# Patient Record
Sex: MALE | Race: BLACK OR AFRICAN AMERICAN | Employment: OTHER | URBAN - METROPOLITAN AREA
[De-identification: names, ages, dates, MRNs, and addresses within clinical notes are randomized per-mention and may not be internally consistent; named-entity substitution may affect disease eponyms.]

---

## 2018-02-27 ENCOUNTER — HOSPITAL ENCOUNTER (OUTPATIENT)
Dept: LAB | Age: 68
Discharge: HOME | End: 2018-02-27
Payer: MEDICARE

## 2018-02-27 ENCOUNTER — OFFICE VISIT (OUTPATIENT)
Dept: FAMILY MEDICINE CLINIC | Age: 68
End: 2018-02-27

## 2018-02-27 VITALS
SYSTOLIC BLOOD PRESSURE: 151 MMHG | TEMPERATURE: 97.3 F | HEART RATE: 74 BPM | DIASTOLIC BLOOD PRESSURE: 90 MMHG | HEIGHT: 72 IN | WEIGHT: 208 LBS | RESPIRATION RATE: 18 BRPM | BODY MASS INDEX: 28.17 KG/M2 | OXYGEN SATURATION: 98 %

## 2018-02-27 DIAGNOSIS — Z11.59 NEED FOR HEPATITIS C SCREENING TEST: ICD-10-CM

## 2018-02-27 DIAGNOSIS — K63.5 POLYP OF COLON, UNSPECIFIED PART OF COLON, UNSPECIFIED TYPE: ICD-10-CM

## 2018-02-27 DIAGNOSIS — R39.9 LOWER URINARY TRACT SYMPTOMS (LUTS): ICD-10-CM

## 2018-02-27 DIAGNOSIS — N40.1 BENIGN PROSTATIC HYPERPLASIA WITH INCOMPLETE BLADDER EMPTYING: ICD-10-CM

## 2018-02-27 DIAGNOSIS — R39.14 BENIGN PROSTATIC HYPERPLASIA WITH INCOMPLETE BLADDER EMPTYING: ICD-10-CM

## 2018-02-27 DIAGNOSIS — Z12.5 SCREENING FOR PROSTATE CANCER: ICD-10-CM

## 2018-02-27 DIAGNOSIS — Z12.11 SCREEN FOR COLON CANCER: ICD-10-CM

## 2018-02-27 DIAGNOSIS — N48.89 PENILE PAIN: ICD-10-CM

## 2018-02-27 DIAGNOSIS — I10 ESSENTIAL HYPERTENSION: ICD-10-CM

## 2018-02-27 DIAGNOSIS — I10 ESSENTIAL HYPERTENSION: Primary | ICD-10-CM

## 2018-02-27 LAB
ALBUMIN SERPL-MCNC: 4.5 G/DL (ref 3.4–5)
ALBUMIN/GLOB SERPL: 1.3 {RATIO} (ref 0.8–1.7)
ALP SERPL-CCNC: 85 U/L (ref 45–117)
ALT SERPL-CCNC: 23 U/L (ref 16–61)
ANION GAP SERPL CALC-SCNC: 9 MMOL/L (ref 3–18)
AST SERPL-CCNC: 20 U/L (ref 15–37)
BASOPHILS # BLD: 0.1 K/UL (ref 0–0.06)
BASOPHILS NFR BLD: 2 % (ref 0–2)
BILIRUB SERPL-MCNC: 0.4 MG/DL (ref 0.2–1)
BUN SERPL-MCNC: 19 MG/DL (ref 7–18)
BUN/CREAT SERPL: 18 (ref 12–20)
CALCIUM SERPL-MCNC: 9 MG/DL (ref 8.5–10.1)
CHLORIDE SERPL-SCNC: 103 MMOL/L (ref 100–108)
CHOLEST SERPL-MCNC: 220 MG/DL
CO2 SERPL-SCNC: 29 MMOL/L (ref 21–32)
CREAT SERPL-MCNC: 1.08 MG/DL (ref 0.6–1.3)
DIFFERENTIAL METHOD BLD: ABNORMAL
EOSINOPHIL # BLD: 0.1 K/UL (ref 0–0.4)
EOSINOPHIL NFR BLD: 1 % (ref 0–5)
ERYTHROCYTE [DISTWIDTH] IN BLOOD BY AUTOMATED COUNT: 12.4 % (ref 11.6–14.5)
GLOBULIN SER CALC-MCNC: 3.6 G/DL (ref 2–4)
GLUCOSE SERPL-MCNC: 105 MG/DL (ref 74–99)
HCT VFR BLD AUTO: 47.1 % (ref 36–48)
HDLC SERPL-MCNC: 72 MG/DL (ref 40–60)
HDLC SERPL: 3.1 {RATIO} (ref 0–5)
HGB BLD-MCNC: 15 G/DL (ref 13–16)
LDLC SERPL CALC-MCNC: 138.2 MG/DL (ref 0–100)
LIPID PROFILE,FLP: ABNORMAL
LYMPHOCYTES # BLD: 1.4 K/UL (ref 0.9–3.6)
LYMPHOCYTES NFR BLD: 32 % (ref 21–52)
MCH RBC QN AUTO: 27.6 PG (ref 24–34)
MCHC RBC AUTO-ENTMCNC: 31.8 G/DL (ref 31–37)
MCV RBC AUTO: 86.6 FL (ref 74–97)
MONOCYTES # BLD: 0.5 K/UL (ref 0.05–1.2)
MONOCYTES NFR BLD: 12 % (ref 3–10)
NEUTS SEG # BLD: 2.4 K/UL (ref 1.8–8)
NEUTS SEG NFR BLD: 53 % (ref 40–73)
PLATELET # BLD AUTO: 223 K/UL (ref 135–420)
PMV BLD AUTO: 10.8 FL (ref 9.2–11.8)
POTASSIUM SERPL-SCNC: 4.3 MMOL/L (ref 3.5–5.5)
PROT SERPL-MCNC: 8.1 G/DL (ref 6.4–8.2)
PSA SERPL-MCNC: 4 NG/ML (ref 0–4)
RBC # BLD AUTO: 5.44 M/UL (ref 4.7–5.5)
SODIUM SERPL-SCNC: 141 MMOL/L (ref 136–145)
TRIGL SERPL-MCNC: 49 MG/DL (ref ?–150)
VLDLC SERPL CALC-MCNC: 9.8 MG/DL
WBC # BLD AUTO: 4.4 K/UL (ref 4.6–13.2)

## 2018-02-27 PROCEDURE — 84153 ASSAY OF PSA TOTAL: CPT | Performed by: FAMILY MEDICINE

## 2018-02-27 PROCEDURE — 82274 ASSAY TEST FOR BLOOD FECAL: CPT | Performed by: FAMILY MEDICINE

## 2018-02-27 PROCEDURE — 80061 LIPID PANEL: CPT | Performed by: FAMILY MEDICINE

## 2018-02-27 PROCEDURE — 80053 COMPREHEN METABOLIC PANEL: CPT | Performed by: FAMILY MEDICINE

## 2018-02-27 PROCEDURE — 85025 COMPLETE CBC W/AUTO DIFF WBC: CPT | Performed by: FAMILY MEDICINE

## 2018-02-27 PROCEDURE — 86803 HEPATITIS C AB TEST: CPT | Performed by: FAMILY MEDICINE

## 2018-02-27 RX ORDER — DICLOFENAC POTASSIUM 50 MG/1
50 TABLET, FILM COATED ORAL 3 TIMES DAILY
COMMUNITY
End: 2018-04-24 | Stop reason: SDUPTHER

## 2018-02-27 NOTE — PATIENT INSTRUCTIONS
Medicare Part B Preventive Services Limitations Recommendation Scheduled   Bone Mass Measurement  (age 72 & older, biennial) Requires diagnosis related to osteoporosis or estrogen deficiency. Biennial benefit unless patient has history of long-term glucocorticoid tx or baseline is needed because initial test was by other method N/a     Cardiovascular Screening Blood Tests (every 5 years)  Total cholesterol, HDL, Triglycerides Order as a panel if possible Due     Colorectal Cancer Screening  -Fecal occult blood test (annual)  -Flexible sigmoidoscopy (5y)  -Screening colonoscopy (10y)  -Barium Enema  Due     Counseling to Prevent Tobacco Use (up to 8 sessions per year)  - Counseling greater than 3 and up to 10 minutes  - Counseling greater than 10 minutes Patients must be asymptomatic of tobacco-related conditions to receive as preventive service Former smoker     Diabetes Screening Tests (at least every 3 years, Medicare covers annually or at 6-month intervals for prediabetic patients)    Fasting blood sugar (FBS) or glucose tolerance test (GTT) Patient must be diagnosed with one of the following:  -Hypertension, Dyslipidemia, obesity, previous impaired FBS or GTT  Or any two of the following: overweight, FH of diabetes, age ? 72, history of gestational diabetes, birth of baby weighing more than 9 pounds Not diabetic     Diabetes Self-Management Training (DSMT) (no USPSTF recommendation) Requires referral by treating physician for patient with diabetes or renal disease. 10 hours of initial DSMT session of no less than 30 minutes each in a continuous 12-month period. 2 hours of follow-up DSMT in subsequent years.  N/a     Glaucoma Screening (no USPSTF recommendation) Diabetes mellitus, family history, , age 48 or over,  American, age 72 or over Due     Human Immunodeficiency Virus (HIV) Screening (annually for increased risk patients)  HIV-1 and HIV-2 by EIA, CASSI, rapid antibody test, or oral mucosa transudate Patient must be at increased risk for HIV infection per USPSTF guidelines or pregnant. Tests covered annually for patients at increased risk. Pregnant patients may receive up to 3 test during pregnancy. N/a     Medical Nutrition Therapy (MNT) (for diabetes or renal disease not recommended schedule) Requires referral by treating physician for patient with diabetes or renal disease. Can be provided in same year as diabetes self-management training (DSMT), and CMS recommends medical nutrition therapy take place after DSMT. Up to 3 hours for initial year and 2 hours in subsequent years. N/a     Prostate Cancer Screening (annually up to age 76)  - Digital rectal exam (DANIKA)  - Prostate specific antigen (PSA) Annually (age 48 or over), DANKIA not paid separately when covered E/M service is provided on same date  Men up to age 76 may need a screening blood test for prostate cancer at certain intervals, depending on their personal and family history. This decision is between the patient and his provider. Enlarged prostate in the past     Seasonal Influenza Vaccination (annually)  UTD      Pneumococcal Vaccination (once after 72)  UTD     Hepatitis B Vaccinations (if medium/high risk) Medium/high risk factors:  End-stage renal disease,  Hemophiliacs who received Factor VIII or IX concentrates, Clients of institutions for the mentally retarded, Persons who live in the same house as a HepB virus carrier, Homosexual men, Illicit injectable drug abusers. N/A     Shingles Vaccination A shingles vaccine is also recommended once in a lifetime after age 61 Patient not interested     Ultrasound Screening for Abdominal Aortic Aneurysm (AAA) (once) Patient must be referred through IPPE and not have had a screening for abdominal aortic aneurysm before under Medicare.   Limited to patients who meet one of the following criteria:  - Men who are 73-68 years old and have smoked more than 100 cigarettes in their lifetime.  -Anyone with a FH of AAA  -Anyone recommended for screening by USPSTF N/A            DASH Diet: Care Instructions  Your Care Instructions    The DASH diet is an eating plan that can help lower your blood pressure. DASH stands for Dietary Approaches to Stop Hypertension. Hypertension is high blood pressure. The DASH diet focuses on eating foods that are high in calcium, potassium, and magnesium. These nutrients can lower blood pressure. The foods that are highest in these nutrients are fruits, vegetables, low-fat dairy products, nuts, seeds, and legumes. But taking calcium, potassium, and magnesium supplements instead of eating foods that are high in those nutrients does not have the same effect. The DASH diet also includes whole grains, fish, and poultry. The DASH diet is one of several lifestyle changes your doctor may recommend to lower your high blood pressure. Your doctor may also want you to decrease the amount of sodium in your diet. Lowering sodium while following the DASH diet can lower blood pressure even further than just the DASH diet alone. Follow-up care is a key part of your treatment and safety. Be sure to make and go to all appointments, and call your doctor if you are having problems. It's also a good idea to know your test results and keep a list of the medicines you take. How can you care for yourself at home? Following the DASH diet  · Eat 4 to 5 servings of fruit each day. A serving is 1 medium-sized piece of fruit, ½ cup chopped or canned fruit, 1/4 cup dried fruit, or 4 ounces (½ cup) of fruit juice. Choose fruit more often than fruit juice. · Eat 4 to 5 servings of vegetables each day. A serving is 1 cup of lettuce or raw leafy vegetables, ½ cup of chopped or cooked vegetables, or 4 ounces (½ cup) of vegetable juice. Choose vegetables more often than vegetable juice. · Get 2 to 3 servings of low-fat and fat-free dairy each day.  A serving is 8 ounces of milk, 1 cup of yogurt, or 1 ½ ounces of cheese. · Eat 6 to 8 servings of grains each day. A serving is 1 slice of bread, 1 ounce of dry cereal, or ½ cup of cooked rice, pasta, or cooked cereal. Try to choose whole-grain products as much as possible. · Limit lean meat, poultry, and fish to 2 servings each day. A serving is 3 ounces, about the size of a deck of cards. · Eat 4 to 5 servings of nuts, seeds, and legumes (cooked dried beans, lentils, and split peas) each week. A serving is 1/3 cup of nuts, 2 tablespoons of seeds, or ½ cup of cooked beans or peas. · Limit fats and oils to 2 to 3 servings each day. A serving is 1 teaspoon of vegetable oil or 2 tablespoons of salad dressing. · Limit sweets and added sugars to 5 servings or less a week. A serving is 1 tablespoon jelly or jam, ½ cup sorbet, or 1 cup of lemonade. · Eat less than 2,300 milligrams (mg) of sodium a day. If you limit your sodium to 1,500 mg a day, you can lower your blood pressure even more. Tips for success  · Start small. Do not try to make dramatic changes to your diet all at once. You might feel that you are missing out on your favorite foods and then be more likely to not follow the plan. Make small changes, and stick with them. Once those changes become habit, add a few more changes. · Try some of the following:  ¨ Make it a goal to eat a fruit or vegetable at every meal and at snacks. This will make it easy to get the recommended amount of fruits and vegetables each day. ¨ Try yogurt topped with fruit and nuts for a snack or healthy dessert. ¨ Add lettuce, tomato, cucumber, and onion to sandwiches. ¨ Combine a ready-made pizza crust with low-fat mozzarella cheese and lots of vegetable toppings. Try using tomatoes, squash, spinach, broccoli, carrots, cauliflower, and onions. ¨ Have a variety of cut-up vegetables with a low-fat dip as an appetizer instead of chips and dip. ¨ Sprinkle sunflower seeds or chopped almonds over salads.  Or try adding chopped walnuts or almonds to cooked vegetables. ¨ Try some vegetarian meals using beans and peas. Add garbanzo or kidney beans to salads. Make burritos and tacos with mashed garcia beans or black beans. Where can you learn more? Go to http://vazquez-shraddha.info/. Enter Y431 in the search box to learn more about \"DASH Diet: Care Instructions. \"  Current as of: September 21, 2016  Content Version: 11.4  © 9797-1448 [employer]. Care instructions adapted under license by [employer] (which disclaims liability or warranty for this information). If you have questions about a medical condition or this instruction, always ask your healthcare professional. [address] any warranty or liability for your use of this information.

## 2018-02-27 NOTE — PROGRESS NOTES
Chief Complaint   Patient presents with   [facility] Establish Care    Penis Pain     warmth feeling x1 month      1. Have you been to the ER, urgent care clinic since your last visit? Hospitalized since your last visit? No    2. Have you seen or consulted any other health care providers outside of the [address] [place] [address] since your last visit? Include any pap smears or colon screening. No    History   Nicci Young comes in to establish care. Hypertension: Patient has history of hypertension. He is on benazepril and amlodipine. Blood pressure slightly elevated. He has not taken his benazepril today. I will check labs. I will follow-up in a month. He is to keep a blood pressure log. Prescription for blood pressure monitor given. Penile discomfort: Patient has burning sensation on the glans penis. This has happened twice in the last 6 weeks. Denies rash or any lesions. He he does have sex with her wife but very rarely. This is due to erectile dysfunction. Has not had sex recently. He is not circumcised. I did evaluate the penile shaft and it is normal.  No signs of any lesions. Likely had some yeast infection given the moist nature around his glans penis. No rash at the moment. He would like to see if this recurs and he will come in at that time. L UTS: Patient has lower urinary tract symptoms with incomplete bladder emptying and post micturition dribbling. He is on Flomax. This does help with the symptoms. He also does have an enlarged prostate. He would like to have his PSA checked. Health maintenance: Patient will have PSA checked, I will do hepatitis C screening and we will given FIT test for colon cancer screening.     Past Medical History:   Diagnosis Date    Arthritis     Hypertension     Other ill-defined conditions(607.34)     awais      Past Surgical History:   Procedure Laterality Date    HX APPENDECTOMY  1961    HX OTHER SURGICAL Left 1990's    Exc. mass foot     HX OTHER SURGICAL Right 1980    release Dequervain's contracture    HX OTHER SURGICAL Left 2014    Removal large nevi behind ear     Current Outpatient Prescriptions   Medication Sig Dispense Refill    diclofenac potassium (CATAFLAM) 50 mg tablet Take 50 mg by mouth three (3) times daily.  amLODIPine (NORVASC) 10 mg tablet Take 10 mg by mouth daily.  benazepril (LOTENSIN) 20 mg tablet Take 20 mg by mouth daily.  tamsulosin (FLOMAX) 0.4 mg capsule Take 0.4 mg by mouth daily.  travoprost (TRAVATAN Z) 0.004 % ophthalmic solution Administer 1 Drop to both eyes nightly.  naproxen sodium (ALEVE) 220 mg cap Take 440 mg by mouth daily.  acetaminophen (TYLENOL EXTRA STRENGTH) 500 mg tablet Take 1,000 mg by mouth every six (6) hours as needed for Pain.  omega-3 fatty acids-vitamin e (FISH OIL) 1,000 mg cap Take 1 Cap by mouth daily. No Known Allergies  No family history on file. Social History   Substance Use Topics    Smoking status: Former Smoker     Quit date: 1/1/1980    Smokeless tobacco: Never Used    Alcohol use No     Patient Active Problem List   Diagnosis Code    History of colon polyps Z86.010    Family history of colon cancer Z80.0       Depression Risk Factor Screening:     PHQ over the last two weeks 2/27/2018   Little interest or pleasure in doing things Not at all   Feeling down, depressed or hopeless Not at all   Total Score PHQ 2 0     Alcohol Risk Factor Screening: You do not drink alcohol or very rarely. Functional Ability and Level of Safety:   1. Was the patient's timed Up and GO test unsteady or longer than 30 seconds? No  2. Does the patient need help with the phone, transportation, shopping, preparing meals, housework, laundry, medications or managing money? No  3. Does the patients' home have rugs in the hallway, lack grab bars in the bathroom, lack handrails on the stairs or have poor lighting? No  4. Have you noticed any hearing difficulties?  No  Hearing Evaluation:  Hearing Loss  Hearing is good. Activities of Daily Living  The home contains: no safety equipment. Patient does total self care    Fall Risk  Fall Risk Assessment, last 12 mths 2/27/2018   Able to walk? Yes   Fall in past 12 months? No       Abuse Screen  Patient is not abused    Cognitive Screening   Evaluation of Cognitive Function:  Has your family/caregiver stated any concerns about your memory: yes  Normal    Review of Systems   A comprehensive review of systems was negative except for that written in the HPI. Physical Examination     Visit Vitals    /90 (BP 1 Location: Left arm, BP Patient Position: Sitting)    Pulse 74    Temp 97.3 °F (36.3 °C) (Oral)    Resp 18    Ht 6' (1.829 m)    Wt 208 lb (94.3 kg)    SpO2 98%    BMI 28.21 kg/m2     General appearance: alert, cooperative, no distress, appears stated age  Head: Normocephalic, without obvious abnormality, atraumatic  Eyes: conjunctivae/corneas clear. PERRL, EOM's intact. Neck: supple, symmetrical, trachea midline, no adenopathy, thyroid: not enlarged, symmetric, no tenderness/mass/nodules, no carotid bruit and no JVD  Lungs: clear to auscultation bilaterally  Heart: regular rate and rhythm, S1, S2 normal, systolic murmur: systolic ejection 2/6,  at lower left sternal border  Male genitalia: penis: no lesions or discharge. Extremities: extremities normal, atraumatic, no cyanosis or edema  Pulses: 2+ and symmetric  Neurologic: Grossly normal    Patient Care Team   Patient Care Team:  Anaya Mart MD as PCP - General (Family Practice)    Assessment/Plan   Education and counseling provided:  Are appropriate based on today's review and evaluation      ICD-10-CM ICD-9-CM    1. Essential hypertension I10 401.9 CBC WITH AUTOMATED DIFF      LIPID PANEL      METABOLIC PANEL, COMPREHENSIVE   2. Lower urinary tract symptoms (LUTS) R39.9 788.99 PSA SCREENING (SCREENING)   3.  Benign prostatic hyperplasia with incomplete bladder emptying N40.1 600.01 PSA SCREENING (SCREENING)    R39.14 788.21    4. Need for hepatitis C screening test Z11.59 V73.89 HEPATITIS C AB   5. Screening for prostate cancer Z12.5 V76.44 PSA SCREENING (SCREENING)   6. Polyp of colon, unspecified part of colon, unspecified type K63.5 211.3    7. Penile pain N48.89 607.9    8. Screen for colon cancer Z12.11 V76.51 OCCULT BLOOD, IMMUNOASSAY (FIT)     Health Maintenance Due   Topic Date Due    Hepatitis C Screening  1950    DTaP/Tdap/Td series (1 - Tdap) 01/15/1970    FOBT Q 1 YEAR AGE 50-75  01/15/1999    ZOSTER VACCINE AGE 60>  11/15/2008    GLAUCOMA SCREENING Q2Y  01/15/2014    Pneumococcal 65+ Low/Medium Risk (1 of 2 - PCV13) 01/15/2014    MEDICARE YEARLY EXAM  01/15/2014    Influenza Age 9 to Adult  08/01/2017   I have discussed the diagnosis with the patient and the intended plan of care as seen in the above orders. The patient has received an after-visit summary and questions were answered concerning future plans. I have discussed medication, side effects, and warnings with the patient in detail. The patient verbalized understanding and is in agreement with the plan of care. The patient will contact the office with any additional concerns.     Brandee Lopez MD

## 2018-02-27 NOTE — MR AVS SNAPSHOT
[facility] [address] [facility] 
[phone] Patient: Higinio Castillor MRN: [id number] OWJ:[id number] Visit Information Date & Time Provider Department Dept. Phone Encounter #  
 2/27/2018  8:30 AM Alma Loya MD [facility] [phone] [id number] Follow-up Instructions Return in about 1 month (around 3/27/2018), or if symptoms worsen or fail to improve, for htn, luts. Upcoming Health Maintenance Date Due Hepatitis C Screening 1950 DTaP/Tdap/Td series (1 - Tdap) 1/15/1970 ZOSTER VACCINE AGE 60> 11/15/2008 GLAUCOMA SCREENING Q2Y 1/15/2014 Pneumococcal 65+ Low/Medium Risk (1 of 2 - PCV13) 1/15/2014 MEDICARE YEARLY EXAM 1/15/2014 COLONOSCOPY 4/18/2019 Allergies as of 2/27/2018  Review Complete On: 2/27/2018 By: Nicola Bhat MD  
 No Known Allergies Current Immunizations  Never Reviewed No immunizations on file. Not reviewed this visit You Were Diagnosed With   
  
 Codes Comments Essential hypertension    -  Primary ICD-10-CM: I10 
ICD-9-CM: 401.9 Lower urinary tract symptoms (LUTS)     ICD-10-CM: R39.9 ICD-9-CM: 788.99 Benign prostatic hyperplasia with incomplete bladder emptying     ICD-10-CM: N40.1, R39.14 ICD-9-CM: 600.01, 788.21 Need for hepatitis C screening test     ICD-10-CM: Z11.59 
ICD-9-CM: V73.89 Screening for prostate cancer     ICD-10-CM: Z12.5 ICD-9-CM: V76.44 Polyp of colon, unspecified part of colon, unspecified type     ICD-10-CM: K63.5 ICD-9-CM: 211.3 Penile pain     ICD-10-CM: N48.89 ICD-9-CM: 607.9 Vitals BP Pulse Temp Resp Height(growth percentile) Weight(growth percentile) 151/90 (BP 1 Location: Left arm, BP Patient Position: Sitting) 74 97.3 °F (36.3 °C) (Oral) 18 6' (1.829 m) 208 lb (94.3 kg) SpO2 BMI Smoking Status 98% 28.21 kg/m2 Former Smoker Vitals History BMI and BSA Data Body Mass Index Body Surface Area  
 28.21 kg/m 2 2.19 m 2 Your Updated Medication List  
  
   
This list is accurate as of 2/27/18  9:26 AM.  Always use your most recent med list.  
  
  
  
  
 ALEVE 220 mg Cap Generic drug:  naproxen sodium Take 440 mg by mouth daily. amLODIPine 10 mg tablet Commonly known as:  Amairani Prow Take 10 mg by mouth daily. benazepril 20 mg tablet Commonly known as:  LOTENSIN Take 20 mg by mouth daily. diclofenac potassium 50 mg tablet Commonly known as:  CATAFLAM  
Take 50 mg by mouth three (3) times daily. FISH OIL 1,000 mg Cap Generic drug:  omega-3 fatty acids-vitamin e Take 1 Cap by mouth daily. FLOMAX 0.4 mg capsule Generic drug:  tamsulosin Take 0.4 mg by mouth daily. TRAVATAN Z 0.004 % ophthalmic solution Generic drug:  travoprost  
Administer 1 Drop to both eyes nightly. TYLENOL EXTRA STRENGTH 500 mg tablet Generic drug:  acetaminophen Take 1,000 mg by mouth every six (6) hours as needed for Pain. Follow-up Instructions Return in about 1 month (around 3/27/2018), or if symptoms worsen or fail to improve, for htn, luts. To-Do List   
 02/27/2018 Lab:  CBC WITH AUTOMATED DIFF   
  
 02/27/2018 Lab:  HEPATITIS C AB   
  
 02/27/2018 Lab:  LIPID PANEL   
  
 02/27/2018 Lab:  METABOLIC PANEL, COMPREHENSIVE   
  
 02/27/2018 Lab:  PSA SCREENING (SCREENING) Patient Instructions Medicare Part B Preventive Services Limitations Recommendation Scheduled Bone Mass Measurement 
(age 72 & older, biennial) Requires diagnosis related to osteoporosis or estrogen deficiency. Biennial benefit unless patient has history of long-term glucocorticoid tx or baseline is needed because initial test was by other method N/a Cardiovascular Screening Blood Tests (every 5 years) Total cholesterol, HDL, Triglycerides Order as a panel if possible Due Colorectal Cancer Screening 
-Fecal occult blood test (annual) -Flexible sigmoidoscopy (5y) 
-Screening colonoscopy (10y) -Barium Enema  Due    
Counseling to Prevent Tobacco Use (up to 8 sessions per year) - Counseling greater than 3 and up to 10 minutes - Counseling greater than 10 minutes Patients must be asymptomatic of tobacco-related conditions to receive as preventive service Former smoker Diabetes Screening Tests (at least every 3 years, Medicare covers annually or at 6-month intervals for prediabetic patients) Fasting blood sugar (FBS) or glucose tolerance test (GTT) Patient must be diagnosed with one of the following: 
-Hypertension, Dyslipidemia, obesity, previous impaired FBS or GTT 
Or any two of the following: overweight, FH of diabetes, age ? 72, history of gestational diabetes, birth of baby weighing more than 9 pounds Not diabetic Diabetes Self-Management Training (DSMT) (no USPSTF recommendation) Requires referral by treating physician for patient with diabetes or renal disease. 10 hours of initial DSMT session of no less than 30 minutes each in a continuous 12-month period. 2 hours of follow-up DSMT in subsequent years. N/a Glaucoma Screening (no USPSTF recommendation) Diabetes mellitus, family history, , age 48 or over,  American, age 72 or over Due Human Immunodeficiency Virus (HIV) Screening (annually for increased risk patients) HIV-1 and HIV-2 by EIA, CASSI, rapid antibody test, or oral mucosa transudate Patient must be at increased risk for HIV infection per USPSTF guidelines or pregnant. Tests covered annually for patients at increased risk. Pregnant patients may receive up to 3 test during pregnancy. N/a Medical Nutrition Therapy (MNT) (for diabetes or renal disease not recommended schedule) Requires referral by treating physician for patient with diabetes or renal disease. Can be provided in same year as diabetes self-management training (DSMT), and CMS recommends medical nutrition therapy take place after DSMT. Up to 3 hours for initial year and 2 hours in subsequent years. N/a Prostate Cancer Screening (annually up to age 76) - Digital rectal exam (DANIKA) - Prostate specific antigen (PSA) Annually (age 48 or over), DANIKA not paid separately when covered E/M service is provided on same date Men up to age 76 may need a screening blood test for prostate cancer at certain intervals, depending on their personal and family history. This decision is between the patient and his provider. Enlarged prostate in the past    
Seasonal Influenza Vaccination (annually)  UTD Pneumococcal Vaccination (once after 65)  UTD Hepatitis B Vaccinations (if medium/high risk) Medium/high risk factors:  End-stage renal disease, Hemophiliacs who received Factor VIII or IX concentrates, Clients of institutions for the mentally retarded, Persons who live in the same house as a HepB virus carrier, Homosexual men, Illicit injectable drug abusers. N/A Shingles Vaccination A shingles vaccine is also recommended once in a lifetime after age 61 Patient not interested Ultrasound Screening for Abdominal Aortic Aneurysm (AAA) (once) Patient must be referred through IPPE and not have had a screening for abdominal aortic aneurysm before under Medicare. Limited to patients who meet one of the following criteria: 
- Men who are 73-68 years old and have smoked more than 100 cigarettes in their lifetime. 
-Anyone with a FH of AAA 
-Anyone recommended for screening by USPSTF N/A    
 
 
  
DASH Diet: Care Instructions Your Care Instructions The DASH diet is an eating plan that can help lower your blood pressure. DASH stands for Dietary Approaches to Stop Hypertension. Hypertension is high blood pressure. The DASH diet focuses on eating foods that are high in calcium, potassium, and magnesium. These nutrients can lower blood pressure. The foods that are highest in these nutrients are fruits, vegetables, low-fat dairy products, nuts, seeds, and legumes. But taking calcium, potassium, and magnesium supplements instead of eating foods that are high in those nutrients does not have the same effect. The DASH diet also includes whole grains, fish, and poultry. The DASH diet is one of several lifestyle changes your doctor may recommend to lower your high blood pressure. Your doctor may also want you to decrease the amount of sodium in your diet. Lowering sodium while following the DASH diet can lower blood pressure even further than just the DASH diet alone. Follow-up care is a key part of your treatment and safety. Be sure to make and go to all appointments, and call your doctor if you are having problems. It's also a good idea to know your test results and keep a list of the medicines you take. How can you care for yourself at home? Following the DASH diet · Eat 4 to 5 servings of fruit each day. A serving is 1 medium-sized piece of fruit, ½ cup chopped or canned fruit, 1/4 cup dried fruit, or 4 ounces (½ cup) of fruit juice. Choose fruit more often than fruit juice. · Eat 4 to 5 servings of vegetables each day. A serving is 1 cup of lettuce or raw leafy vegetables, ½ cup of chopped or cooked vegetables, or 4 ounces (½ cup) of vegetable juice. Choose vegetables more often than vegetable juice. · Get 2 to 3 servings of low-fat and fat-free dairy each day. A serving is 8 ounces of milk, 1 cup of yogurt, or 1 ½ ounces of cheese. · Eat 6 to 8 servings of grains each day. A serving is 1 slice of bread, 1 ounce of dry cereal, or ½ cup of cooked rice, pasta, or cooked cereal. Try to choose whole-grain products as much as possible. · Limit lean meat, poultry, and fish to 2 servings each day.  A serving is 3 ounces, about the size of a deck of cards. · Eat 4 to 5 servings of nuts, seeds, and legumes (cooked dried beans, lentils, and split peas) each week. A serving is 1/3 cup of nuts, 2 tablespoons of seeds, or ½ cup of cooked beans or peas. · Limit fats and oils to 2 to 3 servings each day. A serving is 1 teaspoon of vegetable oil or 2 tablespoons of salad dressing. · Limit sweets and added sugars to 5 servings or less a week. A serving is 1 tablespoon jelly or jam, ½ cup sorbet, or 1 cup of lemonade. · Eat less than 2,300 milligrams (mg) of sodium a day. If you limit your sodium to 1,500 mg a day, you can lower your blood pressure even more. Tips for success · Start small. Do not try to make dramatic changes to your diet all at once. You might feel that you are missing out on your favorite foods and then be more likely to not follow the plan. Make small changes, and stick with them. Once those changes become habit, add a few more changes. · Try some of the following: ¨ Make it a goal to eat a fruit or vegetable at every meal and at snacks. This will make it easy to get the recommended amount of fruits and vegetables each day. ¨ Try yogurt topped with fruit and nuts for a snack or healthy dessert. ¨ Add lettuce, tomato, cucumber, and onion to sandwiches. ¨ Combine a ready-made pizza crust with low-fat mozzarella cheese and lots of vegetable toppings. Try using tomatoes, squash, spinach, broccoli, carrots, cauliflower, and onions. ¨ Have a variety of cut-up vegetables with a low-fat dip as an appetizer instead of chips and dip. ¨ Sprinkle sunflower seeds or chopped almonds over salads. Or try adding chopped walnuts or almonds to cooked vegetables. ¨ Try some vegetarian meals using beans and peas. Add garbanzo or kidney beans to salads. Make burritos and tacos with mashed garcia beans or black beans. Where can you learn more? Go to http://shukla-shraddha.info/. Enter A152 in the search box to learn more about \"DASH Diet: Care Instructions. \" Current as of: September 21, 2016 Content Version: 11.4 © 4099-5107 Healthwise, [employer]. Care instructions adapted under license by [employer] (which disclaims liability or warranty for this information). If you have questions about a medical condition or this instruction, always ask your healthcare professional. [address] any warranty or liability for your use of this information. Introducing [facility]! Kaela Iyer introduces [employer] patient portal. Now you can access parts of your medical record, email your doctor's office, and request medication refills online. 1. In your internet browser, go to [email or web address] 2. Click on the First Time User? Click Here link in the Sign In box. You will see the New Member Sign Up page. 3. Enter your [employer] Access Code exactly as it appears below. You will not need to use this code after youve completed the sign-up process. If you do not sign up before the expiration date, you must request a new code. · [employer] Access Code: [email or web address] Expires: 5/28/2018  9:24 AM 
 
4. Enter the last four digits of your Social Security Number (xxxx) and Date of Birth (mm/dd/yyyy) as indicated and click Submit. You will be taken to the next sign-up page. 5. Create a [employer] ID. This will be your [employer] login ID and cannot be changed, so think of one that is secure and easy to remember. 6. Create a [employer] password. You can change your password at any time. 7. Enter your Password Reset Question and Answer. This can be used at a later time if you forget your password. 8. Enter your e-mail address. You will receive e-mail notification when new information is available in [address] 9. Click Sign Up. You can now view and download portions of your medical record. 10. Click the Download Summary menu link to download a portable copy of your medical information. If you have questions, please visit the Frequently Asked Questions section of the [employer] website. Remember, [employer] is NOT to be used for urgent needs. For medical emergencies, dial 911. Now available from your iPhone and Android! Please provide this summary of care documentation to your next provider. Your primary care clinician is listed as Alma Cramer. If you have any questions after today's visit, please call [phone].

## 2018-02-28 LAB
HCV AB SER IA-ACNC: 0.08 INDEX
HCV AB SERPL QL IA: NEGATIVE
HCV COMMENT,HCGAC: NORMAL

## 2018-03-02 RX ORDER — ATORVASTATIN CALCIUM 40 MG/1
40 TABLET, FILM COATED ORAL DAILY
Qty: 30 TAB | Refills: 2 | Status: SHIPPED | OUTPATIENT
Start: 2018-03-02 | End: 2018-03-05 | Stop reason: SDUPTHER

## 2018-03-02 NOTE — PROGRESS NOTES
If patient is willing to take cholesterol-lowering medication he should let us know and we will send this to his mail order pharmacy.   [place], MD

## 2018-03-02 NOTE — PROGRESS NOTES
Please let patient know his cholesterol level is elevated. I will recommend that he take medication to lower this. I will send in Lipitor to take daily and we will recheck lipid panel in 3 months. We still await his stool for occult blood test.  Rest of his labs are stable.   Ting Guerrero MD

## 2018-03-04 LAB — HEMOCCULT STL QL IA: NEGATIVE

## 2018-03-05 RX ORDER — ATORVASTATIN CALCIUM 40 MG/1
40 TABLET, FILM COATED ORAL DAILY
Qty: 90 TAB | Refills: 1 | Status: SHIPPED | OUTPATIENT
Start: 2018-03-05 | End: 2018-04-24 | Stop reason: SDUPTHER

## 2018-03-05 NOTE — PROGRESS NOTES
Informed patient of lab results at this time. Patient verbalized understanding at this time. Patient states he will take medication and can it be sent in to his mail order at this time.

## 2018-03-27 ENCOUNTER — OFFICE VISIT (OUTPATIENT)
Dept: FAMILY MEDICINE CLINIC | Age: 68
End: 2018-03-27

## 2018-03-27 VITALS
HEIGHT: 72 IN | OXYGEN SATURATION: 99 % | DIASTOLIC BLOOD PRESSURE: 80 MMHG | TEMPERATURE: 97.2 F | WEIGHT: 207 LBS | BODY MASS INDEX: 28.04 KG/M2 | RESPIRATION RATE: 18 BRPM | HEART RATE: 70 BPM | SYSTOLIC BLOOD PRESSURE: 153 MMHG

## 2018-03-27 DIAGNOSIS — E66.3 OVERWEIGHT (BMI 25.0-29.9): ICD-10-CM

## 2018-03-27 DIAGNOSIS — B35.1 ONYCHOMYCOSIS: ICD-10-CM

## 2018-03-27 DIAGNOSIS — E78.5 DYSLIPIDEMIA: ICD-10-CM

## 2018-03-27 DIAGNOSIS — I10 ESSENTIAL HYPERTENSION: Primary | ICD-10-CM

## 2018-03-27 RX ORDER — BENAZEPRIL HYDROCHLORIDE 40 MG/1
40 TABLET ORAL DAILY
Qty: 90 TAB | Refills: 1 | Status: SHIPPED | OUTPATIENT
Start: 2018-03-27 | End: 2018-04-24 | Stop reason: SDUPTHER

## 2018-03-27 RX ORDER — TERBINAFINE HYDROCHLORIDE 250 MG/1
250 TABLET ORAL DAILY
Qty: 90 TAB | Refills: 0 | Status: SHIPPED | OUTPATIENT
Start: 2018-03-27 | End: 2018-04-24

## 2018-03-27 NOTE — MR AVS SNAPSHOT
[facility] [address] [facility] 
[phone] Patient: Ainsley Borrero MRN: [id number] OFM:[id number][birth year]00[id number] Visit Information Date & Time Provider Department Dept. Phone Encounter #  
 3/27/2018  8:30 AM Antoineed Colten Anne MD [facility] [phone] [id number] Follow-up Instructions Return in about 1 month (around 4/27/2018), or if symptoms worsen or fail to improve, for htn, onychomycosis. Upcoming Health Maintenance Date Due DTaP/Tdap/Td series (1 - Tdap) 1/15/1970 ZOSTER VACCINE AGE 60> 11/15/2008 GLAUCOMA SCREENING Q2Y 1/15/2014 Pneumococcal 65+ Low/Medium Risk (1 of 2 - PCV13) 1/15/2014 MEDICARE YEARLY EXAM 3/14/2018 COLONOSCOPY 4/18/2019 Allergies as of 3/27/2018  Review Complete On: 3/27/2018 By: Jennifer Garcia MD  
 No Known Allergies Current Immunizations  Never Reviewed No immunizations on file. Not reviewed this visit You Were Diagnosed With   
  
 Codes Comments Essential hypertension    -  Primary ICD-10-CM: I10 
ICD-9-CM: 401.9 Dyslipidemia     ICD-10-CM: E78.5 ICD-9-CM: 272.4 Onychomycosis     ICD-10-CM: B35.1 ICD-9-CM: 110.1 Vitals BP Pulse Temp Resp Height(growth percentile) Weight(growth percentile) 153/80 (BP 1 Location: Left arm, BP Patient Position: Sitting) 70 97.2 °F (36.2 °C) (Oral) 18 6' (1.829 m) 207 lb (93.9 kg) SpO2 BMI Smoking Status 99% 28.07 kg/m2 Former Smoker Vitals History BMI and BSA Data Body Mass Index Body Surface Area 28.07 kg/m 2 2.18 m 2 Preferred Pharmacy Pharmacy Name Phone 305 [facility], [address] [address] [place] [address] [address] Jessie Rivera [address] Your Updated Medication List  
  
   
This list is accurate as of 3/27/18  9:14 AM.  Always use your most recent med list. amLODIPine 10 mg tablet Commonly known as:  Trudy Orellana Take 10 mg by mouth daily. atorvastatin 40 mg tablet Commonly known as:  LIPITOR Take 1 Tab by mouth daily. benazepril 40 mg tablet Commonly known as:  LOTENSIN Take 1 Tab by mouth daily. diclofenac potassium 50 mg tablet Commonly known as:  CATAFLAM  
Take 50 mg by mouth three (3) times daily. FISH OIL 1,000 mg Cap Generic drug:  omega-3 fatty acids-vitamin e Take 1 Cap by mouth daily. FLOMAX 0.4 mg capsule Generic drug:  tamsulosin Take 0.4 mg by mouth daily. terbinafine HCl 250 mg tablet Commonly known as:  LAMISIL Take 1 Tab by mouth daily. Indications: TOENAIL ONYCHOMYCOSIS  
  
 TRAVATAN Z 0.004 % ophthalmic solution Generic drug:  travoprost  
Administer 1 Drop to both eyes nightly. TYLENOL EXTRA STRENGTH 500 mg tablet Generic drug:  acetaminophen Take 1,000 mg by mouth every six (6) hours as needed for Pain. Prescriptions Sent to Pharmacy Refills  
 benazepril (LOTENSIN) 40 mg tablet 1 Sig: Take 1 Tab by mouth daily. Class: Normal  
 Pharmacy: [address] [place] [address] Ave, [address] Ph #: [phone] Route: Oral  
 terbinafine HCl (LAMISIL) 250 mg tablet 0 Sig: Take 1 Tab by mouth daily. Indications: TOENAIL ONYCHOMYCOSIS Class: Normal  
 Pharmacy: [facility] [address] Ph #: [phone] Route: Oral  
  
Follow-up Instructions Return in about 1 month (around 4/27/2018), or if symptoms worsen or fail to improve, for htn, onychomycosis. Patient Instructions Body Mass Index: Care Instructions Your Care Instructions Body mass index (BMI) can help you see if your weight is raising your risk for health problems. It uses a formula to compare how much you weigh with how tall you are. · A BMI lower than 18.5 is considered underweight. · A BMI between 18.5 and 24.9 is considered healthy. · A BMI between 25 and 29.9 is considered overweight. A BMI of 30 or higher is considered obese. If your BMI is in the normal range, it means that you have a lower risk for weight-related health problems. If your BMI is in the overweight or obese range, you may be at increased risk for weight-related health problems, such as high blood pressure, heart disease, stroke, arthritis or joint pain, and diabetes. If your BMI is in the underweight range, you may be at increased risk for health problems such as fatigue, lower protection (immunity) against illness, muscle loss, bone loss, hair loss, and hormone problems. BMI is just one measure of your risk for weight-related health problems. You may be at higher risk for health problems if you are not active, you eat an unhealthy diet, or you drink too much alcohol or use tobacco products. Follow-up care is a key part of your treatment and safety. Be sure to make and go to all appointments, and call your doctor if you are having problems. It's also a good idea to know your test results and keep a list of the medicines you take. How can you care for yourself at home? · Practice healthy eating habits. This includes eating plenty of fruits, vegetables, whole grains, lean protein, and low-fat dairy. · If your doctor recommends it, get more exercise. Walking is a good choice. Bit by bit, increase the amount you walk every day. Try for at least 30 minutes on most days of the week. · Do not smoke. Smoking can increase your risk for health problems. If you need help quitting, talk to your doctor about stop-smoking programs and medicines. These can increase your chances of quitting for good. · Limit alcohol to 2 drinks a day for men and 1 drink a day for women. Too much alcohol can cause health problems. If you have a BMI higher than 25 · Your doctor may do other tests to check your risk for weight-related health problems.  This may include measuring the distance around your waist. A waist measurement of more than 40 inches in men or 35 inches in women can increase the risk of weight-related health problems. · Talk with your doctor about steps you can take to stay healthy or improve your health. You may need to make lifestyle changes to lose weight and stay healthy, such as changing your diet and getting regular exercise. If you have a BMI lower than 18.5 · Your doctor may do other tests to check your risk for health problems. · Talk with your doctor about steps you can take to stay healthy or improve your health. You may need to make lifestyle changes to gain or maintain weight and stay healthy, such as getting more healthy foods in your diet and doing exercises to build muscle. Where can you learn more? Go to http://vazquez-shraddha.info/. Enter S176 in the search box to learn more about \"Body Mass Index: Care Instructions. \" Current as of: October 13, 2016 Content Version: 11.4 © 5703-7809 [employer]. Care instructions adapted under license by [employer] (which disclaims liability or warranty for this information). If you have questions about a medical condition or this instruction, always ask your healthcare professional. [place] [address] any warranty or liability for your use of this information. Toenail Fungus: Care Instructions Your Care Instructions A toenail that is infected by a fungus usually turns white or yellow. As the fungus spreads, the nail turns a darker color and gets thicker, and its edges start to turn ragged and crumble. A bad infection can cause toe pain, and the nail may pull away from the toe. Toenails that are exposed to moisture and warmth a lot are more likely to get infected by a fungus. This can happen from wearing sweaty shoes often and from walking barefoot on shower floors.  
It is hard to treat toenail fungus, and the infection can return after it has cleared up. But medicines can sometimes get rid of toenail fungus for good. If the infection is very bad, or if it causes a lot of pain, you may need to have the nail removed. Follow-up care is a key part of your treatment and safety. Be sure to make and go to all appointments, and call your doctor if you are having problems. It's also a good idea to know your test results and keep a list of the medicines you take. How can you care for yourself at home? · Take your medicines exactly as prescribed. Call your doctor if you have any problems with your medicine. You will get more details on the specific medicines your doctor prescribes. · If your doctor gave you a cream or liquid to put on your toenail, use it exactly as directed. · Wash your feet often, and wash your hands after touching your feet. · Keep your toenails clean and dry. Dry your feet completely after you bathe and before you put on shoes and socks. · Keep your toenails trimmed. · Change socks often. Wear dry socks that absorb moisture. · Do not go barefoot in public places. · Use a spray or powder that fights fungus on your feet and in your shoes. · Do not pick at the skin around your nails. · Do not use nail polish or fake nails on your toenails. When should you call for help? Call your doctor now or seek immediate medical care if: 
? · You have signs of infection, such as: 
¨ Increased pain, swelling, warmth, or redness. ¨ Red streaks leading from the site. ¨ Pus draining from the site. ¨ A fever. ? · You have new or increased toe pain. ? Watch closely for changes in your health, and be sure to contact your doctor if: 
? · You do not get better as expected. Where can you learn more? Go to http://vazquez-shraddha.info/. Enter D202 in the search box to learn more about \"Toenail Fungus: Care Instructions. \" Current as of: October 13, 2016 Content Version: 11.4 © 1699-1705 [employer]. Care instructions adapted under license by [employer] (which disclaims liability or warranty for this information). If you have questions about a medical condition or this instruction, always ask your healthcare professional. [address] any warranty or liability for your use of this information. Introducing [facility]! [employer] introduces Rome2rio patient portal. Now you can access parts of your medical record, email your doctor's office, and request medication refills online. 1. In your internet browser, go to [email or web address] 2. Click on the First Time User? Click Here link in the Sign In box. You will see the New Member Sign Up page. 3. Enter your Rome2rio Access Code exactly as it appears below. You will not need to use this code after youve completed the sign-up process. If you do not sign up before the expiration date, you must request a new code. · Rome2rio Access Code: [email or web address] Expires: 5/28/2018 10:24 AM 
 
4. Enter the last four digits of your Social Security Number (xxxx) and Date of Birth (mm/dd/yyyy) as indicated and click Submit. You will be taken to the next sign-up page. 5. Create a Rome2rio ID. This will be your Rome2rio login ID and cannot be changed, so think of one that is secure and easy to remember. 6. Create a Rome2rio password. You can change your password at any time. 7. Enter your Password Reset Question and Answer. This can be used at a later time if you forget your password. 8. Enter your e-mail address. You will receive e-mail notification when new information is available in [address] 9. Click Sign Up. You can now view and download portions of your medical record. 10. Click the Download Summary menu link to download a portable copy of your medical information.  
 
If you have questions, please visit the Frequently Asked Questions section of the [facility]. Remember, PagerDutyhart is NOT to be used for urgent needs. For medical emergencies, dial 911. Now available from your iPhone and Android! Please provide this summary of care documentation to your next provider. Your primary care clinician is listed as Alma Cramer. If you have any questions after today's visit, please call [phone].

## 2018-03-27 NOTE — PROGRESS NOTES
Chief Complaint   Patient presents with    Follow-up     HTN,LUTS      1. Have you been to the ER, urgent care clinic since your last visit? Hospitalized since your last visit? No    2. Have you seen or consulted any other health care providers outside of the [facility] since your last visit? Include any pap smears or colon screening. No     HPI  Ainsley Borrero comes in for follow-up care. Hypertension: Patient has hypertension. Pressure remains elevated. We discussed medication adjustments and management for his blood pressure. For now we will increase the benazepril to 40 mg daily. He will continue with amlodipine 10 mg daily. Follow-up in a month. Toenail onychomycosis: Patient has bilateral toenail onychomycosis. Would like to try medication for this. I will send in Lamisil to take to 50 mg once a day. Follow-up in 1 month to see how he is doing. We will check hepatic panel then. Arthritis: Patient has generalized arthralgia and takes diclofenac for this. Occasionally has taken Aleve and this also did help. We discussed medication management. He should avoid taking both NSAIDs of the same time. I will have him use the diclofenac for now. Past Medical History  Past Medical History:   Diagnosis Date    Arthritis     Hypertension     Other ill-defined conditions(294.53)     murmer       Surgical History  Past Surgical History:   Procedure Laterality Date    HX APPENDECTOMY  1961    HX OTHER SURGICAL Left 1990's    Exc. mass foot     HX OTHER SURGICAL Right 1980    release Dequervain's contracture    HX OTHER SURGICAL Left 2014    Removal large nevi behind ear        Medications  Current Outpatient Prescriptions   Medication Sig Dispense Refill    atorvastatin (LIPITOR) 40 mg tablet Take 1 Tab by mouth daily. 90 Tab 1    diclofenac potassium (CATAFLAM) 50 mg tablet Take 50 mg by mouth three (3) times daily.       amLODIPine (NORVASC) 10 mg tablet Take 10 mg by mouth daily.      benazepril (LOTENSIN) 20 mg tablet Take 20 mg by mouth daily.  tamsulosin (FLOMAX) 0.4 mg capsule Take 0.4 mg by mouth daily.  travoprost (TRAVATAN Z) 0.004 % ophthalmic solution Administer 1 Drop to both eyes nightly.  omega-3 fatty acids-vitamin e (FISH OIL) 1,000 mg cap Take 1 Cap by mouth daily.  naproxen sodium (ALEVE) 220 mg cap Take 440 mg by mouth daily.  acetaminophen (TYLENOL EXTRA STRENGTH) 500 mg tablet Take 1,000 mg by mouth every six (6) hours as needed for Pain. Allergies  No Known Allergies    Family History  No family history on file. Social History  Social History     Social History    Marital status:      Spouse name: N/A    Number of children: N/A    Years of education: N/A     Occupational History    Not on file.      Social History Main Topics    Smoking status: Former Smoker     Quit date: 1/1/1980    Smokeless tobacco: Never Used    Alcohol use No    Drug use: No      Comment: H/O in the past Marijuana    Sexual activity: Not on file     Other Topics Concern    Not on file     Social History Narrative       Review of Systems  Review of Systems - History obtained from chart review and the patient  General ROS: negative  Psychological ROS: negative  Ophthalmic ROS: positive for - uses glasses  ENT ROS: negative  Allergy and Immunology ROS: negative  Hematological and Lymphatic ROS: negative  Endocrine ROS: negative  Respiratory ROS: no cough, shortness of breath, or wheezing  Cardiovascular ROS: no chest pain or dyspnea on exertion  Gastrointestinal ROS: no abdominal pain, change in bowel habits, or black or bloody stools  Genito-Urinary ROS: no dysuria, trouble voiding, or hematuria  Musculoskeletal ROS: negative  Neurological ROS: no TIA or stroke symptoms  Dermatological ROS: positive for - nail changes    Vital Signs  Visit Vitals    /80 (BP 1 Location: Left arm, BP Patient Position: Sitting)    Pulse 70    Temp 97.2 °F (36.2 °C) (Oral)    Resp 18    Ht 6' (1.829 m)    Wt 207 lb (93.9 kg)    SpO2 99%    BMI 28.07 kg/m2         Physical Exam  Physical Examination: General appearance - alert, well appearing, and in no distress, oriented to person, place, and time, acyanotic, in no respiratory distress and well hydrated  Mental status - alert, oriented to person, place, and time, affect appropriate to mood  Mouth - mucous membranes moist, pharynx normal without lesions  Neck - supple, no significant adenopathy  Lymphatics - no palpable lymphadenopathy  Chest - clear to auscultation, no wheezes, rales or rhonchi, symmetric air entry  Heart - normal rate and regular rhythm, S1 and S2 normal  Neurological - alert, oriented, normal speech, no focal findings or movement disorder noted  Musculoskeletal - osteoarthritic changes noted in both hands  Extremities - intact peripheral pulses  Skin - NAILS: onychomycosis of the toenails    Results  Results for orders placed or performed during the hospital encounter of 02/27/18   HEPATITIS C AB   Result Value Ref Range    Hepatitis C virus Ab 0.08 <0.80 Index    Hep C  virus Ab Interp. NEGATIVE  NEG      Hep C  virus Ab comment         CBC WITH AUTOMATED DIFF   Result Value Ref Range    WBC 4.4 (L) 4.6 - 13.2 K/uL    RBC 5.44 4.70 - 5.50 M/uL    HGB 15.0 13.0 - 16.0 g/dL    HCT 47.1 36.0 - 48.0 %    MCV 86.6 74.0 - 97.0 FL    MCH 27.6 24.0 - 34.0 PG    MCHC 31.8 31.0 - 37.0 g/dL    RDW 12.4 11.6 - 14.5 %    PLATELET 387 069 - 575 K/uL    MPV 10.8 9.2 - 11.8 FL    NEUTROPHILS 53 40 - 73 %    LYMPHOCYTES 32 21 - 52 %    MONOCYTES 12 (H) 3 - 10 %    EOSINOPHILS 1 0 - 5 %    BASOPHILS 2 0 - 2 %    ABS. NEUTROPHILS 2.4 1.8 - 8.0 K/UL    ABS. LYMPHOCYTES 1.4 0.9 - 3.6 K/UL    ABS. MONOCYTES 0.5 0.05 - 1.2 K/UL    ABS. EOSINOPHILS 0.1 0.0 - 0.4 K/UL    ABS.  BASOPHILS 0.1 (H) 0.0 - 0.06 K/UL    DF AUTOMATED     LIPID PANEL   Result Value Ref Range    LIPID PROFILE          Cholesterol, total 220 (H) <200 MG/DL    Triglyceride 49 <150 MG/DL    HDL Cholesterol 72 (H) 40 - 60 MG/DL    LDL, calculated 138.2 (H) 0 - 100 MG/DL    VLDL, calculated 9.8 MG/DL    CHOL/HDL Ratio 3.1 0 - 5.0     METABOLIC PANEL, COMPREHENSIVE   Result Value Ref Range    Sodium 141 136 - 145 mmol/L    Potassium 4.3 3.5 - 5.5 mmol/L    Chloride 103 100 - 108 mmol/L    CO2 29 21 - 32 mmol/L    Anion gap 9 3.0 - 18 mmol/L    Glucose 105 (H) 74 - 99 mg/dL    BUN 19 (H) 7.0 - 18 MG/DL    Creatinine 1.08 0.6 - 1.3 MG/DL    BUN/Creatinine ratio 18 12 - 20      GFR est AA >60 >60 ml/min/1.73m2    GFR est non-AA >60 >60 ml/min/1.73m2    Calcium 9.0 8.5 - 10.1 MG/DL    Bilirubin, total 0.4 0.2 - 1.0 MG/DL    ALT (SGPT) 23 16 - 61 U/L    AST (SGOT) 20 15 - 37 U/L    Alk. phosphatase 85 45 - 117 U/L    Protein, total 8.1 6.4 - 8.2 g/dL    Albumin 4.5 3.4 - 5.0 g/dL    Globulin 3.6 2.0 - 4.0 g/dL    A-G Ratio 1.3 0.8 - 1.7     PSA SCREENING (SCREENING)   Result Value Ref Range    Prostate Specific Ag 4.0 0.0 - 4.0 ng/mL       ASSESSMENT and PLAN    ICD-10-CM ICD-9-CM    1. Essential hypertension I10 401.9 benazepril (LOTENSIN) 40 mg tablet   2. Dyslipidemia E78.5 272.4    3. Onychomycosis B35.1 110.1 terbinafine HCl (LAMISIL) 250 mg tablet   4. Overweight (BMI 25.0-29. 9) E66.3 278.02      lab results and schedule of future lab studies reviewed with patient  reviewed diet, exercise and weight control  reviewed medications and side effects in detail  Discussed the patient's BMI with him. The BMI follow up plan is as follows:   dietary management education, guidance, and counseling  encourage exercise  monitor weight    I have discussed the diagnosis with the patient and the intended plan of care as seen in the above orders. The patient has received an after-visit summary and questions were answered concerning future plans. I have discussed medication, side effects, and warnings with the patient in detail.  The patient verbalized understanding and is in agreement with the plan of care. The patient will contact the office with any additional concerns.     Nestor Mc MD

## 2018-03-27 NOTE — PATIENT INSTRUCTIONS
Body Mass Index: Care Instructions  Your Care Instructions    Body mass index (BMI) can help you see if your weight is raising your risk for health problems. It uses a formula to compare how much you weigh with how tall you are. · A BMI lower than 18.5 is considered underweight. · A BMI between 18.5 and 24.9 is considered healthy. · A BMI between 25 and 29.9 is considered overweight. A BMI of 30 or higher is considered obese. If your BMI is in the normal range, it means that you have a lower risk for weight-related health problems. If your BMI is in the overweight or obese range, you may be at increased risk for weight-related health problems, such as high blood pressure, heart disease, stroke, arthritis or joint pain, and diabetes. If your BMI is in the underweight range, you may be at increased risk for health problems such as fatigue, lower protection (immunity) against illness, muscle loss, bone loss, hair loss, and hormone problems. BMI is just one measure of your risk for weight-related health problems. You may be at higher risk for health problems if you are not active, you eat an unhealthy diet, or you drink too much alcohol or use tobacco products. Follow-up care is a key part of your treatment and safety. Be sure to make and go to all appointments, and call your doctor if you are having problems. It's also a good idea to know your test results and keep a list of the medicines you take. How can you care for yourself at home? · Practice healthy eating habits. This includes eating plenty of fruits, vegetables, whole grains, lean protein, and low-fat dairy. · If your doctor recommends it, get more exercise. Walking is a good choice. Bit by bit, increase the amount you walk every day. Try for at least 30 minutes on most days of the week. · Do not smoke. Smoking can increase your risk for health problems. If you need help quitting, talk to your doctor about stop-smoking programs and medicines. These can increase your chances of quitting for good. · Limit alcohol to 2 drinks a day for men and 1 drink a day for women. Too much alcohol can cause health problems. If you have a BMI higher than 25  · Your doctor may do other tests to check your risk for weight-related health problems. This may include measuring the distance around your waist. A waist measurement of more than 40 inches in men or 35 inches in women can increase the risk of weight-related health problems. · Talk with your doctor about steps you can take to stay healthy or improve your health. You may need to make lifestyle changes to lose weight and stay healthy, such as changing your diet and getting regular exercise. If you have a BMI lower than 18.5  · Your doctor may do other tests to check your risk for health problems. · Talk with your doctor about steps you can take to stay healthy or improve your health. You may need to make lifestyle changes to gain or maintain weight and stay healthy, such as getting more healthy foods in your diet and doing exercises to build muscle. Where can you learn more? Go to http://vazquez-shraddha.info/. Enter S176 in the search box to learn more about \"Body Mass Index: Care Instructions. \"  Current as of: October 13, 2016  Content Version: 11.4  © 6098-2740 [employer]. Care instructions adapted under license by [employer] (which disclaims liability or warranty for this information). If you have questions about a medical condition or this instruction, always ask your healthcare professional. [place] [address] any warranty or liability for your use of this information. Toenail Fungus: Care Instructions  Your Care Instructions  A toenail that is infected by a fungus usually turns white or yellow. As the fungus spreads, the nail turns a darker color and gets thicker, and its edges start to turn ragged and crumble.  A bad infection can cause toe pain, and the nail may pull away from the toe. Toenails that are exposed to moisture and warmth a lot are more likely to get infected by a fungus. This can happen from wearing sweaty shoes often and from walking barefoot on shower floors. It is hard to treat toenail fungus, and the infection can return after it has cleared up. But medicines can sometimes get rid of toenail fungus for good. If the infection is very bad, or if it causes a lot of pain, you may need to have the nail removed. Follow-up care is a key part of your treatment and safety. Be sure to make and go to all appointments, and call your doctor if you are having problems. It's also a good idea to know your test results and keep a list of the medicines you take. How can you care for yourself at home? · Take your medicines exactly as prescribed. Call your doctor if you have any problems with your medicine. You will get more details on the specific medicines your doctor prescribes. · If your doctor gave you a cream or liquid to put on your toenail, use it exactly as directed. · Wash your feet often, and wash your hands after touching your feet. · Keep your toenails clean and dry. Dry your feet completely after you bathe and before you put on shoes and socks. · Keep your toenails trimmed. · Change socks often. Wear dry socks that absorb moisture. · Do not go barefoot in public places. · Use a spray or powder that fights fungus on your feet and in your shoes. · Do not pick at the skin around your nails. · Do not use nail polish or fake nails on your toenails. When should you call for help? Call your doctor now or seek immediate medical care if:  ? · You have signs of infection, such as:  ¨ Increased pain, swelling, warmth, or redness. ¨ Red streaks leading from the site. ¨ Pus draining from the site. ¨ A fever. ? · You have new or increased toe pain. ? Watch closely for changes in your health, and be sure to contact your doctor if:  ? · You do not get better as expected. Where can you learn more? Go to http://vazquez-shraddha.info/. Enter D202 in the search box to learn more about \"Toenail Fungus: Care Instructions. \"  Current as of: October 13, 2016  Content Version: 11.4  © 6116-9643 [employer]. Care instructions adapted under license by [employer] (which disclaims liability or warranty for this information). If you have questions about a medical condition or this instruction, always ask your healthcare professional. [address] any warranty or liability for your use of this information.

## 2018-04-24 ENCOUNTER — OFFICE VISIT (OUTPATIENT)
Dept: FAMILY MEDICINE CLINIC | Age: 68
End: 2018-04-24

## 2018-04-24 VITALS
SYSTOLIC BLOOD PRESSURE: 139 MMHG | BODY MASS INDEX: 28.04 KG/M2 | OXYGEN SATURATION: 97 % | RESPIRATION RATE: 18 BRPM | DIASTOLIC BLOOD PRESSURE: 81 MMHG | WEIGHT: 207 LBS | TEMPERATURE: 98.1 F | HEART RATE: 68 BPM | HEIGHT: 72 IN

## 2018-04-24 DIAGNOSIS — T78.40XD ALLERGIC STATE, SUBSEQUENT ENCOUNTER: ICD-10-CM

## 2018-04-24 DIAGNOSIS — E78.5 DYSLIPIDEMIA: ICD-10-CM

## 2018-04-24 DIAGNOSIS — B35.1 ONYCHOMYCOSIS: ICD-10-CM

## 2018-04-24 DIAGNOSIS — R39.9 LOWER URINARY TRACT SYMPTOMS (LUTS): ICD-10-CM

## 2018-04-24 DIAGNOSIS — M25.50 ARTHRALGIA, UNSPECIFIED JOINT: ICD-10-CM

## 2018-04-24 DIAGNOSIS — I10 ESSENTIAL HYPERTENSION: Primary | ICD-10-CM

## 2018-04-24 RX ORDER — TAMSULOSIN HYDROCHLORIDE 0.4 MG/1
0.4 CAPSULE ORAL DAILY
Qty: 90 CAP | Refills: 1 | Status: SHIPPED | OUTPATIENT
Start: 2018-04-24 | End: 2018-09-11

## 2018-04-24 RX ORDER — CETIRIZINE HCL 10 MG
10 TABLET ORAL DAILY
Qty: 90 TAB | Refills: 1 | Status: SHIPPED | OUTPATIENT
Start: 2018-04-24 | End: 2018-12-11

## 2018-04-24 RX ORDER — ATORVASTATIN CALCIUM 40 MG/1
40 TABLET, FILM COATED ORAL DAILY
Qty: 90 TAB | Refills: 1 | Status: SHIPPED | OUTPATIENT
Start: 2018-04-24 | End: 2018-09-14 | Stop reason: SDUPTHER

## 2018-04-24 RX ORDER — DICLOFENAC POTASSIUM 50 MG/1
50 TABLET, FILM COATED ORAL 3 TIMES DAILY
Qty: 180 TAB | Refills: 0 | Status: SHIPPED | OUTPATIENT
Start: 2018-04-24 | End: 2018-05-08 | Stop reason: ALTCHOICE

## 2018-04-24 RX ORDER — AMLODIPINE BESYLATE 10 MG/1
10 TABLET ORAL DAILY
Qty: 90 TAB | Refills: 1 | Status: SHIPPED | OUTPATIENT
Start: 2018-04-24 | End: 2018-09-14 | Stop reason: SDUPTHER

## 2018-04-24 RX ORDER — BENAZEPRIL HYDROCHLORIDE 40 MG/1
40 TABLET ORAL DAILY
Qty: 90 TAB | Refills: 1 | Status: SHIPPED | OUTPATIENT
Start: 2018-04-24 | End: 2018-12-27 | Stop reason: SDUPTHER

## 2018-04-24 NOTE — PROGRESS NOTES
Chief Complaint   Patient presents with    Follow-up     HTN, Onychomycosis      1. Have you been to the ER, urgent care clinic since your last visit? Hospitalized since your last visit? No    2. Have you seen or consulted any other health care providers outside of the [address] [place] [address] since your last visit? Include any pap smears or colon screening. No     HPI  Nicholas Medley comes in for follow-up care. Hypertension: Patient is on amlodipine and benazepril. Blood pressure is stable. We increased his benazepril to 40 mg daily. He is tolerating this. We will continue with the current treatment plan. Prescriptions were sent to his pharmacy. Onychomycosis: Patient did have onychomycosis of the toenails. We had prescribed Lamisil but the he did read up on the side effects of the medications. We have discussed this during his visit. Eventually decided not to take the medication. Dyslipidemia: Patient on atorvastatin. Tolerating medication. Plan to recheck lipid panel at next visit. L UTS: Patient takes tamsulosin. Would like a refill of medication. Prescription was sent in. Arthralgia: Patient has generalized muscle aches and joint aches. Aches affected mainly lower extremities including the hip and the knees. He does take diclofenac with some good relief. Would like a refill of medication. Allergy: Patient does have seasonal allergies. Currently with the pollen in the environment he is having a lot of sneezing episodes and the nasal congestion. Would like to take medication. In the past has tried cetirizine and this did well for him. Prescription was sent in.       Past Medical History  Past Medical History:   Diagnosis Date    Arthritis     Hypertension     Other ill-defined conditions(964.82)     murmer       Surgical History  Past Surgical History:   Procedure Laterality Date    HX APPENDECTOMY  1961    HX OTHER SURGICAL Left 1990's    Exc. mass foot     HX OTHER SURGICAL Right 1980    release Dequervain's contracture    HX OTHER SURGICAL Left 2014    Removal large nevi behind ear        Medications  Current Outpatient Prescriptions   Medication Sig Dispense Refill    cetirizine (ZYRTEC) 10 mg tablet Take 1 Tab by mouth daily. 90 Tab 1    benazepril (LOTENSIN) 40 mg tablet Take 1 Tab by mouth daily. 90 Tab 1    atorvastatin (LIPITOR) 40 mg tablet Take 1 Tab by mouth daily. 90 Tab 1    diclofenac potassium (CATAFLAM) 50 mg tablet Take 1 Tab by mouth three (3) times daily. 180 Tab 0    tamsulosin (FLOMAX) 0.4 mg capsule Take 1 Cap by mouth daily. 90 Cap 1    amLODIPine (NORVASC) 10 mg tablet Take 1 Tab by mouth daily. 90 Tab 1    travoprost (TRAVATAN Z) 0.004 % ophthalmic solution Administer 1 Drop to both eyes nightly.  omega-3 fatty acids-vitamin e (FISH OIL) 1,000 mg cap Take 1 Cap by mouth daily.  acetaminophen (TYLENOL EXTRA STRENGTH) 500 mg tablet Take 1,000 mg by mouth every six (6) hours as needed for Pain. Allergies  No Known Allergies    Family History  No family history on file. Social History  Social History     Social History    Marital status:      Spouse name: N/A    Number of children: N/A    Years of education: N/A     Occupational History    Not on file. Social History Main Topics    Smoking status: Former Smoker     Quit date: 1/1/1980    Smokeless tobacco: Never Used    Alcohol use No    Drug use: No      Comment: H/O in the past Marijuana    Sexual activity: Not on file     Other Topics Concern    Not on file     Social History Narrative       Review of Systems  Review of Systems - Negative except as noted above in the HPI.     Vital Signs  Visit Vitals    /81 (BP 1 Location: Left arm, BP Patient Position: Sitting)    Pulse 68    Temp 98.1 °F (36.7 °C) (Oral)    Resp 18    Ht 6' (1.829 m)    Wt 207 lb (93.9 kg)    SpO2 97%    BMI 28.07 kg/m2         Physical Exam  Physical Examination: General appearance - alert, well appearing, and in no distress, oriented to person, place, and time and acyanotic, in no respiratory distress  Mental status - affect appropriate to mood  Eyes - sclera anicteric  Mouth - mucous membranes moist, pharynx normal without lesions  Neck - supple, no significant adenopathy  Lymphatics - no palpable lymphadenopathy, no hepatosplenomegaly  Chest - clear to auscultation, no wheezes, rales or rhonchi, symmetric air entry  Heart - normal rate, regular rhythm, normal S1, S2, no murmurs, rubs, clicks or gallops  Back exam - limited range of motion  Neurological - motor and sensory grossly normal bilaterally  Musculoskeletal - osteoarthritic changes noted in both hands  Extremities - no pedal edema noted    Results  Results for orders placed or performed during the hospital encounter of 02/27/18   HEPATITIS C AB   Result Value Ref Range    Hepatitis C virus Ab 0.08 <0.80 Index    Hep C  virus Ab Interp. NEGATIVE  NEG      Hep C  virus Ab comment         CBC WITH AUTOMATED DIFF   Result Value Ref Range    WBC 4.4 (L) 4.6 - 13.2 K/uL    RBC 5.44 4.70 - 5.50 M/uL    HGB 15.0 13.0 - 16.0 g/dL    HCT 47.1 36.0 - 48.0 %    MCV 86.6 74.0 - 97.0 FL    MCH 27.6 24.0 - 34.0 PG    MCHC 31.8 31.0 - 37.0 g/dL    RDW 12.4 11.6 - 14.5 %    PLATELET 691 590 - 740 K/uL    MPV 10.8 9.2 - 11.8 FL    NEUTROPHILS 53 40 - 73 %    LYMPHOCYTES 32 21 - 52 %    MONOCYTES 12 (H) 3 - 10 %    EOSINOPHILS 1 0 - 5 %    BASOPHILS 2 0 - 2 %    ABS. NEUTROPHILS 2.4 1.8 - 8.0 K/UL    ABS. LYMPHOCYTES 1.4 0.9 - 3.6 K/UL    ABS. MONOCYTES 0.5 0.05 - 1.2 K/UL    ABS. EOSINOPHILS 0.1 0.0 - 0.4 K/UL    ABS.  BASOPHILS 0.1 (H) 0.0 - 0.06 K/UL    DF AUTOMATED     LIPID PANEL   Result Value Ref Range    LIPID PROFILE          Cholesterol, total 220 (H) <200 MG/DL    Triglyceride 49 <150 MG/DL    HDL Cholesterol 72 (H) 40 - 60 MG/DL    LDL, calculated 138.2 (H) 0 - 100 MG/DL    VLDL, calculated 9.8 MG/DL    CHOL/HDL Ratio 3.1 0 - 5.0 METABOLIC PANEL, COMPREHENSIVE   Result Value Ref Range    Sodium 141 136 - 145 mmol/L    Potassium 4.3 3.5 - 5.5 mmol/L    Chloride 103 100 - 108 mmol/L    CO2 29 21 - 32 mmol/L    Anion gap 9 3.0 - 18 mmol/L    Glucose 105 (H) 74 - 99 mg/dL    BUN 19 (H) 7.0 - 18 MG/DL    Creatinine 1.08 0.6 - 1.3 MG/DL    BUN/Creatinine ratio 18 12 - 20      GFR est AA >60 >60 ml/min/1.73m2    GFR est non-AA >60 >60 ml/min/1.73m2    Calcium 9.0 8.5 - 10.1 MG/DL    Bilirubin, total 0.4 0.2 - 1.0 MG/DL    ALT (SGPT) 23 16 - 61 U/L    AST (SGOT) 20 15 - 37 U/L    Alk. phosphatase 85 45 - 117 U/L    Protein, total 8.1 6.4 - 8.2 g/dL    Albumin 4.5 3.4 - 5.0 g/dL    Globulin 3.6 2.0 - 4.0 g/dL    A-G Ratio 1.3 0.8 - 1.7     PSA SCREENING (SCREENING)   Result Value Ref Range    Prostate Specific Ag 4.0 0.0 - 4.0 ng/mL       ASSESSMENT and PLAN    ICD-10-CM ICD-9-CM    1. Essential hypertension I10 401.9 benazepril (LOTENSIN) 40 mg tablet      amLODIPine (NORVASC) 10 mg tablet   2. Dyslipidemia E78.5 272.4 atorvastatin (LIPITOR) 40 mg tablet   3. Allergic state, subsequent encounter T78.40XD V58.89 cetirizine (ZYRTEC) 10 mg tablet   4. Lower urinary tract symptoms (LUTS) R39.9 788.99 tamsulosin (FLOMAX) 0.4 mg capsule   5. Arthralgia, unspecified joint M25.50 719.40 diclofenac potassium (CATAFLAM) 50 mg tablet   6. Onychomycosis B35.1 110.1      reviewed diet, exercise and weight control  reviewed medications and side effects in detail    I have discussed the diagnosis with the patient and the intended plan of care as seen in the above orders. The patient has received an after-visit summary and questions were answered concerning future plans. I have discussed medication, side effects, and warnings with the patient in detail. The patient verbalized understanding and is in agreement with the plan of care. The patient will contact the office with any additional concerns.     Felipe Garnica MD

## 2018-04-24 NOTE — MR AVS SNAPSHOT
[facility] [address] [facility] 
[phone] Patient: Mady Granados MRN: [id number] HELLEN:[id number] Visit Information Date & Time Provider Department Dept. Phone Encounter #  
 4/24/2018  7:30 AM Alma Diaz MD [facility] [phone] [id number] Follow-up Instructions Return in about 3 months (around 7/24/2018), or if symptoms worsen or fail to improve, for dyslipidemia, htn. Upcoming Health Maintenance Date Due DTaP/Tdap/Td series (1 - Tdap) 1/15/1970 ZOSTER VACCINE AGE 60> 11/15/2008 Pneumococcal 65+ Low/Medium Risk (1 of 2 - PCV13) 1/15/2014 MEDICARE YEARLY EXAM 3/14/2018 COLONOSCOPY 4/18/2019 GLAUCOMA SCREENING Q2Y 11/16/2019 Allergies as of 4/24/2018  Review Complete On: 4/24/2018 By: Rafael Ray MD  
 No Known Allergies Current Immunizations  Never Reviewed No immunizations on file. Not reviewed this visit You Were Diagnosed With   
  
 Codes Comments Essential hypertension    -  Primary ICD-10-CM: I10 
ICD-9-CM: 401.9 Dyslipidemia     ICD-10-CM: E78.5 ICD-9-CM: 272.4 Allergic state, subsequent encounter     ICD-10-CM: T78.40XD ICD-9-CM: V58.89 Lower urinary tract symptoms (LUTS)     ICD-10-CM: R39.9 ICD-9-CM: 788.99 Arthralgia, unspecified joint     ICD-10-CM: M25.50 ICD-9-CM: 719.40 Onychomycosis     ICD-10-CM: B35.1 ICD-9-CM: 110.1 Vitals BP Pulse Temp Resp Height(growth percentile) Weight(growth percentile) 139/81 (BP 1 Location: Left arm, BP Patient Position: Sitting) 68 98.1 °F (36.7 °C) (Oral) 18 6' (1.829 m) 207 lb (93.9 kg) SpO2 BMI Smoking Status 97% 28.07 kg/m2 Former Smoker Vitals History BMI and BSA Data Body Mass Index Body Surface Area 28.07 kg/m 2 2.18 m 2 Preferred Pharmacy Pharmacy Name Phone [address] [place] [address], [address] [place] [address] [address] Jessie Salcido Your Updated Medication List  
  
   
This list is accurate as of 4/24/18  8:01 AM.  Always use your most recent med list. amLODIPine 10 mg tablet Commonly known as:  Pam Grebe Take 1 Tab by mouth daily. atorvastatin 40 mg tablet Commonly known as:  LIPITOR Take 1 Tab by mouth daily. benazepril 40 mg tablet Commonly known as:  LOTENSIN Take 1 Tab by mouth daily. cetirizine 10 mg tablet Commonly known as:  ZYRTEC Take 1 Tab by mouth daily. diclofenac potassium 50 mg tablet Commonly known as:  CATAFLAM  
Take 1 Tab by mouth three (3) times daily. FISH OIL 1,000 mg Cap Generic drug:  omega-3 fatty acids-vitamin e Take 1 Cap by mouth daily. tamsulosin 0.4 mg capsule Commonly known as:  FLOMAX Take 1 Cap by mouth daily. TRAVATAN Z 0.004 % ophthalmic solution Generic drug:  travoprost  
Administer 1 Drop to both eyes nightly. TYLENOL EXTRA STRENGTH 500 mg tablet Generic drug:  acetaminophen Take 1,000 mg by mouth every six (6) hours as needed for Pain. Prescriptions Sent to Pharmacy Refills  
 cetirizine (ZYRTEC) 10 mg tablet 1 Sig: Take 1 Tab by mouth daily. Class: Normal  
 Pharmacy: [facility] [address] Ph #: [phone] Route: Oral  
 benazepril (LOTENSIN) 40 mg tablet 1 Sig: Take 1 Tab by mouth daily. Class: Normal  
 Pharmacy: [facility] [address] Ph #: [phone] Route: Oral  
 atorvastatin (LIPITOR) 40 mg tablet 1 Sig: Take 1 Tab by mouth daily. Class: Normal  
 Pharmacy: [facility] [address] Ph #: [phone] Route: Oral  
 diclofenac potassium (CATAFLAM) 50 mg tablet 0 Sig: Take 1 Tab by mouth three (3) times daily.   
 Class: Normal  
 Pharmacy: [facility] [address]  #: [phone] Route: Oral  
 tamsulosin (FLOMAX) 0.4 mg capsule 1 Sig: Take 1 Cap by mouth daily. Class: Normal  
 Pharmacy: [facility] [address] Ph #: [phone] Route: Oral  
 amLODIPine (NORVASC) 10 mg tablet 1 Sig: Take 1 Tab by mouth daily. Class: Normal  
 Pharmacy: [facility] [address] Ph #: [phone] Route: Oral  
  
Follow-up Instructions Return in about 3 months (around 7/24/2018), or if symptoms worsen or fail to improve, for dyslipidemia, htn. Introducing [facility]! [employer] introduces [employer] patient portal. Now you can access parts of your medical record, email your doctor's office, and request medication refills online. 1. In your internet browser, go to [email or web address] 2. Click on the First Time User? Click Here link in the Sign In box. You will see the New Member Sign Up page. 3. Enter your [employer] Access Code exactly as it appears below. You will not need to use this code after youve completed the sign-up process. If you do not sign up before the expiration date, you must request a new code. · [employer] Access Code: [email or web address] Expires: 5/28/2018 10:24 AM 
 
4. Enter the last four digits of your Social Security Number (xxxx) and Date of Birth (mm/dd/yyyy) as indicated and click Submit. You will be taken to the next sign-up page. 5. Create a [employer] ID. This will be your [employer] login ID and cannot be changed, so think of one that is secure and easy to remember. 6. Create a [employer] password. You can change your password at any time. 7. Enter your Password Reset Question and Answer. This can be used at a later time if you forget your password. 8. Enter your e-mail address. You will receive e-mail notification when new information is available in [address] 9. Click Sign Up. You can now view and download portions of your medical record. 10. Click the Download Summary menu link to download a portable copy of your medical information. If you have questions, please visit the Frequently Asked Questions section of the [employer] website. Remember, [employer] is NOT to be used for urgent needs. For medical emergencies, dial 911. Now available from your iPhone and Android! Please provide this summary of care documentation to your next provider. Your primary care clinician is listed as Alma Cramer. If you have any questions after today's visit, please call [phone].

## 2018-05-02 ENCOUNTER — TELEPHONE (OUTPATIENT)
Dept: FAMILY MEDICINE CLINIC | Age: 68
End: 2018-05-02

## 2018-05-08 RX ORDER — DICLOFENAC SODIUM 75 MG/1
75 TABLET, DELAYED RELEASE ORAL
Qty: 180 TAB | Refills: 0 | Status: SHIPPED | OUTPATIENT
Start: 2018-05-08 | End: 2018-06-27 | Stop reason: SDUPTHER

## 2018-05-08 RX ORDER — DICLOFENAC SODIUM 50 MG/1
50 TABLET, DELAYED RELEASE ORAL
Qty: 90 TAB | Refills: 1 | Status: SHIPPED | OUTPATIENT
Start: 2018-05-08 | End: 2018-05-08

## 2018-05-23 ENCOUNTER — OFFICE VISIT (OUTPATIENT)
Dept: FAMILY MEDICINE CLINIC | Age: 68
End: 2018-05-23

## 2018-05-23 ENCOUNTER — HOSPITAL ENCOUNTER (OUTPATIENT)
Dept: LAB | Age: 68
Discharge: HOME | End: 2018-05-23
Payer: MEDICARE

## 2018-05-23 VITALS
RESPIRATION RATE: 18 BRPM | WEIGHT: 206 LBS | HEIGHT: 72 IN | OXYGEN SATURATION: 98 % | BODY MASS INDEX: 27.9 KG/M2 | HEART RATE: 59 BPM | DIASTOLIC BLOOD PRESSURE: 78 MMHG | TEMPERATURE: 98.2 F | SYSTOLIC BLOOD PRESSURE: 140 MMHG

## 2018-05-23 DIAGNOSIS — I10 ESSENTIAL HYPERTENSION: ICD-10-CM
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
## 2018-05-23 DIAGNOSIS — M25.552 PAIN OF BOTH HIP JOINTS: ICD-10-CM

## 2018-05-23 DIAGNOSIS — M25.551 PAIN OF BOTH HIP JOINTS: ICD-10-CM

## 2018-05-23 PROCEDURE — 86618 LYME DISEASE ANTIBODY: CPT | Performed by: FAMILY MEDICINE

## 2018-05-23 RX ORDER — DOXYCYCLINE 100 MG/1
100 TABLET ORAL 2 TIMES DAILY
Qty: 20 TAB | Refills: 0 | Status: SHIPPED | OUTPATIENT
Start: 2018-05-23 | End: 2018-06-02

## 2018-05-23 RX ORDER — MUPIROCIN 20 MG/G
OINTMENT TOPICAL DAILY
Qty: 22 G | Refills: 0 | Status: SHIPPED | OUTPATIENT
Start: 2018-05-23 | End: 2018-07-24

## 2018-05-23 NOTE — MR AVS SNAPSHOT
[facility] [address] [facility] 
[phone] Patient: Aquiles Wall MRN: [id number] KFI:[id number] Visit Information Date & Time Provider Department Dept. Phone Encounter #  
 5/23/2018  4:15 PM MD Gonzalez Coleman [id number] Follow-up Instructions Return if symptoms worsen or fail to improve. Your Appointments 5/23/2018  4:15 PM  
SAME DAY with MD Gonzalez Coleman ([address]) Appt Note: walk in - tick bite [address] [place] [address]  
[phone]  
  
   
 [address]  
  
    
 7/24/2018  8:30 AM  
ROUTINE CARE with MD Gonzalez Coleman ([address] [place] [address] Road) Appt Note: htn, dyslipidemia [address] [id number] [address]  
  
   
 [address] [facility] Upcoming Health Maintenance Date Due DTaP/Tdap/Td series (1 - Tdap) 1/15/1970 ZOSTER VACCINE AGE 60> 11/15/2008 Pneumococcal 65+ Low/Medium Risk (1 of 2 - PCV13) 1/15/2014 MEDICARE YEARLY EXAM 3/14/2018 Influenza Age 5 to Adult 8/1/2018 COLONOSCOPY 4/18/2019 GLAUCOMA SCREENING Q2Y 11/16/2019 Allergies as of 5/23/2018  Review Complete On: 5/23/2018 By: Matthias Villegas MD  
 No Known Allergies Current Immunizations  Never Reviewed No immunizations on file. Not reviewed this visit You Were Diagnosed With   
  
 Codes Comments Tick bite, initial encounter    -  Primary ICD-10-CM: W57. Andrew Dangelo ICD-9-CM: 919.4, E906.4 Essential hypertension     ICD-10-CM: I10 
ICD-9-CM: 401.9 Pain of both hip joints     ICD-10-CM: M25.551, M25.552 ICD-9-CM: 719.45 Vitals BP Pulse Temp Resp Height(growth percentile) Weight(growth percentile) 140/78 (BP 1 Location: Left arm, BP Patient Position: Sitting) (!) 59 98.2 °F (36.8 °C) (Oral) 18 6' (1.829 m) 206 lb (93.4 kg) SpO2 BMI Smoking Status 98% 27.94 kg/m2 Former Smoker BMI and BSA Data Body Mass Index Body Surface Area  
 27.94 kg/m 2 2.18 m 2 Preferred Pharmacy Pharmacy Name Phone Girish Lawson [address], [address] [facility] Your Updated Medication List  
  
   
This list is accurate as of 5/23/18  2:37 PM.  Always use your most recent med list. amLODIPine 10 mg tablet Commonly known as:  Job Dub Take 1 Tab by mouth daily. atorvastatin 40 mg tablet Commonly known as:  LIPITOR Take 1 Tab by mouth daily. benazepril 40 mg tablet Commonly known as:  LOTENSIN Take 1 Tab by mouth daily. cetirizine 10 mg tablet Commonly known as:  ZYRTEC Take 1 Tab by mouth daily. diclofenac EC 75 mg EC tablet Commonly known as:  VOLTAREN Take 1 Tab by mouth two (2) times daily as needed. doxycycline 100 mg tablet Commonly known as:  ADOXA Take 1 Tab by mouth two (2) times a day for 10 days. FISH OIL 1,000 mg Cap Generic drug:  omega-3 fatty acids-vitamin e Take 1 Cap by mouth daily. mupirocin 2 % ointment Commonly known as:  [employer] Apply  to affected area daily. tamsulosin 0.4 mg capsule Commonly known as:  FLOMAX Take 1 Cap by mouth daily. TRAVATAN Z 0.004 % ophthalmic solution Generic drug:  travoprost  
Administer 1 Drop to both eyes nightly. TYLENOL EXTRA STRENGTH 500 mg tablet Generic drug:  acetaminophen Take 1,000 mg by mouth every six (6) hours as needed for Pain. Prescriptions Sent to Pharmacy Refills  
 doxycycline (ADOXA) 100 mg tablet 0 Sig: Take 1 Tab by mouth two (2) times a day for 10 days. Class: Normal  
 Pharmacy: [facility] [address] STEVE AGUIRRE [address] [facility] MAIN Ph #: [phone] Route: Oral  
 mupirocin (BACTROBAN) 2 % ointment 0 Sig: Apply  to affected area daily. Class: Normal  
 Pharmacy: [facility] [address] STEVE AGUIRRE [address] MA[address] Ph #: [phone] Route: Topical  
  
Follow-up Instructions Return if symptoms worsen or fail to improve. To-Do List   
 05/23/2018 Lab:  LYME AB TOTAL W/RFLX W BLOT Introducing [facility]! Gertrude Hurst introduces [employer] patient portal. Now you can access parts of your medical record, email your doctor's office, and request medication refills online. 1. In your internet browser, go to [email or web address] 2. Click on the First Time User? Click Here link in the Sign In box. You will see the New Member Sign Up page. 3. Enter your E[employer] Access Code exactly as it appears below. You will not need to use this code after youve completed the sign-up process. If you do not sign up before the expiration date, you must request a new code. · [employer] Access Code: [email or web address] Expires: 5/28/2018 10:24 AM 
 
4. Enter the last four digits of your Social Security Number (xxxx) and Date of Birth (mm/dd/yyyy) as indicated and click Submit. You will be taken to the next sign-up page. 5. Create a E[employer] ID. This will be your [employer] login ID and cannot be changed, so think of one that is secure and easy to remember. 6. Create a [employer] password. You can change your password at any time. 7. Enter your Password Reset Question and Answer. This can be used at a later time if you forget your password. 8. Enter your e-mail address. You will receive e-mail notification when new information is available in [address] 9. Click Sign Up. You can now view and download portions of your medical record. 10. Click the Download Summary menu link to download a portable copy of your medical information.  
 
If you have questions, please visit the Frequently Asked Questions section of the [employer]. Remember, MyChart is NOT to be used for urgent needs. For medical emergencies, dial 911. Now available from your iPhone and Android! Please provide this summary of care documentation to your next provider. Lyme Disease Testing Disclaimer:   
 § 65.6-5755.0. (Expires July 1, 2018) Lyme disease testing information disclosure. A. Every licensee or his in-office designee who orders a laboratory test for the presence of Lyme disease shall provide to the patient or his legal representative the following written information: \"ACCORDING TO THE CENTERS FOR DISEASE CONTROL AND PREVENTION, AS OF 2011 LYME DISEASE IS THE SIXTH FASTEST GROWING DISEASE IN THE [place]. YOUR HEALTH CARE PROVIDER HAS ORDERED A LABORATORY TEST FOR THE PRESENCE OF LYME DISEASE FOR YOU. CURRENT LABORATORY TESTING FOR LYME DISEASE CAN BE PROBLEMATIC AND STANDARD LABORATORY TESTS OFTEN RESULT IN FALSE NEGATIVE AND FALSE POSITIVE RESULTS, AND IF DONE TOO EARLY, YOU MAY NOT HAVE PRODUCED ENOUGH ANTIBODIES TO BE CONSIDERED POSITIVE BECAUSE YOUR IMMUNE RESPONSE REQUIRES TIME TO DEVELOP ANTIBODIES. IF YOU ARE TESTED FOR LYME DISEASE, AND THE RESULTS ARE NEGATIVE, THIS DOES NOT NECESSARILY MEAN YOU DO NOT HAVE LYME DISEASE. IF YOU CONTINUE TO EXPERIENCE SYMPTOMS, YOU SHOULD CONTACT YOUR HEALTH CARE PROVIDER AND INQUIRE ABOUT THE APPROPRIATENESS OF RETESTING OR ADDITIONAL TREATMENT. \"  
B. Licensees shall be immune from civil liability for the provision of the written information required by this section absent gross negligence or willful misconduct. Your primary care clinician is listed as Alma Cramer. If you have any questions after today's visit, please call [phone].

## 2018-05-23 NOTE — PROGRESS NOTES
Chief Complaint   Patient presents with   [address]     left foot      1. Have you been to the ER, urgent care clinic since your last visit? Hospitalized since your last visit? No    2. Have you seen or consulted any other health care providers outside of the [facility] since your last visit? Include any pap smears or colon screening. No     HPI  Eleanor Grand comes in for acute care. Chief complaint: Patient has tick bites left foot medial aspect. He just got a new puppy and he believes this is where the tick emanated from. Patient has itching and some redness around the site. He would like to be checked for Lyme disease. I will do Lyme titers. He does have an area that is erythematous with possible insect bite and I will put him on antibiotics for this. I will follow-up with results. Hypertension: Patient's blood pressure is elevated today. He is on Norvasc and Lotensin. We will have him continue with the current treatment plan. I will follow-up at next visit. If still elevated we will consider adjustment of medication. Back pain: Patient has pain lower back and sacral area that radiates down to his lower extremities. Occasional numbness and tingling. He has trouble getting up to start walking. There is a question of lumbar stenosis. After discussion I did offer to send him to the spine clinic for an evaluation  but patient would prefer to hold off on this. He is taking diclofenac 75 mg twice a day for this. At times he has just taken the 2 pills at the same time. I did caution him against that and the did emphasize that he should take the medication as prescribed.     Past Medical History  Past Medical History:   Diagnosis Date    Arthritis     Hypertension     Other ill-defined conditions(881.34)     murmer       Surgical History  Past Surgical History:   Procedure Laterality Date    HX APPENDECTOMY  1961    HX OTHER SURGICAL Left 1990's    Exc. mass foot     HX OTHER SURGICAL Right 1980    release Dequervain's contracture    HX OTHER SURGICAL Left 2014    Removal large nevi behind ear        Medications  Current Outpatient Prescriptions   Medication Sig Dispense Refill    doxycycline (ADOXA) 100 mg tablet Take 1 Tab by mouth two (2) times a day for 10 days. 20 Tab 0    mupirocin (BACTROBAN) 2 % ointment Apply  to affected area daily. 22 g 0    diclofenac EC (VOLTAREN) 75 mg EC tablet Take 1 Tab by mouth two (2) times daily as needed. 180 Tab 0    cetirizine (ZYRTEC) 10 mg tablet Take 1 Tab by mouth daily. 90 Tab 1    benazepril (LOTENSIN) 40 mg tablet Take 1 Tab by mouth daily. 90 Tab 1    atorvastatin (LIPITOR) 40 mg tablet Take 1 Tab by mouth daily. 90 Tab 1    tamsulosin (FLOMAX) 0.4 mg capsule Take 1 Cap by mouth daily. 90 Cap 1    amLODIPine (NORVASC) 10 mg tablet Take 1 Tab by mouth daily. 90 Tab 1    travoprost (TRAVATAN Z) 0.004 % ophthalmic solution Administer 1 Drop to both eyes nightly.  omega-3 fatty acids-vitamin e (FISH OIL) 1,000 mg cap Take 1 Cap by mouth daily.  acetaminophen (TYLENOL EXTRA STRENGTH) 500 mg tablet Take 1,000 mg by mouth every six (6) hours as needed for Pain. Allergies  No Known Allergies    Family History  No family history on file. Social History  Social History     Social History    Marital status:      Spouse name: N/A    Number of children: N/A    Years of education: N/A     Occupational History    Not on file.      Social History Main Topics    Smoking status: Former Smoker     Quit date: 1/1/1980    Smokeless tobacco: Never Used    Alcohol use No    Drug use: No      Comment: H/O in the past Marijuana    Sexual activity: Not on file     Other Topics Concern    Not on file     Social History Narrative       Review of Systems  Review of Systems - History obtained from chart review and the patient  General ROS: negative  Psychological ROS: negative  Ophthalmic ROS: positive for - uses glasses  Respiratory ROS: no cough, shortness of breath, or wheezing  Cardiovascular ROS: no chest pain or dyspnea on exertion  Gastrointestinal ROS: no abdominal pain, change in bowel habits, or black or bloody stools  Genito-Urinary ROS: negative  Musculoskeletal ROS: positive for - joint pain and pain in back - lower  Neurological ROS: positive for - numbness/tingling   Dermatological ROS:     Vital Signs  Visit Vitals    /78 (BP 1 Location: Left arm, BP Patient Position: Sitting)    Pulse (!) 59    Temp 98.2 °F (36.8 °C) (Oral)    Resp 18    Ht 6' (1.829 m)    Wt 206 lb (93.4 kg)    SpO2 98%    BMI 27.94 kg/m2         Physical Exam  Physical Examination: General appearance - alert, well appearing, and in no distress and acyanotic, in no respiratory distress  Mental status - alert, oriented to person, place, and time  Neck - supple, no significant adenopathy  Lymphatics - no palpable lymphadenopathy  Chest - clear to auscultation, no wheezes, rales or rhonchi, symmetric air entry  Heart - normal rate, regular rhythm, normal S1, S2, no murmurs, rubs, clicks or gallops  Neurological - alert, oriented, normal speech, no focal findings or movement disorder noted  Musculoskeletal - osteoarthritic changes noted in both hands  Extremities - pedal edema 1 +, intact peripheral pulses  Skin - Patient has an erythematous macular area left foot that measures about 1 x 1 cm. No drainage. No streaking noted. Results  Results for orders placed or performed during the hospital encounter of 02/27/18   HEPATITIS C AB   Result Value Ref Range    Hepatitis C virus Ab 0.08 <0.80 Index    Hep C  virus Ab Interp.  NEGATIVE  NEG      Hep C  virus Ab comment         CBC WITH AUTOMATED DIFF   Result Value Ref Range    WBC 4.4 (L) 4.6 - 13.2 K/uL    RBC 5.44 4.70 - 5.50 M/uL    HGB 15.0 13.0 - 16.0 g/dL    HCT 47.1 36.0 - 48.0 %    MCV 86.6 74.0 - 97.0 FL    MCH 27.6 24.0 - 34.0 PG    MCHC 31.8 31.0 - 37.0 g/dL    RDW 12.4 11.6 - 14.5 %    PLATELET 065 219 - 351 K/uL    MPV 10.8 9.2 - 11.8 FL    NEUTROPHILS 53 40 - 73 %    LYMPHOCYTES 32 21 - 52 %    MONOCYTES 12 (H) 3 - 10 %    EOSINOPHILS 1 0 - 5 %    BASOPHILS 2 0 - 2 %    ABS. NEUTROPHILS 2.4 1.8 - 8.0 K/UL    ABS. LYMPHOCYTES 1.4 0.9 - 3.6 K/UL    ABS. MONOCYTES 0.5 0.05 - 1.2 K/UL    ABS. EOSINOPHILS 0.1 0.0 - 0.4 K/UL    ABS. BASOPHILS 0.1 (H) 0.0 - 0.06 K/UL    DF AUTOMATED     LIPID PANEL   Result Value Ref Range    LIPID PROFILE          Cholesterol, total 220 (H) <200 MG/DL    Triglyceride 49 <150 MG/DL    HDL Cholesterol 72 (H) 40 - 60 MG/DL    LDL, calculated 138.2 (H) 0 - 100 MG/DL    VLDL, calculated 9.8 MG/DL    CHOL/HDL Ratio 3.1 0 - 5.0     METABOLIC PANEL, COMPREHENSIVE   Result Value Ref Range    Sodium 141 136 - 145 mmol/L    Potassium 4.3 3.5 - 5.5 mmol/L    Chloride 103 100 - 108 mmol/L    CO2 29 21 - 32 mmol/L    Anion gap 9 3.0 - 18 mmol/L    Glucose 105 (H) 74 - 99 mg/dL    BUN 19 (H) 7.0 - 18 MG/DL    Creatinine 1.08 0.6 - 1.3 MG/DL    BUN/Creatinine ratio 18 12 - 20      GFR est AA >60 >60 ml/min/1.73m2    GFR est non-AA >60 >60 ml/min/1.73m2    Calcium 9.0 8.5 - 10.1 MG/DL    Bilirubin, total 0.4 0.2 - 1.0 MG/DL    ALT (SGPT) 23 16 - 61 U/L    AST (SGOT) 20 15 - 37 U/L    Alk. phosphatase 85 45 - 117 U/L    Protein, total 8.1 6.4 - 8.2 g/dL    Albumin 4.5 3.4 - 5.0 g/dL    Globulin 3.6 2.0 - 4.0 g/dL    A-G Ratio 1.3 0.8 - 1.7     PSA SCREENING (SCREENING)   Result Value Ref Range    Prostate Specific Ag 4.0 0.0 - 4.0 ng/mL       ASSESSMENT and PLAN    ICD-10-CM ICD-9-CM    1. Tick bite, initial encounter W57. XXXA 919.4 doxycycline (ADOXA) 100 mg tablet     E906.4 mupirocin (BACTROBAN) 2 % ointment      LYME AB TOTAL W/RFLX W BLOT   2. Essential hypertension I10 401.9    3.  Pain of both hip joints M25.551 719.45     M25.552       lab results and schedule of future lab studies reviewed with patient  reviewed diet, exercise and weight control  reviewed medications and side effects in detail    I have discussed the diagnosis with the patient and the intended plan of care as seen in the above orders. The patient has received an after-visit summary and questions were answered concerning future plans. I have discussed medication, side effects, and warnings with the patient in detail. The patient verbalized understanding and is in agreement with the plan of care. The patient will contact the office with any additional concerns.     Cezar Story MD

## 2018-05-24 LAB — B BURGDOR IGG+IGM SER-ACNC: <0.91 ISR (ref 0–0.9)

## 2018-06-27 RX ORDER — DICLOFENAC SODIUM 75 MG/1
75 TABLET, DELAYED RELEASE ORAL
Qty: 180 TAB | Refills: 0 | Status: SHIPPED | OUTPATIENT
Start: 2018-06-27 | End: 2018-09-11

## 2018-07-24 ENCOUNTER — OFFICE VISIT (OUTPATIENT)
Dept: FAMILY MEDICINE CLINIC | Age: 68
End: 2018-07-24

## 2018-07-24 VITALS
RESPIRATION RATE: 20 BRPM | TEMPERATURE: 98.2 F | HEART RATE: 70 BPM | HEIGHT: 72 IN | SYSTOLIC BLOOD PRESSURE: 139 MMHG | BODY MASS INDEX: 28.04 KG/M2 | DIASTOLIC BLOOD PRESSURE: 78 MMHG | WEIGHT: 207 LBS | OXYGEN SATURATION: 95 %

## 2018-07-24 DIAGNOSIS — N52.9 ERECTILE DYSFUNCTION, UNSPECIFIED ERECTILE DYSFUNCTION TYPE: ICD-10-CM

## 2018-07-24 DIAGNOSIS — I10 ESSENTIAL HYPERTENSION: Primary | ICD-10-CM

## 2018-07-24 DIAGNOSIS — R39.9 LOWER URINARY TRACT SYMPTOMS (LUTS): ICD-10-CM

## 2018-07-24 NOTE — PROGRESS NOTES
Chief Complaint   Patient presents with    Follow Up Chronic Condition     pt here for follow up chronic conditions     1. Have you been to the ER, urgent care clinic since your last visit? Hospitalized since your last visit? No    2. Have you seen or consulted any other health care providers outside of the [address] [place] [address] since your last visit? Include any pap smears or colon screening. No     [facility]  Pauline Pitts comes in for follow-up care. Hypertension: Patient takes Lotensin and Norvasc. Blood pressure has been stable. We will continue with the current treatment plan. L UTS: Patient has a sensation of incomplete bladder emptying. Has been seen by the urologist in the past for this. He is on Flomax. He does not seem to be helping much. He has post micturition dribbling. Denies dysuria or hematuria. Denies flank pain, fever or chills. I will refer him to urologist for evaluation and management. Erectile dysfunction: Patient has erectile dysfunction. He has trouble getting an erection and when he does get one it is not strong enough and does not last.  He also does have Peyronie's disease. He has in the past refused penile pump. He has also used penile injections of Cialis. He also did try Viagra. Overall nothing seems to help. I will refer him to the urologist to discuss options on erectile dysfunction.     Past Medical History  Past Medical History:   Diagnosis Date    Arthritis     Hypertension     Other ill-defined conditions(936.93)     murmer       Surgical History  Past Surgical History:   Procedure Laterality Date    HX APPENDECTOMY  1961    HX OTHER SURGICAL Left 1990's    Exc. mass foot     HX OTHER SURGICAL Right 1980    release Dequervain's contracture    HX OTHER SURGICAL Left 2014    Removal large nevi behind ear        Medications  Current Outpatient Prescriptions   Medication Sig Dispense Refill    diclofenac EC (VOLTAREN) 75 mg EC tablet Take 1 Tab by mouth two (2) times daily as needed. 180 Tab 0    cetirizine (ZYRTEC) 10 mg tablet Take 1 Tab by mouth daily. 90 Tab 1    benazepril (LOTENSIN) 40 mg tablet Take 1 Tab by mouth daily. 90 Tab 1    atorvastatin (LIPITOR) 40 mg tablet Take 1 Tab by mouth daily. 90 Tab 1    tamsulosin (FLOMAX) 0.4 mg capsule Take 1 Cap by mouth daily. 90 Cap 1    amLODIPine (NORVASC) 10 mg tablet Take 1 Tab by mouth daily. 90 Tab 1    travoprost (TRAVATAN Z) 0.004 % ophthalmic solution Administer 1 Drop to both eyes nightly.  acetaminophen (TYLENOL EXTRA STRENGTH) 500 mg tablet Take 1,000 mg by mouth every six (6) hours as needed for Pain. Allergies  No Known Allergies    Family History  History reviewed. No pertinent family history. Social History  Social History     Social History    Marital status:      Spouse name: N/A    Number of children: N/A    Years of education: N/A     Occupational History    Not on file. Social History Main Topics    Smoking status: Former Smoker     Quit date: 1/1/1980    Smokeless tobacco: Never Used    Alcohol use No    Drug use: No      Comment: H/O in the past Marijuana    Sexual activity: Not on file     Other Topics Concern    Not on file     Social History Narrative       Review of Systems  Review of Systems - Negative except as noted above in the HPI.     Vital Signs  Visit Vitals    /78 (BP 1 Location: Left arm, BP Patient Position: Sitting)    Pulse 70    Temp 98.2 °F (36.8 °C) (Oral)    Resp 20    Ht 6' (1.829 m)    Wt 207 lb (93.9 kg)    SpO2 95%    BMI 28.07 kg/m2         Physical Exam  Physical Examination: General appearance - alert, well appearing, and in no distress, oriented to person, place, and time and acyanotic, in no respiratory distress  Mental status - alert, oriented to person, place, and time  Chest - clear to auscultation, no wheezes, rales or rhonchi, symmetric air entry  Heart - normal rate, regular rhythm, normal S1, S2, no murmurs, rubs, clicks or gallops  Neurological - alert, oriented, normal speech, no focal findings or movement disorder noted  Musculoskeletal - osteoarthritic changes noted in both hands    Results  Results for orders placed or performed during the hospital encounter of 05/23/18   LYME AB TOTAL W/RFLX W BLOT   Result Value Ref Range    Lyme Ab, IgG/IgM <0.91 0.00 - 0.90 ISR       ASSESSMENT and PLAN    ICD-10-CM ICD-9-CM    1. Essential hypertension I10 401.9    2. Erectile dysfunction, unspecified erectile dysfunction type N52.9 607.84 REFERRAL TO UROLOGY   3. Lower urinary tract symptoms (LUTS) R39.9 788.99 REFERRAL TO UROLOGY     reviewed diet, exercise and weight control  reviewed medications and side effects in detail    I have discussed the diagnosis with the patient and the intended plan of care as seen in the above orders. The patient has received an after-visit summary and questions were answered concerning future plans. I have discussed medication, side effects, and warnings with the patient in detail. The patient verbalized understanding and is in agreement with the plan of care. The patient will contact the office with any additional concerns.     Noreen Ge MD

## 2018-07-24 NOTE — MR AVS SNAPSHOT
[facility] [address] [phone] Patient: Staci Murphy MRN: [id number] DR:9/82/9416 Visit Information Date & Time Provider Department Dept. Phone Encounter #  
 7/24/2018  8:30 AM Antoineed MD Gonzalez Laura Sa [phone] [id number] Follow-up Instructions Return if symptoms worsen or fail to improve. Your Appointments 9/11/2018  8:30 AM  
Office Visit with MD oGnzalez Manley ([facility]) Appt Note:   
 148 [facility][note 3] 107 [facility] 49  
  
   
 [facility] [address]. [address]. [address] [facility] Upcoming Health Maintenance Date Due DTaP/Tdap/Td series (1 - Tdap) 1/15/1970 ZOSTER VACCINE AGE 60> 11/15/2008 Pneumococcal 65+ Low/Medium Risk (1 of 2 - PCV13) 1/15/2014 MEDICARE YEARLY EXAM 3/14/2018 Influenza Age 5 to Adult 8/1/2018 COLONOSCOPY 4/18/2019 GLAUCOMA SCREENING Q2Y 11/16/2019 Allergies as of 7/24/2018  Review Complete On: 7/24/2018 By: Jovanna Santiago LPN No Known Allergies Current Immunizations  Never Reviewed No immunizations on file. Not reviewed this visit You Were Diagnosed With   
  
 Codes Comments Essential hypertension    -  Primary ICD-10-CM: I10 
ICD-9-CM: 401.9 Erectile dysfunction, unspecified erectile dysfunction type     ICD-10-CM: N52.9 ICD-9-CM: 607.84 Lower urinary tract symptoms (LUTS)     ICD-10-CM: R39.9 ICD-9-CM: 788.99 Vitals BP Pulse Temp Resp Height(growth percentile) Weight(growth percentile) 143/78 (BP 1 Location: Left arm, BP Patient Position: Sitting) 70 98.2 °F (36.8 °C) (Oral) 20 6' (1.829 m) 207 lb (93.9 kg) SpO2 BMI Smoking Status 95% 28.07 kg/m2 Former Smoker Vitals History BMI and BSA Data Body Mass Index Body Surface Area 28.07 kg/m 2 2.18 m 2 Preferred Pharmacy Pharmacy Name Phone 305 [facility], [address] [facility] [address] Jessie Salcido Your Updated Medication List  
  
   
This list is accurate as of 7/24/18  9:10 AM.  Always use your most recent med list. amLODIPine 10 mg tablet Commonly known as:  Dyson Jacobo Take 1 Tab by mouth daily. atorvastatin 40 mg tablet Commonly known as:  LIPITOR Take 1 Tab by mouth daily. benazepril 40 mg tablet Commonly known as:  LOTENSIN Take 1 Tab by mouth daily. cetirizine 10 mg tablet Commonly known as:  ZYRTEC Take 1 Tab by mouth daily. diclofenac EC 75 mg EC tablet Commonly known as:  VOLTAREN Take 1 Tab by mouth two (2) times daily as needed. tamsulosin 0.4 mg capsule Commonly known as:  FLOMAX Take 1 Cap by mouth daily. TRAVATAN Z 0.004 % ophthalmic solution Generic drug:  travoprost  
Administer 1 Drop to both eyes nightly. TYLENOL EXTRA STRENGTH 500 mg tablet Generic drug:  acetaminophen Take 1,000 mg by mouth every six (6) hours as needed for Pain. We Performed the Following REFERRAL TO UROLOGY [GEV740 Custom] Follow-up Instructions Return if symptoms worsen or fail to improve. Referral Information Referral ID Referred By Referred To  
  
 [id number] Uma Vasquez MD   
   [address], [address] [place] [address] 434,[facility] 300 Phone: [phone] Fax: [phone] Visits Status Start Date End Date 1 New Request 7/24/18 7/24/19 If your referral has a status of pending review or denied, additional information will be sent to support the outcome of this decision. Introducing [facility] & HEALTH SERVICES! Austin Rocha introduces RedT patient portal. Now you can access parts of your medical record, email your doctor's office, and request medication refills online. 1. In your internet browser, go to [email or web address] 2. Click on the First Time User? Click Here link in the Sign In box. You will see the New Member Sign Up page. 3. Enter your Kids Quizine Access Code exactly as it appears below. You will not need to use this code after youve completed the sign-up process. If you do not sign up before the expiration date, you must request a new code. · Kids Quizine Access Code: [email or web address] Expires: 10/22/2018  8:22 AM 
 
4. Enter the last four digits of your Social Security Number (xxxx) and Date of Birth (mm/dd/yyyy) as indicated and click Submit. You will be taken to the next sign-up page. 5. Create a Broadchoicet ID. This will be your Kids Quizine login ID and cannot be changed, so think of one that is secure and easy to remember. 6. Create a Kids Quizine password. You can change your password at any time. 7. Enter your Password Reset Question and Answer. This can be used at a later time if you forget your password. 8. Enter your e-mail address. You will receive e-mail notification when new information is available in [address] 9. Click Sign Up. You can now view and download portions of your medical record. 10. Click the Download Summary menu link to download a portable copy of your medical information. If you have questions, please visit the Frequently Asked Questions section of the Kids Quizine website. Remember, Kids Quizine is NOT to be used for urgent needs. For medical emergencies, dial 911. Now available from your iPhone and Android! Please provide this summary of care documentation to your next provider. Your primary care clinician is listed as Alma Cramer. If you have any questions after today's visit, please call [phone].

## 2018-08-21 ENCOUNTER — OFFICE VISIT (OUTPATIENT)
Dept: UROLOGY | Age: 68
End: 2018-08-21

## 2018-08-21 VITALS
BODY MASS INDEX: 28.04 KG/M2 | TEMPERATURE: 98.4 F | OXYGEN SATURATION: 94 % | WEIGHT: 207 LBS | HEART RATE: 80 BPM | SYSTOLIC BLOOD PRESSURE: 159 MMHG | HEIGHT: 72 IN | DIASTOLIC BLOOD PRESSURE: 89 MMHG

## 2018-08-21 DIAGNOSIS — N52.01 ERECTILE DYSFUNCTION DUE TO ARTERIAL INSUFFICIENCY: Primary | ICD-10-CM

## 2018-08-21 DIAGNOSIS — N40.1 BENIGN PROSTATIC HYPERPLASIA WITH INCOMPLETE BLADDER EMPTYING: ICD-10-CM

## 2018-08-21 DIAGNOSIS — R39.14 BENIGN PROSTATIC HYPERPLASIA WITH INCOMPLETE BLADDER EMPTYING: ICD-10-CM

## 2018-08-21 LAB
BILIRUB UR QL STRIP: NEGATIVE
GLUCOSE UR-MCNC: NEGATIVE MG/DL
KETONES P FAST UR STRIP-MCNC: NEGATIVE MG/DL
PH UR STRIP: 6 [PH] (ref 4.6–8)
PROT UR QL STRIP: NEGATIVE
SP GR UR STRIP: 1.02 (ref 1–1.03)
UA UROBILINOGEN AMB POC: NORMAL (ref 0.2–1)
URINALYSIS CLARITY POC: CLEAR
URINALYSIS COLOR POC: YELLOW
URINE BLOOD POC: NORMAL
URINE LEUKOCYTES POC: NEGATIVE
URINE NITRITES POC: NEGATIVE

## 2018-08-21 RX ORDER — TADALAFIL 20 MG/1
20 TABLET ORAL AS NEEDED
Qty: 20 TAB | Refills: 10 | Status: SHIPPED | OUTPATIENT
Start: 2018-08-21 | End: 2019-03-12

## 2018-08-21 RX ORDER — FINASTERIDE 5 MG/1
5 TABLET, FILM COATED ORAL DAILY
Qty: 90 TAB | Refills: 3 | Status: SHIPPED | OUTPATIENT
Start: 2018-08-21 | End: 2019-09-30 | Stop reason: SDUPTHER

## 2018-08-21 NOTE — MR AVS SNAPSHOT
615 [facility] [place] [address] [address] 
[phone] Patient: Kris Mathew MRN: [id number] GVL:[birth year] Visit Information Date & Time Provider Department Dept. Phone Encounter #  
 8/21/2018  9:30 AM Luis Daniel Penaloza, Allison Pierce [facility] [phone] [id number] Your Appointments 9/11/2018  8:30 AM  
Office Visit with Reyna Stephenson MD  
[facility] ([facility]) Appt Note:   
 148 [facility] 107 [address] [phone]  
  
   
 [address]  
  
    
 2/19/2019 10:00 AM  
Office Visit with Luis Daniel Penaloza MD  
[facility] Appt Note: checkup [address] [place] [address] [address]  
[phone] [address] 600 [facility] [address] Upcoming Health Maintenance Date Due DTaP/Tdap/Td series (1 - Tdap) 1/15/1970 ZOSTER VACCINE AGE 60> 11/15/2008 Pneumococcal 65+ Low/Medium Risk (1 of 2 - PCV13) 1/15/2014 MEDICARE YEARLY EXAM 3/14/2018 Influenza Age 5 to Adult 8/1/2018 COLONOSCOPY 4/18/2019 GLAUCOMA SCREENING Q2Y 11/16/2019 Allergies as of 8/21/2018  Review Complete On: 8/21/2018 By: Niki Saldaña No Known Allergies Current Immunizations  Never Reviewed No immunizations on file. Not reviewed this visit You Were Diagnosed With   
  
 Codes Comments Erectile dysfunction, unspecified erectile dysfunction type    -  Primary ICD-10-CM: N52.9 ICD-9-CM: 607.84 Benign prostatic hyperplasia with incomplete bladder emptying     ICD-10-CM: N40.1, R39.14 ICD-9-CM: 600.01, 788.21 Vitals BP Pulse Temp Height(growth percentile) Weight(growth percentile) SpO2  
 159/89 (BP 1 Location: Left arm, BP Patient Position: Sitting) 80 98.4 °F (36.9 °C) 6' (1.829 m) 207 lb (93.9 kg) 94% BMI Smoking Status 28.07 kg/m2 Former Smoker Vitals History BMI and BSA Data Body Mass Index Body Surface Area 28.07 kg/m 2 2.18 m 2 Preferred Pharmacy Pharmacy Name Phone 305 [facility], [address] [address] [place] [address] [address] Jessie Salcido Your Updated Medication List  
  
   
This list is accurate as of 8/21/18 10:56 AM.  Always use your most recent med list. amLODIPine 10 mg tablet Commonly known as:  [place] Take 1 Tab by mouth daily. atorvastatin 40 mg tablet Commonly known as:  LIPITOR Take 1 Tab by mouth daily. benazepril 40 mg tablet Commonly known as:  LOTENSIN Take 1 Tab by mouth daily. cetirizine 10 mg tablet Commonly known as:  ZYRTEC Take 1 Tab by mouth daily. diclofenac EC 75 mg EC tablet Commonly known as:  VOLTAREN Take 1 Tab by mouth two (2) times daily as needed. tadalafil 20 mg tablet Commonly known as:  CIALIS Take 1 Tab by mouth as needed. Indications: Erectile Dysfunction  
  
 tamsulosin 0.4 mg capsule Commonly known as:  FLOMAX Take 1 Cap by mouth daily. TRAVATAN Z 0.004 % ophthalmic solution Generic drug:  travoprost  
Administer 1 Drop to both eyes nightly. TYLENOL EXTRA STRENGTH 500 mg tablet Generic drug:  acetaminophen Take 1,000 mg by mouth every six (6) hours as needed for Pain. Prescriptions Printed Refills  
 tadalafil (CIALIS) 20 mg tablet 10 Sig: Take 1 Tab by mouth as needed. Indications: Erectile Dysfunction Class: Print Route: Oral  
  
We Performed the Following AMB POC URINALYSIS DIP STICK AUTO W/O MICRO [16570 CPT(R)] ID SINDI,POST-VOID RES,US,NON-IMAGING [id number] CPT(R)] Patient Instructions Erectile Dysfunction: Care Instructions Your Care Instructions A man has erectile dysfunction (ED) when he routinely can't get or keep an erection that allows satisfactory sex. He may not be able to have an erection at any time. Or he may not be able to have one that is firm enough or lasts long enough to complete intercourse. ED is not the same as having trouble getting an erection now and then. That's common. It happens to most men at some time. ED can be caused by problems with the blood vessels, nerves, or hormones. It can be caused by diabetes, heart disease, and injuries. Nerve disorders, such as multiple sclerosis or Parkinson's disease, can also cause it. ED can also be caused by medicines, alcohol, and tobacco. Or it may be caused by depression, stress, grief, or relationship problems. Follow-up care is a key part of your treatment and safety. Be sure to make and go to all appointments, and call your doctor if you are having problems. It's also a good idea to know your test results and keep a list of the medicines you take. How can you care for yourself at home? 
 Lifestyle 
  · Limit alcohol. Have no more than 2 drinks a day.  
  · Do not smoke. Smoking makes it harder for the blood vessels in the penis to relax and let blood flow in. If you need help quitting, talk to your doctor about stop-smoking programs and medicines. These can increase your chances of quitting for good.  
  · Do not use cocaine, heroin, or other illegal drugs.  
  · Try to reduce stress.  
  · Give yourself time to adjust to change. Changes in your job, family, relationships, home life, and other areas can cause stress. And stress can cause erection problems.  
 Work with your partner 
  · Don't assume that you know what your partner likes when it comes to sex. You may be wrong. Talk about what each of you does and does not enjoy.  
  · Make time outside of the bedroom to talk about your sex life. If you avoid sex because you are afraid of having erection problems, your partner may worry that you are no longer interested.   · If you and your partner have trouble talking about sex, see a therapist who can help you talk about it. Reading books with your partner about sexual health may also help.  
  · Relax. Take time for more foreplay. Worrying about your erections may only make things worse. Medicines 
  · Tell your doctor about all the medicines that you take. ¨ Some medicines can cause erection problems. ¨ Some medicines can have dangerous interactions with medicines that are prescribed for ED, including over-the-counter medicines and herbal products.  
  · Be safe with medicines. Take your medicines exactly as prescribed. Call your doctor if you think you are having a problem with your medicine.  
  · Talk to your doctor about trying a medicine to help you keep an erection. This could be a medicine such as Viagra, Levitra, or Cialis. If you have a heart problem, ask your doctor if these are safe for you. Do not take these medicines if you take nitroglycerin or other nitrate medicine. When should you call for help? Call your doctor now or seek immediate medical care if: 
  · You took a medicine for erectile dysfunction and you have an erection that lasts longer than 3 hours.  
 Watch closely for changes in your health, and be sure to contact your doctor if you have any problems. Where can you learn more? Go to http://vazquez-shraddha.info/. Enter [phone] in the search box to learn more about \"Erectile Dysfunction: Care Instructions. \" Current as of: December 3, 2017 Content Version: 11.7 © 2415-2594 [employer]. Care instructions adapted under license by [employer] (which disclaims liability or warranty for this information). If you have questions about a medical condition or this instruction, always ask your healthcare professional. [address] [address] any warranty or liability for your use of this information. Introducing [facility] & HEALTH SERVICES! [employer] introduces [employer] patient portal. Now you can access parts of your medical record, email your doctor's office, and request medication refills online. 1. In your internet browser, go to [email or web address] 2. Click on the First Time User? Click Here link in the Sign In box. You will see the New Member Sign Up page. 3. Enter your [employer] Access Code exactly as it appears below. You will not need to use this code after youve completed the sign-up process. If you do not sign up before the expiration date, you must request a new code. · [employer] Access Code: [email or web address] Expires: 10/22/2018  8:22 AM 
 
4. Enter the last four digits of your Social Security Number (xxxx) and Date of Birth (mm/dd/yyyy) as indicated and click Submit. You will be taken to the next sign-up page. 5. Create a [employer] ID. This will be your [employer] login ID and cannot be changed, so think of one that is secure and easy to remember. 6. Create a [employer] password. You can change your password at any time. 7. Enter your Password Reset Question and Answer. This can be used at a later time if you forget your password. 8. Enter your e-mail address. You will receive e-mail notification when new information is available in [address] 9. Click Sign Up. You can now view and download portions of your medical record. 10. Click the Download Summary menu link to download a portable copy of your medical information. If you have questions, please visit the Frequently Asked Questions section of the [employer] website. Remember, [employer] is NOT to be used for urgent needs. For medical emergencies, dial 911. Now available from your iPhone and Android! Please provide this summary of care documentation to your next provider. Your primary care clinician is listed as Alma Cramer. If you have any questions after today's visit, please call [phone].

## 2018-08-21 NOTE — PROGRESS NOTES
Chief Complaint   Patient presents with    New Patient    Erectile Dysfunction       HISTORY OF PRESENT ILLNESS:  Ilana Johnson is a 70 y.o. male who presents today with a couple of urologic issues. First of all he is having some problems with his urination and that he has nocturia times a couple of times at night with some slowing of his urinary stream.  He has been placed on Flomax in the past but it now is not working as well as it did several years ago. He has a slower stream and it seems like it is harder to get started. He also has problems with erections and that they are not of the quality they have been in the past.  His PSA in February of this year was 4.0. He does not have any dysuria urgency or frequency but he does have slow and hesitant voiding. Erections are not of the quality to have satisfactory intercourse. ROS documented on the chart he has a long-standing history of hypertension. Past Medical History:   Diagnosis Date    Arthritis     Hypertension     Other ill-defined conditions(051.)     awais       Past Surgical History:   Procedure Laterality Date    HX APPENDECTOMY  1961    HX OTHER SURGICAL Left 1990's    Exc. mass foot     HX OTHER SURGICAL Right 1980    release Dequervain's contracture    HX OTHER SURGICAL Left 2014    Removal large nevi behind ear       Social History   Substance Use Topics    Smoking status: Former Smoker     Quit date: 1/1/1980    Smokeless tobacco: Never Used    Alcohol use No       No Known Allergies    History reviewed. No pertinent family history. Current Outpatient Prescriptions   Medication Sig Dispense Refill    tadalafil (CIALIS) 20 mg tablet Take 1 Tab by mouth as needed. Indications: Erectile Dysfunction 20 Tab 10    finasteride (PROSCAR) 5 mg tablet Take 1 Tab by mouth daily. 90 Tab 3    diclofenac EC (VOLTAREN) 75 mg EC tablet Take 1 Tab by mouth two (2) times daily as needed.  180 Tab 0    benazepril (LOTENSIN) 40 mg tablet Take 1 Tab by mouth daily. 90 Tab 1    tamsulosin (FLOMAX) 0.4 mg capsule Take 1 Cap by mouth daily. 90 Cap 1    amLODIPine (NORVASC) 10 mg tablet Take 1 Tab by mouth daily. 90 Tab 1    travoprost (TRAVATAN Z) 0.004 % ophthalmic solution Administer 1 Drop to both eyes nightly.  cetirizine (ZYRTEC) 10 mg tablet Take 1 Tab by mouth daily. 90 Tab 1    atorvastatin (LIPITOR) 40 mg tablet Take 1 Tab by mouth daily. 90 Tab 1    acetaminophen (TYLENOL EXTRA STRENGTH) 500 mg tablet Take 1,000 mg by mouth every six (6) hours as needed for Pain. PHYSICAL EXAMINATION:   Visit Vitals    /89 (BP 1 Location: Left arm, BP Patient Position: Sitting)    Pulse 80    Temp 98.4 °F (36.9 °C)    Ht 6' (1.829 m)    Wt 207 lb (93.9 kg)    SpO2 94%    BMI 28.07 kg/m2     Constitutional: WDWN, Pleasant and appropriate affect, No acute distress. CV:  No peripheral swelling noted  Respiratory: No respiratory distress or difficulties  Abdomen:  No abdominal masses or tenderness. No CVA tenderness. No inguinal hernias noted.  Male:    DANIKA:Perineum normal to visual inspection, no erythema or irritation, Sphincter with good tone, Rectum with no hemorrhoids, fissures or masses, the prostate is about 50 g in size and generally is smooth and symmetrical I do not feel any nodules or indurated areas. SCROTUM:  No scrotal rash or lesions noticed. Normal bilateral testes and epididymis. PENIS: Urethral meatus normal in location and size. No urethral discharge. Skin: No jaundice. Neuro/Psych:  Alert and oriented x 3, affect appropriate. Lymphatic:   No enlarged inguinal lymph nodes.          Results for orders placed or performed in visit on 08/21/18   AMB POC URINALYSIS DIP STICK AUTO W/O MICRO   Result Value Ref Range    Color (UA POC) Yellow     Clarity (UA POC) Clear     Glucose (UA POC) Negative Negative    Bilirubin (UA POC) Negative Negative    Ketones (UA POC) Negative Negative Specific gravity (UA POC) 1.020 1.001 - 1.035    Blood (UA POC) Trace Negative    pH (UA POC) 6.0 4.6 - 8.0    Protein (UA POC) Negative Negative    Urobilinogen (UA POC) 0.2 mg/dL 0.2 - 1    Nitrites (UA POC) Negative Negative    Leukocyte esterase (UA POC) Negative Negative     A bladder scan today showed fewer than 50 cc of post void residual.    REVIEW OF LABS AND IMAGING:     Imaging Report Reviewed? Yes  Images Reviewed? Yes           Other Lab Data Reviewed? YES         ASSESSMENT:     ICD-10-CM ICD-9-CM    1. Erectile dysfunction due to arterial insufficiency N52.01 607.84 AMB POC URINALYSIS DIP STICK AUTO W/O MICRO   2. Benign prostatic hyperplasia with incomplete bladder emptying N40.1 600.01 PA SINDI,POST-VOID RES,US,NON-IMAGING    R39.14 788.21 finasteride (PROSCAR) 5 mg tablet            PLAN / DISCUSSION: : He and I had a lengthy talk about urinary issues and hips erectile dysfunction. In summary I think he does have mild BPH and he has probably simply reached the end of the ability of the Flomax to help urination. I am going to switch him over to finasteride 5 mg daily and I will see him back in about 4 months. I am also going to start him on some Cialis 20 mg to take twice weekly for a few weeks to see if that will improve his erections. The patient expresses understanding and agreement of the discussion and plan. Wes Peguero MD on 8/21/2018         Please note: This document has been produced using voice recognition software. Unrecognized errors in transcription may be present.

## 2018-08-21 NOTE — PROGRESS NOTES
Mr. Bernard Mckeon has a reminder for a \"due or due soon\" health maintenance. I have asked that he contact his primary care provider for follow-up on this health maintenance.

## 2018-08-21 NOTE — PATIENT INSTRUCTIONS
Erectile Dysfunction: Care Instructions  Your Care Instructions    A man has erectile dysfunction (ED) when he routinely can't get or keep an erection that allows satisfactory sex. He may not be able to have an erection at any time. Or he may not be able to have one that is firm enough or lasts long enough to complete intercourse. ED is not the same as having trouble getting an erection now and then. That's common. It happens to most men at some time. ED can be caused by problems with the blood vessels, nerves, or hormones. It can be caused by diabetes, heart disease, and injuries. Nerve disorders, such as multiple sclerosis or Parkinson's disease, can also cause it. ED can also be caused by medicines, alcohol, and tobacco. Or it may be caused by depression, stress, grief, or relationship problems. Follow-up care is a key part of your treatment and safety. Be sure to make and go to all appointments, and call your doctor if you are having problems. It's also a good idea to know your test results and keep a list of the medicines you take. How can you care for yourself at home?   Lifestyle    · Limit alcohol. Have no more than 2 drinks a day.     · Do not smoke. Smoking makes it harder for the blood vessels in the penis to relax and let blood flow in. If you need help quitting, talk to your doctor about stop-smoking programs and medicines. These can increase your chances of quitting for good.     · Do not use cocaine, heroin, or other illegal drugs.     · Try to reduce stress.     · Give yourself time to adjust to change. Changes in your job, family, relationships, home life, and other areas can cause stress. And stress can cause erection problems.    Work with your partner    · Don't assume that you know what your partner likes when it comes to sex. You may be wrong. Talk about what each of you does and does not enjoy.     · Make time outside of the bedroom to talk about your sex life.  If you avoid sex because you are afraid of having erection problems, your partner may worry that you are no longer interested.     · If you and your partner have trouble talking about sex, see a therapist who can help you talk about it. Reading books with your partner about sexual health may also help.     · Relax. Take time for more foreplay. Worrying about your erections may only make things worse. Medicines    · Tell your doctor about all the medicines that you take. ¨ Some medicines can cause erection problems. ¨ Some medicines can have dangerous interactions with medicines that are prescribed for ED, including over-the-counter medicines and herbal products.     · Be safe with medicines. Take your medicines exactly as prescribed. Call your doctor if you think you are having a problem with your medicine.     · Talk to your doctor about trying a medicine to help you keep an erection. This could be a medicine such as Viagra, Levitra, or Cialis. If you have a heart problem, ask your doctor if these are safe for you. Do not take these medicines if you take nitroglycerin or other nitrate medicine. When should you call for help? Call your doctor now or seek immediate medical care if:    · You took a medicine for erectile dysfunction and you have an erection that lasts longer than 3 hours.    Watch closely for changes in your health, and be sure to contact your doctor if you have any problems. Where can you learn more? Go to http://vazquez-shraddha.info/. Enter [id number] in the search box to learn more about \"Erectile Dysfunction: Care Instructions. \"  Current as of: December 3, 2017  Content Version: 11.7  © 1097-9244 [employer]wise, [employer]orporated. Care instructions adapted under license by [employer] (which disclaims liability or warranty for this information).  If you have questions about a medical condition or this instruction, always ask your healthcare professional. [address] any warranty or liability for your use of this information.

## 2018-09-11 ENCOUNTER — HOSPITAL ENCOUNTER (OUTPATIENT)
Dept: LAB | Age: 68
Discharge: HOME | End: 2018-09-11
Payer: MEDICARE

## 2018-09-11 ENCOUNTER — OFFICE VISIT (OUTPATIENT)
Dept: FAMILY MEDICINE CLINIC | Age: 68
End: 2018-09-11

## 2018-09-11 VITALS
RESPIRATION RATE: 18 BRPM | HEART RATE: 59 BPM | BODY MASS INDEX: 28.04 KG/M2 | HEIGHT: 72 IN | OXYGEN SATURATION: 96 % | WEIGHT: 207 LBS | TEMPERATURE: 96.9 F | SYSTOLIC BLOOD PRESSURE: 137 MMHG | DIASTOLIC BLOOD PRESSURE: 73 MMHG

## 2018-09-11 DIAGNOSIS — R94.31 ABNORMAL EKG: ICD-10-CM

## 2018-09-11 DIAGNOSIS — E78.5 DYSLIPIDEMIA: ICD-10-CM

## 2018-09-11 DIAGNOSIS — R07.9 CHEST PAIN, UNSPECIFIED TYPE: ICD-10-CM

## 2018-09-11 DIAGNOSIS — M54.42 CHRONIC MIDLINE LOW BACK PAIN WITH BILATERAL SCIATICA: ICD-10-CM

## 2018-09-11 DIAGNOSIS — M54.41 CHRONIC MIDLINE LOW BACK PAIN WITH BILATERAL SCIATICA: ICD-10-CM

## 2018-09-11 DIAGNOSIS — Z71.89 ACP (ADVANCE CARE PLANNING): ICD-10-CM

## 2018-09-11 DIAGNOSIS — I10 ESSENTIAL HYPERTENSION: ICD-10-CM

## 2018-09-11 DIAGNOSIS — H66.90 ACUTE OTITIS MEDIA, UNSPECIFIED OTITIS MEDIA TYPE: Primary | ICD-10-CM

## 2018-09-11 DIAGNOSIS — Z23 ENCOUNTER FOR IMMUNIZATION: ICD-10-CM

## 2018-09-11 DIAGNOSIS — Z00.00 ENCOUNTER FOR MEDICARE ANNUAL WELLNESS EXAM: ICD-10-CM

## 2018-09-11 DIAGNOSIS — G89.29 CHRONIC MIDLINE LOW BACK PAIN WITH BILATERAL SCIATICA: ICD-10-CM

## 2018-09-11 LAB
BASOPHILS # BLD: 0.1 K/UL (ref 0–0.1)
BASOPHILS NFR BLD: 2 % (ref 0–2)
CHOLEST SERPL-MCNC: 151 MG/DL
DIFFERENTIAL METHOD BLD: ABNORMAL
EOSINOPHIL # BLD: 0.1 K/UL (ref 0–0.4)
EOSINOPHIL NFR BLD: 3 % (ref 0–5)
ERYTHROCYTE [DISTWIDTH] IN BLOOD BY AUTOMATED COUNT: 12.3 % (ref 11.6–14.5)
HCT VFR BLD AUTO: 45.5 % (ref 36–48)
HDLC SERPL-MCNC: 65 MG/DL (ref 40–60)
HDLC SERPL: 2.3 {RATIO} (ref 0–5)
HGB BLD-MCNC: 14.8 G/DL (ref 13–16)
LDLC SERPL CALC-MCNC: 76.6 MG/DL (ref 0–100)
LIPID PROFILE,FLP: ABNORMAL
LYMPHOCYTES # BLD: 1.5 K/UL (ref 0.9–3.6)
LYMPHOCYTES NFR BLD: 30 % (ref 21–52)
MCH RBC QN AUTO: 28.3 PG (ref 24–34)
MCHC RBC AUTO-ENTMCNC: 32.5 G/DL (ref 31–37)
MCV RBC AUTO: 87 FL (ref 74–97)
MONOCYTES # BLD: 0.7 K/UL (ref 0.05–1.2)
MONOCYTES NFR BLD: 14 % (ref 3–10)
NEUTS SEG # BLD: 2.5 K/UL (ref 1.8–8)
NEUTS SEG NFR BLD: 51 % (ref 40–73)
PLATELET # BLD AUTO: 233 K/UL (ref 135–420)
PMV BLD AUTO: 11.1 FL (ref 9.2–11.8)
RBC # BLD AUTO: 5.23 M/UL (ref 4.7–5.5)
TRIGL SERPL-MCNC: 47 MG/DL (ref ?–150)
VLDLC SERPL CALC-MCNC: 9.4 MG/DL
WBC # BLD AUTO: 4.9 K/UL (ref 4.6–13.2)

## 2018-09-11 PROCEDURE — 80053 COMPREHEN METABOLIC PANEL: CPT | Performed by: FAMILY MEDICINE

## 2018-09-11 PROCEDURE — 85025 COMPLETE CBC W/AUTO DIFF WBC: CPT | Performed by: FAMILY MEDICINE

## 2018-09-11 PROCEDURE — 80061 LIPID PANEL: CPT | Performed by: FAMILY MEDICINE

## 2018-09-11 RX ORDER — AMOXICILLIN 500 MG/1
500 CAPSULE ORAL 3 TIMES DAILY
Qty: 30 CAP | Refills: 0 | Status: SHIPPED | OUTPATIENT
Start: 2018-09-11 | End: 2018-09-21

## 2018-09-11 RX ORDER — IBUPROFEN 800 MG/1
800 TABLET ORAL
Qty: 90 TAB | Refills: 1 | Status: SHIPPED | OUTPATIENT
Start: 2018-09-11 | End: 2019-02-14

## 2018-09-11 NOTE — MR AVS SNAPSHOT
[facility] [address] [facility] 
[phone] Patient: Shad Kramer MRN: [id number] ALVARO:[id number] Visit Information Date & Time Provider Department Dept. Phone Encounter #  
 9/11/2018  8:30 AM Alma Valdez MD [address] [facility] [phone] [id number] Follow-up Instructions Return in about 3 months (around 12/11/2018), or if symptoms worsen or fail to improve, for htn, back pain, dyslipidemia. Your Appointments 2/19/2019 10:00 AM  
Office Visit with Bhavna Ervin MD  
[facility] [address]) Appt Note: checkup [address] [address]  
[phone] [address] [place] [address] Upcoming Health Maintenance Date Due DTaP/Tdap/Td series (1 - Tdap) 1/15/1970 ZOSTER VACCINE AGE 60> 11/15/2008 Pneumococcal 65+ Low/Medium Risk (1 of 2 - PCV13) 1/15/2014 MEDICARE YEARLY EXAM 3/14/2018 Influenza Age 5 to Adult 8/1/2018 COLONOSCOPY 4/18/2019 GLAUCOMA SCREENING Q2Y 11/16/2019 Allergies as of 9/11/2018  Review Complete On: 9/11/2018 By: Jaylan Mehta MD  
 No Known Allergies Current Immunizations  Never Reviewed No immunizations on file. Not reviewed this visit You Were Diagnosed With   
  
 Codes Comments Acute otitis media, unspecified otitis media type    -  Primary ICD-10-CM: H66.90 ICD-9-CM: 382.9 Essential hypertension     ICD-10-CM: I10 
ICD-9-CM: 401.9 Chest pain, unspecified type     ICD-10-CM: R07.9 ICD-9-CM: 786.50 Gastroesophageal reflux disease, esophagitis presence not specified     ICD-10-CM: K21.9 ICD-9-CM: 530.81 Encounter for Medicare annual wellness exam     ICD-10-CM: Z00.00 ICD-9-CM: V70.0 ACP (advance care planning)     ICD-10-CM: Z71.89 ICD-9-CM: V65.49 Chronic midline low back pain with bilateral sciatica     ICD-10-CM: M54.41, M54.42, G89.29 ICD-9-CM: 724.2, 724.3, 338.29 Dyslipidemia     ICD-10-CM: E78.5 ICD-9-CM: 272.4 Vitals BP Pulse Temp Resp Height(growth percentile) Weight(growth percentile)  
 137/73 (BP 1 Location: Left arm, BP Patient Position: Sitting) (!) 59 96.9 °F (36.1 °C) (Oral) 18 6' (1.829 m) 207 lb (93.9 kg) SpO2 BMI Smoking Status 96% 28.07 kg/m2 Former Smoker BMI and BSA Data Body Mass Index Body Surface Area 28.07 kg/m 2 2.18 m 2 Preferred Pharmacy Pharmacy Name Phone [facility], [address] [place] [address] [address] Your Updated Medication List  
  
   
This list is accurate as of 9/11/18  9:42 AM.  Always use your most recent med list. amLODIPine 10 mg tablet Commonly known as:  [place] Take 1 Tab by mouth daily. amoxicillin 500 mg capsule Commonly known as:  AMOXIL Take 1 Cap by mouth three (3) times daily for 10 days. atorvastatin 40 mg tablet Commonly known as:  LIPITOR Take 1 Tab by mouth daily. benazepril 40 mg tablet Commonly known as:  LOTENSIN Take 1 Tab by mouth daily. cetirizine 10 mg tablet Commonly known as:  ZYRTEC Take 1 Tab by mouth daily. finasteride 5 mg tablet Commonly known as:  PROSCAR Take 1 Tab by mouth daily. ibuprofen 800 mg tablet Commonly known as:  MOTRIN Take 1 Tab by mouth every eight (8) hours as needed for Pain.  
  
 tadalafil 20 mg tablet Commonly known as:  CIALIS Take 1 Tab by mouth as needed. Indications: Erectile Dysfunction TRAVATAN Z 0.004 % ophthalmic solution Generic drug:  travoprost  
Administer 1 Drop to both eyes nightly. TYLENOL EXTRA STRENGTH 500 mg tablet Generic drug:  acetaminophen Take 1,000 mg by mouth every six (6) hours as needed for Pain. Prescriptions Sent to Pharmacy Refills  
 amoxicillin (AMOXIL) 500 mg capsule 0 Sig: Take 1 Cap by mouth three (3) times daily for 10 days. Class: Normal  
 Pharmacy: [address] Ph #: [phone] Route: Oral  
 ibuprofen (MOTRIN) 800 mg tablet 1 Sig: Take 1 Tab by mouth every eight (8) hours as needed for Pain. Class: Normal  
 [address] Ph #: [phone] Route: Oral  
  
We Performed the Following AMB POC EKG ROUTINE W/ 12 LEADS, INTER & REP [11382 CPT(R)] Follow-up Instructions Return in about 3 months (around 12/11/2018), or if symptoms worsen or fail to improve, for htn, back pain, dyslipidemia. To-Do List   
 09/11/2018 Lab:  CBC WITH AUTOMATED DIFF   
  
 09/11/2018 Lab:  LIPID PANEL   
  
 09/11/2018 Lab:  METABOLIC PANEL, COMPREHENSIVE   
  
 09/11/2018 Imaging:  XR SPINE LUMB 2 OR 3 V Patient Instructions Medicare Part B Preventive Services Limitations Recommendation Scheduled Bone Mass Measurement 
(age 72 & older, biennial) Requires diagnosis related to osteoporosis or estrogen deficiency. Biennial benefit unless patient has history of long-term glucocorticoid tx or baseline is needed because initial test was by other method Due    
Cardiovascular Screening Blood Tests (every 5 years) Total cholesterol, HDL, Triglycerides and ECG Order blood work  as a panel if possible and  for adults with routine risk  an electrocardiogram (ECG) at intervals determined by the provider. utd Colorectal Cancer Screening 
-Fecal occult blood test (annual) -Flexible sigmoidoscopy (5y) 
-Screening colonoscopy (10y) -Barium Enema Colorectal cancer screening should be done for adults age 54-65 with no increased risk factors for colorectal cancer. There are a number of acceptable methods of screening for this type of cancer.  Each test has its own benefits and drawbacks. Discuss with your provider what is most appropriate for you during your annual wellness visit. The different tests include: colonoscopy (considered the best screening method), a fecal occult blood test, a fecal DNA test, and sigmoidoscopy utd Counseling to Prevent Tobacco Use (up to 8 sessions per year) - Counseling greater than 3 and up to 10 minutes - Counseling greater than 10 minutes Patients must be asymptomatic of tobacco-related conditions to receive as preventive service N/a Diabetes Screening Tests (at least every 3 years, Medicare covers annually or at 6-month intervals for prediabetic patients) Fasting blood sugar (FBS) or glucose tolerance test (GTT) All adults age 38-68 who are overweight should have a diabetes screening test once every three years.  
-Other screening tests & preventive services for persons with diabetes include: an eye exam to screen for diabetic retinopathy, a kidney function test, a foot exam, and stricter control over your cholesterol. N/a Diabetes Self-Management Training (DSMT) (no USPSTF recommendation) Requires referral by treating physician for patient with diabetes or renal disease. 10 hours of initial DSMT session of no less than 30 minutes each in a continuous 12-month period. 2 hours of follow-up DSMT in subsequent years. N/a Glaucoma Screening (no USPSTF recommendation) Diabetes mellitus, family history, , age 48 or over,  American, age 72 or over Patient see Dr [facility] Human Immunodeficiency Virus (HIV) Screening (annually for increased risk patients) HIV-1 and HIV-2 by EIA, CASSI, rapid antibody test, or oral mucosa transudate Patient must be at increased risk for HIV infection per USPSTF guidelines or pregnant. Tests covered annually for patients at increased risk. Pregnant patients may receive up to 3 test during pregnancy.  N/a    
 Medical Nutrition Therapy (MNT) (for diabetes or renal disease not recommended schedule) Requires referral by treating physician for patient with diabetes or renal disease. Can be provided in same year as diabetes self-management training (DSMT), and CMS recommends medical nutrition therapy take place after DSMT. Up to 3 hours for initial year and 2 hours in subsequent years. N/a Prostate Cancer Screening (annually up to age 76) - Digital rectal exam (DANIKA) - Prostate specific antigen (PSA) Annually (age 48 or over), DANIKA not paid separately when covered E/M service is provided on same date Men up to age 76 may need a screening blood test for prostate cancer at certain intervals, depending on their personal and family history. This decision is between the patient and his provider. utd Seasonal Influenza Vaccination (annually) All adults should have a flu vaccine yearly  Due Pneumococcal Vaccination (once after 72) All adults  over age 72 should receive the recommended pneumonia vaccines. Current USPSTF guidelines recommend a series of two vaccines for the best pneumonia protection. Due    
Hepatitis B Vaccinations (if medium/high risk) Medium/high risk factors:  End-stage renal disease, Hemophiliacs who received Factor VIII or IX concentrates, Clients of institutions for the mentally retarded, Persons who live in the same house as a HepB virus carrier, Homosexual men, Illicit injectable drug abusers. N/a Shingles Vaccination A shingles vaccine is also recommended once in a lifetime after age 61 Due Ultrasound Screening for Abdominal Aortic Aneurysm (AAA) (once) An Abdominal Aortic Aneurysm (AAA) Screening is recommended for men age 73-68 who has ever smoked in their lifetime.   of the following criteria: 
- Men who are 73-68 years old and have smoked more than 100 cigarettes in their lifetime. 
-Anyone with a FH of AAA 
-Anyone recommended for screening by USPSTF N/a    
 
 Hep C All adults born between [birth year] and 1965 should be screened once for Hepatitis C utd Tetanus  All adults should have a tetanus vaccine every 10 years Due Introducing [facility]! Kobeeamon Pulliamjaspreet introduces [employer] patient portal. Now you can access parts of your medical record, email your doctor's office, and request medication refills online. 1. In your internet browser, go to [email or web address] 2. Click on the First Time User? Click Here link in the Sign In box. You will see the New Member Sign Up page. 3. Enter your [employer] Access Code exactly as it appears below. You will not need to use this code after youve completed the sign-up process. If you do not sign up before the expiration date, you must request a new code. · [employer] Access Code: [email or web address] Expires: 10/22/2018  8:22 AM 
 
4. Enter the last four digits of your Social Security Number (xxxx) and Date of Birth (mm/dd/yyyy) as indicated and click Submit. You will be taken to the next sign-up page. 5. Create a [employer] ID. This will be your [employer] login ID and cannot be changed, so think of one that is secure and easy to remember. 6. Create a [employer] password. You can change your password at any time. 7. Enter your Password Reset Question and Answer. This can be used at a later time if you forget your password. 8. Enter your e-mail address. You will receive e-mail notification when new information is available in [address] 9. Click Sign Up. You can now view and download portions of your medical record. 10. Click the Download Summary menu link to download a portable copy of your medical information. If you have questions, please visit the Frequently Asked Questions section of the [employer] website. Remember, [employer] is NOT to be used for urgent needs. For medical emergencies, dial 911. Now available from your iPhone and Android! Please provide this summary of care documentation to your next provider. Your primary care clinician is listed as Alma Cramer. If you have any questions after today's visit, please call [phone].

## 2018-09-11 NOTE — PROGRESS NOTES
Chief Complaint   Patient presents with   Almaz Benavidez Annual Wellness Visit    Ear Pain     right ear     Nasal Congestion     and chest/sinus     Chest Pain     discomfort not for sure if its gas      1. Have you been to the ER, urgent care clinic since your last visit? Hospitalized since your last visit? No    2. Have you seen or consulted any other health care providers outside of the [facility] since your last visit? Include any pap smears or colon screening. No        This is an Initial Medicare Annual Wellness Exam (AWV) (Performed 12 months after IPPE or effective date of Medicare Part B enrollment, Once in a lifetime)    I have reviewed the patient's medical history in detail and updated the computerized patient record. History   Sudheer Mcdonald comes in for [facility] wellness visit. Past Medical History:   Diagnosis Date    Arthritis     Hypertension     Other ill-defined conditions(089.89)     awais      Past Surgical History:   Procedure Laterality Date    HX APPENDECTOMY  1961    HX OTHER SURGICAL Left 1990's    Exc. mass foot     HX OTHER SURGICAL Right 1980    release Dequervain's contracture    HX OTHER SURGICAL Left 2014    Removal large nevi behind ear     Current Outpatient Prescriptions   Medication Sig Dispense Refill    tadalafil (CIALIS) 20 mg tablet Take 1 Tab by mouth as needed. Indications: Erectile Dysfunction 20 Tab 10    finasteride (PROSCAR) 5 mg tablet Take 1 Tab by mouth daily. 90 Tab 3    diclofenac EC (VOLTAREN) 75 mg EC tablet Take 1 Tab by mouth two (2) times daily as needed. 180 Tab 0    cetirizine (ZYRTEC) 10 mg tablet Take 1 Tab by mouth daily. 90 Tab 1    benazepril (LOTENSIN) 40 mg tablet Take 1 Tab by mouth daily. 90 Tab 1    atorvastatin (LIPITOR) 40 mg tablet Take 1 Tab by mouth daily. 90 Tab 1    amLODIPine (NORVASC) 10 mg tablet Take 1 Tab by mouth daily.  90 Tab 1    travoprost (TRAVATAN Z) 0.004 % ophthalmic solution Administer 1 Drop to both eyes nightly.  acetaminophen (TYLENOL EXTRA STRENGTH) 500 mg tablet Take 1,000 mg by mouth every six (6) hours as needed for Pain.  tamsulosin (FLOMAX) 0.4 mg capsule Take 1 Cap by mouth daily. 90 Cap 1     No Known Allergies  No family history on file. Social History   Substance Use Topics    Smoking status: Former Smoker     Quit date: 1/1/1980    Smokeless tobacco: Never Used    Alcohol use No     Patient Active Problem List   Diagnosis Code    History of colon polyps Z86.010    Family history of colon cancer Z80.0    Benign prostatic hyperplasia with incomplete bladder emptying N40.1, R39.14    Lower urinary tract symptoms (LUTS) R39.9    Essential hypertension I10    Polyp of colon K63.5    Dyslipidemia E78.5       Depression Risk Factor Screening:     PHQ over the last two weeks 9/11/2018   Little interest or pleasure in doing things Not at all   Feeling down, depressed, irritable, or hopeless Not at all   Total Score PHQ 2 0     Alcohol Risk Factor Screening: You do not drink alcohol or very rarely. Functional Ability and Level of Safety:   1. Was the patient's timed Up and GO test unsteady or longer than 30 seconds? No  2. Does the patient need help with the phone, transportation, shopping, preparing meals, housework, laundry, medications or managing money? No  3. Does the patients' home have rugs in the hallway, lack grab bars in the bathroom, lack handrails on the stairs or have poor lighting? No  4. Have you noticed any hearing difficulties? No  Hearing Evaluation:    Hearing Loss  Hearing is good. Activities of Daily Living  The home contains: no safety equipment. Patient does total self care    Fall Risk  Fall Risk Assessment, last 12 mths 9/11/2018   Able to walk? Yes   Fall in past 12 months?  No       Abuse Screen  Patient is not abused    Cognitive Screening   Evaluation of Cognitive Function:  Has your family/caregiver stated any concerns about your memory: no  Normal    Patient Care Team   Patient Care Team:  Bard Nageotte, MD as PCP - General (Family Practice)  Maureen Larry MD as Physician (Urology)    Assessment/Plan   Education and counseling provided:  Are appropriate based on today's review and evaluation  End-of-Life planning (with patient's consent)  Influenza Vaccine    1. Encounter for Medicare annual wellness exam    2. ACP (advance care planning)    3. Encounter for immunization  - Influenza Vaccine Inactivated (IIV)(FLUAD), Subunit, Adjuvanted, IM, (31107)  - Administration fee () for Medicare insured patients      Health Maintenance Due   Topic Date Due    DTaP/Tdap/Td series (1 - Tdap) 01/15/1970    ZOSTER VACCINE AGE 60>  11/15/2008    Pneumococcal 65+ Low/Medium Risk (1 of 2 - PCV13) 01/15/2014    MEDICARE YEARLY EXAM  03/14/2018    Influenza Age 9 to Adult  08/01/2018     I have discussed the diagnosis with the patient and the intended plan of care as seen in the above orders. The patient has received an after-visit summary and questions were answered concerning future plans. I have discussed medication, side effects, and warnings with the patient in detail. The patient verbalized understanding and is in agreement with the plan of care. The patient will contact the office with any additional concerns.     Bard Nageotte, MD

## 2018-09-11 NOTE — PROGRESS NOTES
hospitals  Louise Payne comes in for follow-up care. Hypertension: Patient is on Lotensin and Norvasc. Blood pressure is stable. We will continue with the current treatment plan. Chest pain: Patient has chest pain that is retrosternal.  This comes on and off. It is unrelated to exertion or activity. He denies diaphoresis or chest pressure. It does not radiate. No cough, wheeze or shortness of breath. No fever or chills. We did do an EKG that shows question of an anterior infarct that is undetermined age. Currently  he does not have any chest pain. I will refer him to the cardiologist for further evaluation given the abnormal EKG. The meantime he will go to the emergency room if he gets another episode of chest pain. Back pain: Patient has low back pain. This has been chronic for him. Now having numbness and tingling radiating down his lower extremities. These are sciatica symptoms. He denies trauma to the lower back. Has taken diclofenac but this did not help much. I will have him take ibuprofen 800 mg up to 3 times a day as needed for the pain. We will refer him for an x-ray of the lumbosacral spine and also place a referral for him to see the spine specialist given the chronicity of the pain and the sciatica-like symptoms. Ear pain: Patient has pain right ear. This has been ongoing for about a week. No fever or chills. No drainage from the ear. He does have malaise and fatigue. The tympanic membrane is inflamed and hyperemic. I will treat for otitis media. We will give him Amoxil to take 3 times a day for 10 days. Will follow-up in case of no improvement or worsening symptoms. He denies tinnitus or hearing loss. Dyslipidemia: We will check his lipid panel. Patient is on Lipitor 40 mg daily. Continue with this medication. Urology: Patient has a history of erectile dysfunction and L UTS. He has been followed up by the Urologist.  Currently he is on finasteride.     Past Medical History  Past Medical History:   Diagnosis Date    Arthritis     Hypertension     Other ill-defined conditions(799.89)     murmer       Surgical History  Past Surgical History:   Procedure Laterality Date    HX APPENDECTOMY  1961    HX OTHER SURGICAL Left 1990's    Exc. mass foot     HX OTHER SURGICAL Right 1980    release Dequervain's contracture    HX OTHER SURGICAL Left 2014    Removal large nevi behind ear        Medications  Current Outpatient Prescriptions   Medication Sig Dispense Refill    tadalafil (CIALIS) 20 mg tablet Take 1 Tab by mouth as needed. Indications: Erectile Dysfunction 20 Tab 10    finasteride (PROSCAR) 5 mg tablet Take 1 Tab by mouth daily. 90 Tab 3    diclofenac EC (VOLTAREN) 75 mg EC tablet Take 1 Tab by mouth two (2) times daily as needed. 180 Tab 0    cetirizine (ZYRTEC) 10 mg tablet Take 1 Tab by mouth daily. 90 Tab 1    benazepril (LOTENSIN) 40 mg tablet Take 1 Tab by mouth daily. 90 Tab 1    atorvastatin (LIPITOR) 40 mg tablet Take 1 Tab by mouth daily. 90 Tab 1    amLODIPine (NORVASC) 10 mg tablet Take 1 Tab by mouth daily. 90 Tab 1    travoprost (TRAVATAN Z) 0.004 % ophthalmic solution Administer 1 Drop to both eyes nightly.  acetaminophen (TYLENOL EXTRA STRENGTH) 500 mg tablet Take 1,000 mg by mouth every six (6) hours as needed for Pain.  tamsulosin (FLOMAX) 0.4 mg capsule Take 1 Cap by mouth daily. 90 Cap 1       Allergies  No Known Allergies    Family History  No family history on file. Social History  Social History     Social History    Marital status:      Spouse name: N/A    Number of children: N/A    Years of education: N/A     Occupational History    Not on file.      Social History Main Topics    Smoking status: Former Smoker     Quit date: 1/1/1980    Smokeless tobacco: Never Used    Alcohol use No    Drug use: No      Comment: H/O in the past Marijuana    Sexual activity: Not on file     Other Topics Concern    Not on file Social History Narrative       Review of Systems  Review of Systems - History obtained from chart review and the patient  General ROS: positive for  - fatigue and malaise  Psychological ROS: negative  Ophthalmic ROS: positive for - uses glasses  ENT ROS: negative  Allergy and Immunology ROS: negative  Hematological and Lymphatic ROS: negative  Endocrine ROS: negative  Respiratory ROS: no cough, shortness of breath, or wheezing  Cardiovascular ROS: positive for - chest pain  negative for - dyspnea on exertion, edema, irregular heartbeat or loss of consciousness  Gastrointestinal ROS: no abdominal pain, change in bowel habits, or black or bloody stools  Genito-Urinary ROS: ED, LUTS  Musculoskeletal ROS: positive for - joint pain, muscle pain and pain in back - lower  Neurological ROS: no TIA or stroke symptoms    Vital Signs  Visit Vitals    /73 (BP 1 Location: Left arm, BP Patient Position: Sitting)    Pulse (!) 59    Temp 96.9 °F (36.1 °C) (Oral)    Resp 18    Ht 6' (1.829 m)    Wt 207 lb (93.9 kg)    SpO2 96%    BMI 28.07 kg/m2         Physical Exam  Physical Examination: General appearance - oriented to person, place, and time, acyanotic, in no respiratory distress and well hydrated  Mental status - alert, oriented to person, place, and time, affect appropriate to mood  Mouth - mucous membranes moist, pharynx normal without lesions  Neck - supple, no significant adenopathy  Lymphatics - no palpable lymphadenopathy  Chest - clear to auscultation, no wheezes, rales or rhonchi, symmetric air entry  Heart - normal rate and regular rhythm, S1 and S2 normal  Abdomen - no rebound tenderness noted  Back exam - limited range of motion, pain with motion noted during exam, tenderness noted midline lumbar spine  Neurological - motor and sensory grossly normal bilaterally  Musculoskeletal - osteoarthritic changes noted in both hands  Extremities - no pedal edema noted, intact peripheral pulses    Results  Results for orders placed or performed in visit on 08/21/18   AMB POC URINALYSIS DIP STICK AUTO W/O MICRO   Result Value Ref Range    Color (UA POC) Yellow     Clarity (UA POC) Clear     Glucose (UA POC) Negative Negative    Bilirubin (UA POC) Negative Negative    Ketones (UA POC) Negative Negative    Specific gravity (UA POC) 1.020 1.001 - 1.035    Blood (UA POC) Trace Negative    pH (UA POC) 6.0 4.6 - 8.0    Protein (UA POC) Negative Negative    Urobilinogen (UA POC) 0.2 mg/dL 0.2 - 1    Nitrites (UA POC) Negative Negative    Leukocyte esterase (UA POC) Negative Negative       ASSESSMENT and PLAN    ICD-10-CM ICD-9-CM    1. Acute otitis media, unspecified otitis media type H66.90 382.9 amoxicillin (AMOXIL) 500 mg capsule   2. Essential hypertension I10 401.9 CBC WITH AUTOMATED DIFF      METABOLIC PANEL, COMPREHENSIVE      LIPID PANEL      COLLECTION VENOUS BLOOD,VENIPUNCTURE   3. Chest pain, unspecified type R07.9 786.50 AMB POC EKG ROUTINE W/ 12 LEADS, INTER & REP      REFERRAL TO CARDIOLOGY   4. Encounter for Medicare annual wellness exam Z00.00 V70.0    5. ACP (advance care planning) Z71.89 V65.49    6. Chronic midline low back pain with bilateral sciatica M54.41 724.2 ibuprofen (MOTRIN) 800 mg tablet    M54.42 724.3 XR SPINE LUMB 2 OR 3 V    G89.29 338.29    7. Dyslipidemia E78.5 272.4 LIPID PANEL      COLLECTION VENOUS BLOOD,VENIPUNCTURE   8. Encounter for immunization Z23 V03.89 INFLUENZA VACCINE INACTIVATED (IIV), SUBUNIT, ADJUVANTED, IM      ADMIN INFLUENZA VIRUS VAC   9.  Abnormal EKG R94.31 794.31 REFERRAL TO CARDIOLOGY     lab results and schedule of future lab studies reviewed with patient  reviewed diet, exercise and weight control  cardiovascular risk and specific lipid/LDL goals reviewed  reviewed medications and side effects in detail  radiology results and schedule of future radiology studies reviewed with patient    I have discussed the diagnosis with the patient and the intended plan of care as seen in the above orders. The patient has received an after-visit summary and questions were answered concerning future plans. I have discussed medication, side effects, and warnings with the patient in detail. The patient verbalized understanding and is in agreement with the plan of care. The patient will contact the office with any additional concerns.     Dayton Frank MD

## 2018-09-11 NOTE — PATIENT INSTRUCTIONS
Medicare Part B Preventive Services Limitations Recommendation Scheduled   Bone Mass Measurement  (age 72 & older, biennial) Requires diagnosis related to osteoporosis or estrogen deficiency. Biennial benefit unless patient has history of long-term glucocorticoid tx or baseline is needed because initial test was by other method Due     Cardiovascular Screening Blood Tests (every 5 years)  Total cholesterol, HDL, Triglycerides and ECG Order blood work  as a panel if possible and  for adults with routine risk  an electrocardiogram (ECG) at intervals determined by the provider. utd     Colorectal Cancer Screening  -Fecal occult blood test (annual)  -Flexible sigmoidoscopy (5y)  -Screening colonoscopy (10y)  -Barium Enema Colorectal cancer screening should be done for adults age 54-65 with no increased risk factors for colorectal cancer. There are a number of acceptable methods of screening for this type of cancer. Each test has its own benefits and drawbacks. Discuss with your provider what is most appropriate for you during your annual wellness visit.  The different tests include: colonoscopy (considered the best screening method), a fecal occult blood test, a fecal DNA test, and sigmoidoscopy utd     Counseling to Prevent Tobacco Use (up to 8 sessions per year)  - Counseling greater than 3 and up to 10 minutes  - Counseling greater than 10 minutes Patients must be asymptomatic of tobacco-related conditions to receive as preventive service N/a     Diabetes Screening Tests (at least every 3 years, Medicare covers annually or at 6-month intervals for prediabetic patients)    Fasting blood sugar (FBS) or glucose tolerance test (GTT) All adults age 38-68 who are overweight should have a diabetes screening test once every three years.   -Other screening tests & preventive services for persons with diabetes include: an eye exam to screen for diabetic retinopathy, a kidney function test, a foot exam, and stricter control over your cholesterol. N/a     Diabetes Self-Management Training (DSMT) (no USPSTF recommendation) Requires referral by treating physician for patient with diabetes or renal disease. 10 hours of initial DSMT session of no less than 30 minutes each in a continuous 12-month period. 2 hours of follow-up DSMT in subsequent years. N/a     Glaucoma Screening (no USPSTF recommendation) Diabetes mellitus, family history, , age 48 or over,  American, age 72 or over Patient see Dr Nicole Gallardo (HIV) Screening (annually for increased risk patients)  HIV-1 and HIV-2 by EIA, CASSI, rapid antibody test, or oral mucosa transudate Patient must be at increased risk for HIV infection per USPSTF guidelines or pregnant. Tests covered annually for patients at increased risk. Pregnant patients may receive up to 3 test during pregnancy. N/a     Medical Nutrition Therapy (MNT) (for diabetes or renal disease not recommended schedule) Requires referral by treating physician for patient with diabetes or renal disease. Can be provided in same year as diabetes self-management training (DSMT), and CMS recommends medical nutrition therapy take place after DSMT. Up to 3 hours for initial year and 2 hours in subsequent years. N/a     Prostate Cancer Screening (annually up to age 76)  - Digital rectal exam (DANIKA)  - Prostate specific antigen (PSA) Annually (age 48 or over), DANIKA not paid separately when covered E/M service is provided on same date  Men up to age 76 may need a screening blood test for prostate cancer at certain intervals, depending on their personal and family history. This decision is between the patient and his provider. utd    Seasonal Influenza Vaccination (annually) All adults should have a flu vaccine yearly  Due       Pneumococcal Vaccination (once after 72) All adults  over age 72 should receive the recommended pneumonia vaccines.  Current USPSTF guidelines recommend a series of two vaccines for the best pneumonia protection. Due     Hepatitis B Vaccinations (if medium/high risk) Medium/high risk factors:  End-stage renal disease,  Hemophiliacs who received Factor VIII or IX concentrates, Clients of institutions for the mentally retarded, Persons who live in the same house as a HepB virus carrier, Homosexual men, Illicit injectable drug abusers. N/a     Shingles Vaccination A shingles vaccine is also recommended once in a lifetime after age 61 Due     Ultrasound Screening for Abdominal Aortic Aneurysm (AAA) (once) An Abdominal Aortic Aneurysm (AAA) Screening is recommended for men age 73-68 who has ever smoked in their lifetime.   of the following criteria:  - Men who are 73-68 years old and have smoked more than 100 cigarettes in their lifetime.  -Anyone with a FH of AAA  -Anyone recommended for screening by USPSTF N/a       Hep C All adults born between [facility] should be screened once for Hepatitis C utd     Tetanus  All adults should have a tetanus vaccine every 10 years Due

## 2018-09-12 LAB
ALBUMIN SERPL-MCNC: 4.2 G/DL (ref 3.4–5)
ALBUMIN/GLOB SERPL: 1.2 {RATIO} (ref 0.8–1.7)
ALP SERPL-CCNC: 83 U/L (ref 45–117)
ALT SERPL-CCNC: 17 U/L (ref 16–61)
ANION GAP SERPL CALC-SCNC: 6 MMOL/L (ref 3–18)
AST SERPL-CCNC: 20 U/L (ref 15–37)
BILIRUB SERPL-MCNC: 0.7 MG/DL (ref 0.2–1)
BUN SERPL-MCNC: 21 MG/DL (ref 7–18)
BUN/CREAT SERPL: 20 (ref 12–20)
CALCIUM SERPL-MCNC: 8.9 MG/DL (ref 8.5–10.1)
CHLORIDE SERPL-SCNC: 106 MMOL/L (ref 100–108)
CO2 SERPL-SCNC: 30 MMOL/L (ref 21–32)
CREAT SERPL-MCNC: 1.06 MG/DL (ref 0.6–1.3)
GLOBULIN SER CALC-MCNC: 3.6 G/DL (ref 2–4)
GLUCOSE SERPL-MCNC: 92 MG/DL (ref 74–99)
POTASSIUM SERPL-SCNC: 4.4 MMOL/L (ref 3.5–5.5)
PROT SERPL-MCNC: 7.8 G/DL (ref 6.4–8.2)
SODIUM SERPL-SCNC: 142 MMOL/L (ref 136–145)

## 2018-09-12 NOTE — ACP (ADVANCE CARE PLANNING)
Advance Care Planning (ACP) Provider Note - Comprehensive     Date of ACP Conversation: 09/11/18  Persons included in Conversation:  patient  Length of ACP Conversation in minutes:  20 minutes    Authorized Decision Maker (if patient is incapable of making informed decisions): This person is:  Patient's wife. General ACP for ALL Patients with Decision Making Capacity:   Importance of advance care planning, including choosing a healthcare agent to communicate patient's healthcare decisions if patient lost the ability to make decisions, such as after a sudden illness or accident  Understanding of the healthcare agent role was assessed and information provided  Exploration of values, goals, and preferences if recovery is not expected, even with continued medical treatment in the event of: Imminent death  Severe, permanent brain injury  \"In these circumstances, what matters most to you? \"  Care focused more on comfort or quality of life.   Opportunity offered to explore how cultural, Congregation, spiritual, or personal beliefs would affect decisions for future care     Interventions Provided:  Recommended completion of Advance Directive form after review of ACP materials and conversation with prospective healthcare agent      Alma Spencer MD

## 2018-09-14 DIAGNOSIS — R39.9 LOWER URINARY TRACT SYMPTOMS (LUTS): ICD-10-CM

## 2018-09-14 DIAGNOSIS — E78.5 DYSLIPIDEMIA: ICD-10-CM

## 2018-09-14 DIAGNOSIS — I10 ESSENTIAL HYPERTENSION: ICD-10-CM

## 2018-09-17 RX ORDER — AMLODIPINE BESYLATE 10 MG/1
TABLET ORAL
Qty: 90 TAB | Refills: 0 | Status: SHIPPED | OUTPATIENT
Start: 2018-09-17 | End: 2018-12-27 | Stop reason: SDUPTHER

## 2018-09-17 RX ORDER — ATORVASTATIN CALCIUM 40 MG/1
TABLET, FILM COATED ORAL
Qty: 90 TAB | Refills: 0 | Status: SHIPPED | OUTPATIENT
Start: 2018-09-17 | End: 2018-12-27 | Stop reason: SDUPTHER

## 2018-09-17 RX ORDER — TAMSULOSIN HYDROCHLORIDE 0.4 MG/1
CAPSULE ORAL
Qty: 90 CAP | Refills: 0 | Status: SHIPPED | OUTPATIENT
Start: 2018-09-17 | End: 2018-12-11

## 2018-10-10 ENCOUNTER — OFFICE VISIT (OUTPATIENT)
Dept: CARDIOLOGY CLINIC | Age: 68
End: 2018-10-10

## 2018-10-10 VITALS
HEART RATE: 66 BPM | SYSTOLIC BLOOD PRESSURE: 142 MMHG | WEIGHT: 206 LBS | BODY MASS INDEX: 27.9 KG/M2 | DIASTOLIC BLOOD PRESSURE: 73 MMHG | HEIGHT: 72 IN

## 2018-10-10 DIAGNOSIS — R07.9 CHEST PAIN, UNSPECIFIED TYPE: Primary | ICD-10-CM

## 2018-10-10 DIAGNOSIS — R94.31 ABNORMAL EKG: ICD-10-CM

## 2018-10-10 DIAGNOSIS — I10 ESSENTIAL HYPERTENSION: ICD-10-CM

## 2018-10-10 DIAGNOSIS — E78.5 DYSLIPIDEMIA: ICD-10-CM

## 2018-10-10 NOTE — MR AVS SNAPSHOT
[address] [facility] [address] 
 
 
 [address] [facility] [address] 
[phone] Patient: Jeni Chandra MRN: [id number] Visit Information Date & Time Provider Department Dept. Phone Encounter #  
 10/10/2018  1:45 PM Arminda White MD [facility] [phone] [id number] Follow-up Instructions Return in about 3 weeks (around 10/31/2018). Your Appointments 10/15/2018  7:00 AM  
PROCEDURE with CA NUC [facility] ([address] [place] [address]) Appt Note: Jeri/Echo/Irving [address] [facility] [address]  
  
   
 [address] [address] [address] [address]  
  
    
 10/15/2018  7:30 AM  
PROCEDURE with CA ECHO [facility] ([address] [place] [address]) Appt Note: Echo/Jeri/Irving [address] [facility] [address]  
  
   
 [address] [address] [address] [address]  
  
    
 10/31/2018  1:30 PM  
Office Visit with Arminda White MD  
[facility] ([address] [place] [address]) Appt Note: Post nuclear/echo follow up  
 [address] [facility] [address]  
  
   
 [address] [address] [address] [address]  
  
    
 12/11/2018  8:30 AM  
ROUTINE CARE with Alma Nichole MD  
[facility] ([address] [place] [address]) Appt Note: htn, back pain [address] [facility] [address]  
[phone]  
  
   
 [address]  
  
    
 2/19/2019 10:00 AM  
Office Visit with Zofia Hathaway MD  
[facility] [address] [place] [address]) Appt Note: checkup [address] [address]  
[phone] [address] [address]  
  
    
 9/11/2019  2:00 PM  
Office Visit with Berlin Tyson MD  
[facility] ([address] [place], [place] [address]) Appt Note:  [facility]  
 [address] [facility] 107 [address] [address] [address] [phone]  
  
   
 [address] [facility] Upcoming Health Maintenance Date Due DTaP/Tdap/Td series (1 - Tdap) 1/15/1970 Shingrix Vaccine Age 50> (1 of 2) 1/15/1999 Pneumococcal 65+ Low/Medium Risk (1 of 2 - PCV13) 1/15/2014 COLONOSCOPY 4/18/2019 MEDICARE YEARLY EXAM 9/12/2019 GLAUCOMA SCREENING Q2Y 11/16/2019 Allergies as of 10/10/2018  Review Complete On: 10/10/2018 By: Nya Hatfield No Known Allergies Current Immunizations  Never Reviewed Name Date Influenza Vaccine (Tri) Adjuvanted 9/11/2018 10:05 AM  
  
 Not reviewed this visit You Were Diagnosed With   
  
 Codes Comments Chest pain, unspecified type    -  Primary ICD-10-CM: R07.9 ICD-9-CM: 786.50 occasional atypical chest pain Dyslipidemia     ICD-10-CM: E78.5 ICD-9-CM: 272.4 Essential hypertension     ICD-10-CM: I10 
ICD-9-CM: 401.9 Abnormal EKG     ICD-10-CM: R94.31 
ICD-9-CM: 794.31 Vitals BP Pulse Height(growth percentile) Weight(growth percentile) BMI Smoking Status 142/73 66 6' (1.829 m) 206 lb (93.4 kg) 27.94 kg/m2 Former Smoker Vitals History BMI and BSA Data Body Mass Index Body Surface Area  
 27.94 kg/m 2 2.18 m 2 Preferred Pharmacy Pharmacy Name Phone [address] [facility], [address] [place] [address] [address] [facility] Your Updated Medication List  
  
   
This list is accurate as of 10/10/18  2:30 PM.  Always use your most recent med list. amLODIPine 10 mg tablet Commonly known as:  Jeni Greening TAKE 1 TABLET BY MOUTH  DAILY  
  
 atorvastatin 40 mg tablet Commonly known as:  LIPITOR  
TAKE 1 TABLET BY MOUTH  DAILY  
  
 benazepril 40 mg tablet Commonly known as:  LOTENSIN Take 1 Tab by mouth daily. cetirizine 10 mg tablet Commonly known as:  ZYRTEC Take 1 Tab by mouth daily. finasteride 5 mg tablet Commonly known as:  PROSCAR  
 Take 1 Tab by mouth daily. ibuprofen 800 mg tablet Commonly known as:  MOTRIN Take 1 Tab by mouth every eight (8) hours as needed for Pain.  
  
 tadalafil 20 mg tablet Commonly known as:  CIALIS Take 1 Tab by mouth as needed. Indications: Erectile Dysfunction  
  
 tamsulosin 0.4 mg capsule Commonly known as:  FLOMAX TAKE 1 CAPSULE BY MOUTH  DAILY  
  
 TRAVATAN Z 0.004 % ophthalmic solution Generic drug:  travoprost  
Administer 1 Drop to both eyes nightly. TYLENOL EXTRA STRENGTH 500 mg tablet Generic drug:  acetaminophen Take 1,000 mg by mouth every six (6) hours as needed for Pain. Follow-up Instructions Return in about 3 weeks (around 10/31/2018). To-Do List   
 10/24/2018 NM Stress:  NUCLEAR CARDIAC STRESS TEST   
  
 10/31/2018 Echocardiography:  ECHO ADULT COMPLETE Patient Instructions Chest Pain: Care Instructions Your Care Instructions There are many things that can cause chest pain. Some are not serious and will get better on their own in a few days. But some kinds of chest pain need more testing and treatment. Your doctor may have recommended a follow-up visit in the next 8 to 12 hours. If you are not getting better, you may need more tests or treatment. Even though your doctor has released you, you still need to watch for any problems. The doctor carefully checked you, but sometimes problems can develop later. If you have new symptoms or if your symptoms do not get better, get medical care right away. If you have worse or different chest pain or pressure that lasts more than 5 minutes or you passed out (lost consciousness), call 911 or seek other emergency help right away. A medical visit is only one step in your treatment.  Even if you feel better, you still need to do what your doctor recommends, such as going to all suggested follow-up appointments and taking medicines exactly as directed. This will help you recover and help prevent future problems. How can you care for yourself at home? · Rest until you feel better. · Take your medicine exactly as prescribed. Call your doctor if you think you are having a problem with your medicine. · Do not drive after taking a prescription pain medicine. When should you call for help? Call 911 if: 
  · You passed out (lost consciousness).  
  · You have severe difficulty breathing.  
  · You have symptoms of a heart attack. These may include: ¨ Chest pain or pressure, or a strange feeling in your chest. 
¨ Sweating. ¨ Shortness of breath. ¨ Nausea or vomiting. ¨ Pain, pressure, or a strange feeling in your back, neck, jaw, or upper belly or in one or both shoulders or arms. ¨ Lightheadedness or sudden weakness. ¨ A fast or irregular heartbeat. After you call 911, the  may tell you to chew 1 adult-strength or 2 to 4 low-dose aspirin. Wait for an ambulance. Do not try to drive yourself.  
 Call your doctor today if: 
  · You have any trouble breathing.  
  · Your chest pain gets worse.  
  · You are dizzy or lightheaded, or you feel like you may faint.  
  · You are not getting better as expected.  
  · You are having new or different chest pain. Where can you learn more? Go to http://vazquez-shraddha.info/. Enter A120 in the search box to learn more about \"Chest Pain: Care Instructions. \" Current as of: November 20, 2017 Content Version: 11.8 © 5414-2019 [employer]. Care instructions adapted under license by [employer] (which disclaims liability or warranty for this information). If you have questions about a medical condition or this instruction, always ask your healthcare professional. [address] any warranty or liability for your use of this information. Introducing [facility] & HEALTH SERVICES!    
 formerly [facility] [employer] introduces [employer] patient portal. Now you can access parts of your medical record, email your doctor's office, and request medication refills online. 1. In your internet browser, go to [email or web address] 2. Click on the First Time User? Click Here link in the Sign In box. You will see the New Member Sign Up page. 3. Enter your [employer] Access Code exactly as it appears below. You will not need to use this code after youve completed the sign-up process. If you do not sign up before the expiration date, you must request a new code. · [employer] Access Code: [email or web address] Expires: 10/22/2018  8:22 AM 
 
4. Enter the last four digits of your Social Security Number (xxxx) and Date of Birth (mm/dd/yyyy) as indicated and click Submit. You will be taken to the next sign-up page. 5. Create a [employer] ID. This will be your [employer] login ID and cannot be changed, so think of one that is secure and easy to remember. 6. Create a [employer] password. You can change your password at any time. 7. Enter your Password Reset Question and Answer. This can be used at a later time if you forget your password. 8. Enter your e-mail address. You will receive e-mail notification when new information is available in [address] 9. Click Sign Up. You can now view and download portions of your medical record. 10. Click the Download Summary menu link to download a portable copy of your medical information. If you have questions, please visit the Frequently Asked Questions section of the [employer] website. Remember, [employer] is NOT to be used for urgent needs. For medical emergencies, dial 911. Now available from your iPhone and Android! Please provide this summary of care documentation to your next provider. Your primary care clinician is listed as Alma Cramer. If you have any questions after today's visit, please call [phone].

## 2018-10-10 NOTE — PATIENT INSTRUCTIONS
Chest Pain: Care Instructions  Your Care Instructions    There are many things that can cause chest pain. Some are not serious and will get better on their own in a few days. But some kinds of chest pain need more testing and treatment. Your doctor may have recommended a follow-up visit in the next 8 to 12 hours. If you are not getting better, you may need more tests or treatment. Even though your doctor has released you, you still need to watch for any problems. The doctor carefully checked you, but sometimes problems can develop later. If you have new symptoms or if your symptoms do not get better, get medical care right away. If you have worse or different chest pain or pressure that lasts more than 5 minutes or you passed out (lost consciousness), call 911 or seek other emergency help right away. A medical visit is only one step in your treatment. Even if you feel better, you still need to do what your doctor recommends, such as going to all suggested follow-up appointments and taking medicines exactly as directed. This will help you recover and help prevent future problems. How can you care for yourself at home? · Rest until you feel better. · Take your medicine exactly as prescribed. Call your doctor if you think you are having a problem with your medicine. · Do not drive after taking a prescription pain medicine. When should you call for help? Call 911 if:    · You passed out (lost consciousness).     · You have severe difficulty breathing.     · You have symptoms of a heart attack. These may include:  ¨ Chest pain or pressure, or a strange feeling in your chest.  ¨ Sweating. ¨ Shortness of breath. ¨ Nausea or vomiting. ¨ Pain, pressure, or a strange feeling in your back, neck, jaw, or upper belly or in one or both shoulders or arms. ¨ Lightheadedness or sudden weakness. ¨ A fast or irregular heartbeat.   After you call 911, the  may tell you to chew 1 adult-strength or 2 to 4 low-dose aspirin. Wait for an ambulance. Do not try to drive yourself.    Call your doctor today if:    · You have any trouble breathing.     · Your chest pain gets worse.     · You are dizzy or lightheaded, or you feel like you may faint.     · You are not getting better as expected.     · You are having new or different chest pain. Where can you learn more? Go to http://vazquez-shraddha.info/. Enter A120 in the search box to learn more about \"Chest Pain: Care Instructions. \"  Current as of: November 20, 2017  Content Version: 11.8  © 5332-4248 [employer]. Care instructions adapted under license by [employer] (which disclaims liability or warranty for this information). If you have questions about a medical condition or this instruction, always ask your healthcare professional. [address] any warranty or liability for your use of this information.

## 2018-10-15 NOTE — PROGRESS NOTES
HISTORY OF PRESENT ILLNESS  Jesica Alcantar is a 70 y.o. male. New Patient   The history is provided by the patient. This is a chronic problem. The problem occurs every several days. Associated symptoms include chest pain. Pertinent negatives include no abdominal pain, no headaches and no shortness of breath. Chest Pain    The history is provided by the patient. This is a chronic problem. The problem has not changed since onset. The problem occurs rarely. The pain is present in the left side. The pain is at a severity of 3/10. The quality of the pain is described as sharp. Associated symptoms include malaise/fatigue. Pertinent negatives include no abdominal pain, no claudication, no cough, no diaphoresis, no dizziness, no fever, no headaches, no hemoptysis, no nausea, no orthopnea, no palpitations, no PND, no shortness of breath, no sputum production, no vomiting and no weakness. Risk factors include hypertension. His past medical history is significant for HTN. Pertinent negatives include no cardiac catheterization, no echocardiogram and no stress thallium. Review of Systems   Constitutional: Positive for malaise/fatigue. Negative for chills, diaphoresis, fever and weight loss. HENT: Negative for congestion, ear discharge, ear pain, hearing loss, nosebleeds and tinnitus. Eyes: Negative for blurred vision. Respiratory: Negative for cough, hemoptysis, sputum production, shortness of breath, wheezing and stridor. Cardiovascular: Positive for chest pain. Negative for palpitations, orthopnea, claudication, leg swelling and PND. Gastrointestinal: Negative for abdominal pain, heartburn, nausea and vomiting. Musculoskeletal: Negative for myalgias and neck pain. Skin: Negative for itching and rash. Neurological: Negative for dizziness, tingling, tremors, focal weakness, loss of consciousness, weakness and headaches. Psychiatric/Behavioral: Negative for depression and suicidal ideas.      Family History   Problem Relation Age of Onset    No Known Problems Mother     No Known Problems Father        Past Medical History:   Diagnosis Date    Arthritis     Hypertension     Other ill-defined conditions(799.89)     murmer       Past Surgical History:   Procedure Laterality Date    HX APPENDECTOMY  1961    HX OTHER SURGICAL Left 1990's    Exc. mass foot     HX OTHER SURGICAL Right 1980    release Dequervain's contracture    HX OTHER SURGICAL Left 2014    Removal large nevi behind ear       Social History   Substance Use Topics    Smoking status: Former Smoker     Quit date: 1/1/1980    Smokeless tobacco: Never Used    Alcohol use No       No Known Allergies    Outpatient Prescriptions Marked as Taking for the 10/10/18 encounter (Office Visit) with Casandra Coats MD   Medication Sig Dispense Refill    amLODIPine (NORVASC) 10 mg tablet TAKE 1 TABLET BY MOUTH  DAILY 90 Tab 0    atorvastatin (LIPITOR) 40 mg tablet TAKE 1 TABLET BY MOUTH  DAILY 90 Tab 0    ibuprofen (MOTRIN) 800 mg tablet Take 1 Tab by mouth every eight (8) hours as needed for Pain. 90 Tab 1    tadalafil (CIALIS) 20 mg tablet Take 1 Tab by mouth as needed. Indications: Erectile Dysfunction 20 Tab 10    finasteride (PROSCAR) 5 mg tablet Take 1 Tab by mouth daily. 90 Tab 3    cetirizine (ZYRTEC) 10 mg tablet Take 1 Tab by mouth daily. 90 Tab 1    benazepril (LOTENSIN) 40 mg tablet Take 1 Tab by mouth daily. 90 Tab 1    travoprost (TRAVATAN Z) 0.004 % ophthalmic solution Administer 1 Drop to both eyes nightly.  acetaminophen (TYLENOL EXTRA STRENGTH) 500 mg tablet Take 1,000 mg by mouth every six (6) hours as needed for Pain. Visit Vitals    /73    Pulse 66    Ht 6' (1.829 m)    Wt 93.4 kg (206 lb)    BMI 27.94 kg/m2     Physical Exam   Constitutional: He is oriented to person, place, and time. He appears well-developed and well-nourished. No distress. HENT:   Head: Atraumatic.    Mouth/Throat: No oropharyngeal exudate. Eyes: Conjunctivae are normal. Right eye exhibits no discharge. Left eye exhibits no discharge. No scleral icterus. Neck: Normal range of motion. Neck supple. No JVD present. No tracheal deviation present. No thyromegaly present. Cardiovascular: Normal rate and regular rhythm. Exam reveals no gallop. No murmur heard. Pulmonary/Chest: Effort normal and breath sounds normal. No stridor. He has no wheezes. He has no rales. Abdominal: Soft. There is no tenderness. There is no rebound. Musculoskeletal: Normal range of motion. He exhibits no edema or tenderness. Lymphadenopathy:     He has no cervical adenopathy. Neurological: He is alert and oriented to person, place, and time. He exhibits normal muscle tone. Skin: Skin is warm. He is not diaphoretic. Psychiatric: He has a normal mood and affect. His behavior is normal.     ekg sinus rhythm with t wave inversion in anterior leads  ASSESSMENT and PLAN    ICD-10-CM ICD-9-CM    1. Chest pain, unspecified type R07.9 786.50     occasional atypical chest pain   2. Dyslipidemia E78.5 272.4    3. Essential hypertension I10 401.9    4. Abnormal EKG R94.31 794.31 NUCLEAR CARDIAC STRESS TEST      ECHO ADULT COMPLETE     No orders of the defined types were placed in this encounter. Follow-up Disposition:  Return in about 3 weeks (around 10/31/2018). current treatment plan is effective, no change in therapy  use of aspirin to prevent MI and TIA's discussed. Patient with risk factors seen for atypical chest pain.  ekg is abnormal with concerns for ischemia  Will obtain echo to assess LV hypertropy and EF  Stress test to evaluate ischemia  Meanwhile continue aspirin.   Will f/u in 3 weeks

## 2018-10-31 ENCOUNTER — OFFICE VISIT (OUTPATIENT)
Dept: CARDIOLOGY CLINIC | Age: 68
End: 2018-10-31

## 2018-10-31 VITALS
DIASTOLIC BLOOD PRESSURE: 78 MMHG | BODY MASS INDEX: 27.9 KG/M2 | WEIGHT: 206 LBS | SYSTOLIC BLOOD PRESSURE: 150 MMHG | HEIGHT: 72 IN | HEART RATE: 67 BPM

## 2018-10-31 DIAGNOSIS — E78.5 DYSLIPIDEMIA: Primary | ICD-10-CM

## 2018-10-31 DIAGNOSIS — I10 ESSENTIAL HYPERTENSION: ICD-10-CM

## 2018-10-31 DIAGNOSIS — R94.31 ABNORMAL EKG: ICD-10-CM

## 2018-10-31 DIAGNOSIS — R07.9 CHEST PAIN, UNSPECIFIED TYPE: ICD-10-CM

## 2018-10-31 DIAGNOSIS — I35.1 NONRHEUMATIC AORTIC VALVE INSUFFICIENCY: ICD-10-CM

## 2018-10-31 NOTE — PROGRESS NOTES
1. Have you been to the ER, urgent care clinic since your last visit? Hospitalized since your last visit?     no    2. Have you seen or consulted any other health care providers outside of the [address] [place] [address] since your last visit? Include any pap smears or colon screening. No     3. Since your last visit, have you had any of the following symptoms?  no

## 2018-10-31 NOTE — PROGRESS NOTES
HISTORY OF PRESENT ILLNESS  Edyta Bui is a 70 y.o. male. Hypertension   The history is provided by the patient. This is a chronic problem. The problem occurs every several days. The problem has not changed since onset. Chest Pain (Angina)    The history is provided by the patient. This is a chronic problem. The problem has not changed since onset. The problem occurs rarely. The pain is present in the left side. The pain is at a severity of 3/10. The quality of the pain is described as sharp. Associated symptoms include malaise/fatigue. Pertinent negatives include no claudication, no cough, no diaphoresis, no dizziness, no fever, no hemoptysis, no nausea, no orthopnea, no palpitations, no PND, no sputum production, no vomiting and no weakness. Risk factors include hypertension. His past medical history is significant for HTN. Pertinent negatives include no cardiac catheterization, no echocardiogram and no stress thallium. Review of Systems   Constitutional: Positive for malaise/fatigue. Negative for chills, diaphoresis, fever and weight loss. HENT: Negative for congestion, ear discharge, ear pain, hearing loss, nosebleeds and tinnitus. Eyes: Negative for blurred vision. Respiratory: Negative for cough, hemoptysis, sputum production, wheezing and stridor. Cardiovascular: Negative for palpitations, orthopnea, claudication, leg swelling and PND. Gastrointestinal: Negative for heartburn, nausea and vomiting. Musculoskeletal: Negative for myalgias and neck pain. Skin: Negative for itching and rash. Neurological: Negative for dizziness, tingling, tremors, focal weakness, loss of consciousness and weakness. Psychiatric/Behavioral: Negative for depression and suicidal ideas.      Family History   Problem Relation Age of Onset    No Known Problems Mother     No Known Problems Father        Past Medical History:   Diagnosis Date    Arthritis     Hypertension     Other ill-defined conditions(799.89)     murmer       Past Surgical History:   Procedure Laterality Date    HX APPENDECTOMY      HX OTHER SURGICAL Left     Exc. mass foot     HX OTHER SURGICAL Right     release Dequervain's contracture    HX OTHER SURGICAL Left     Removal large nevi behind ear       Social History     Tobacco Use    Smoking status: Former Smoker     Last attempt to quit: 1980     Years since quittin.8    Smokeless tobacco: Never Used   Substance Use Topics    Alcohol use: No       No Known Allergies    Outpatient Medications Marked as Taking for the 10/31/18 encounter (Office Visit) with Roxann Liang MD   Medication Sig Dispense Refill    amLODIPine (NORVASC) 10 mg tablet TAKE 1 TABLET BY MOUTH  DAILY 90 Tab 0    atorvastatin (LIPITOR) 40 mg tablet TAKE 1 TABLET BY MOUTH  DAILY 90 Tab 0    ibuprofen (MOTRIN) 800 mg tablet Take 1 Tab by mouth every eight (8) hours as needed for Pain. 90 Tab 1    tadalafil (CIALIS) 20 mg tablet Take 1 Tab by mouth as needed. Indications: Erectile Dysfunction 20 Tab 10    finasteride (PROSCAR) 5 mg tablet Take 1 Tab by mouth daily. 90 Tab 3    cetirizine (ZYRTEC) 10 mg tablet Take 1 Tab by mouth daily. 90 Tab 1    benazepril (LOTENSIN) 40 mg tablet Take 1 Tab by mouth daily. 90 Tab 1    travoprost (TRAVATAN Z) 0.004 % ophthalmic solution Administer 1 Drop to both eyes nightly.  acetaminophen (TYLENOL EXTRA STRENGTH) 500 mg tablet Take 1,000 mg by mouth every six (6) hours as needed for Pain. Visit Vitals  /78   Pulse 67   Ht 6' (1.829 m)   Wt 93.4 kg (206 lb)   BMI 27.94 kg/m²     Physical Exam   Constitutional: He is oriented to person, place, and time. He appears well-developed and well-nourished. No distress. HENT:   Head: Atraumatic. Mouth/Throat: No oropharyngeal exudate. Eyes: Conjunctivae are normal. Right eye exhibits no discharge. Left eye exhibits no discharge. No scleral icterus.    Neck: Normal range of motion. Neck supple. No JVD present. No tracheal deviation present. No thyromegaly present. Cardiovascular: Normal rate and regular rhythm. Exam reveals no gallop. No murmur heard. Pulmonary/Chest: Effort normal and breath sounds normal. No stridor. He has no wheezes. He has no rales. Abdominal: Soft. There is no tenderness. There is no rebound. Musculoskeletal: Normal range of motion. He exhibits no edema or tenderness. Lymphadenopathy:     He has no cervical adenopathy. Neurological: He is alert and oriented to person, place, and time. He exhibits normal muscle tone. Skin: Skin is warm. He is not diaphoretic. Psychiatric: He has a normal mood and affect. His behavior is normal.     ekg sinus rhythm with t wave inversion in anterior leads. 10/15/18   ECHO ADULT COMPLETE 10/19/2018 10/19/2018    Narrative · Normal left ventricular cavity size and systolic function (ejection   fraction normal). Moderate concentric hypertrophy observed. The estimated   ejection fraction is 61 - 65%. No regional wall motion abnormality noted. There is mild (grade 1) left ventricular diastolic dysfunction. · Normal right ventricular size and function. · The left atrial cavity size is mildly dilated. LA index is 35.03 ml/m2. · The right atiral cavity size is mildly dilated. · Echogenicity visualized on the right coronary cusp of the aortic valve,   cannot rule out healed vegetation vs. calcification. Moderate aortic valve   regurgitation with an eccentrically directed jet. · There is mild leaflet thickening of the mitral valve leaflets with mild   mitral annular calcification and mild regurgitation. · Mild tricuspid valve regurgitation. Pulmonary arterial systolic pressure   is 39 mmHg. · Mild pulmonic regurgitation. · The aortic root is mildly dilated. · Mild aortic valve sclerosis. Echogenicity visualized on the right   coronary cusp, cannot rule out calcification.  Moderate aortic valve regurgitation is present. · Pulmonary arterial systolic pressure is 67.8 mmHg. · Pulmonary arterial systolic pressure is 21.8 mmHg. Signed by: Daniel Sullivan MD     Lexiscan : no ischemia  ASSESSMENT and PLAN    ICD-10-CM ICD-9-CM    1. Dyslipidemia E78.5 272.4    2. Essential hypertension I10 401.9    3. Abnormal EKG R94.31 794.31    4. Chest pain, unspecified type R07.9 786.50    5. Nonrheumatic aortic valve insufficiency I35.1 424.1      No orders of the defined types were placed in this encounter. Follow-up Disposition:  Return in about 3 months (around 1/31/2019). current treatment plan is effective, no change in therapy  use of aspirin to prevent MI and TIA's discussed. Patient with risk factors seen for atypical chest pain.  ekg is abnormal with concerns for ischemia  Stress test report and echo report reviewed with patient and patient's spouse at length. No ischemia on stress test  Normal LV function with mild to moderate aortic regurgitation. No vegetation noted on the aortic valve. Recommend to continue aspirin. Chest pain is very atypical and infrequent  Effort tolerance is excellent. Discussed about further cardiac workup including coronary angiogram.  Patient wanted to wait until next appointment, will call us sooner if symptoms worsen.

## 2018-10-31 NOTE — PATIENT INSTRUCTIONS
High Blood Pressure: Care Instructions  Your Care Instructions    If your blood pressure is usually above 130/80, you have high blood pressure, or hypertension. That means the top number is 130 or higher or the bottom number is 80 or higher, or both. Despite what a lot of people think, high blood pressure usually doesn't cause headaches or make you feel dizzy or lightheaded. It usually has no symptoms. But it does increase your risk for heart attack, stroke, and kidney or eye damage. The higher your blood pressure, the more your risk increases. Your doctor will give you a goal for your blood pressure. Your goal will be based on your health and your age. Lifestyle changes, such as eating healthy and being active, are always important to help lower blood pressure. You might also take medicine to reach your blood pressure goal.  Follow-up care is a key part of your treatment and safety. Be sure to make and go to all appointments, and call your doctor if you are having problems. It's also a good idea to know your test results and keep a list of the medicines you take. How can you care for yourself at home? Medical treatment  · If you stop taking your medicine, your blood pressure will go back up. You may take one or more types of medicine to lower your blood pressure. Be safe with medicines. Take your medicine exactly as prescribed. Call your doctor if you think you are having a problem with your medicine. · Talk to your doctor before you start taking aspirin every day. Aspirin can help certain people lower their risk of a heart attack or stroke. But taking aspirin isn't right for everyone, because it can cause serious bleeding. · See your doctor regularly. You may need to see the doctor more often at first or until your blood pressure comes down. · If you are taking blood pressure medicine, talk to your doctor before you take decongestants or anti-inflammatory medicine, such as ibuprofen.  Some of these medicines can raise blood pressure. · Learn how to check your blood pressure at home. Lifestyle changes  · Stay at a healthy weight. This is especially important if you put on weight around the waist. Losing even 10 pounds can help you lower your blood pressure. · If your doctor recommends it, get more exercise. Walking is a good choice. Bit by bit, increase the amount you walk every day. Try for at least 30 minutes on most days of the week. You also may want to swim, bike, or do other activities. · Avoid or limit alcohol. Talk to your doctor about whether you can drink any alcohol. · Try to limit how much sodium you eat to less than 2,300 milligrams (mg) a day. Your doctor may ask you to try to eat less than 1,500 mg a day. · Eat plenty of fruits (such as bananas and oranges), vegetables, legumes, whole grains, and low-fat dairy products. · Lower the amount of saturated fat in your diet. Saturated fat is found in animal products such as milk, cheese, and meat. Limiting these foods may help you lose weight and also lower your risk for heart disease. · Do not smoke. Smoking increases your risk for heart attack and stroke. If you need help quitting, talk to your doctor about stop-smoking programs and medicines. These can increase your chances of quitting for good. When should you call for help? Call 911 anytime you think you may need emergency care. This may mean having symptoms that suggest that your blood pressure is causing a serious heart or blood vessel problem. Your blood pressure may be over 180/120.   For example, call 911 if:    · You have symptoms of a heart attack. These may include:  ? Chest pain or pressure, or a strange feeling in the chest.  ? Sweating. ? Shortness of breath. ? Nausea or vomiting. ? Pain, pressure, or a strange feeling in the back, neck, jaw, or upper belly or in one or both shoulders or arms. ? Lightheadedness or sudden weakness.   ? A fast or irregular heartbeat.     · You have symptoms of a stroke. These may include:  ? Sudden numbness, tingling, weakness, or loss of movement in your face, arm, or leg, especially on only one side of your body. ? Sudden vision changes. ? Sudden trouble speaking. ? Sudden confusion or trouble understanding simple statements. ? Sudden problems with walking or balance. ? A sudden, severe headache that is different from past headaches.     · You have severe back or belly pain.    Do not wait until your blood pressure comes down on its own. Get help right away.   Call your doctor now or seek immediate care if:    · Your blood pressure is much higher than normal (such as 180/120 or higher), but you don't have symptoms.     · You think high blood pressure is causing symptoms, such as:  ? Severe headache.  ? Blurry vision.    Watch closely for changes in your health, and be sure to contact your doctor if:    · Your blood pressure measures higher than your doctor recommends at least 2 times. That means the top number is higher or the bottom number is higher, or both.     · You think you may be having side effects from your blood pressure medicine. Where can you learn more? Go to http://vazquez-shraddha.info/. Enter J897 in the search box to learn more about \"High Blood Pressure: Care Instructions. \"  Current as of: December 6, 2017  Content Version: 11.8  © 3879-0679 [employer]. Care instructions adapted under license by [employer] (which disclaims liability or warranty for this information). If you have questions about a medical condition or this instruction, always ask your healthcare professional. [place] [address] any warranty or liability for your use of this information.

## 2018-11-08 DIAGNOSIS — G89.29 CHRONIC MIDLINE LOW BACK PAIN WITH BILATERAL SCIATICA: ICD-10-CM

## 2018-11-08 DIAGNOSIS — M54.42 CHRONIC MIDLINE LOW BACK PAIN WITH BILATERAL SCIATICA: ICD-10-CM

## 2018-11-08 DIAGNOSIS — M54.41 CHRONIC MIDLINE LOW BACK PAIN WITH BILATERAL SCIATICA: ICD-10-CM

## 2018-12-11 ENCOUNTER — OFFICE VISIT (OUTPATIENT)
Dept: FAMILY MEDICINE CLINIC | Age: 68
End: 2018-12-11

## 2018-12-11 VITALS
SYSTOLIC BLOOD PRESSURE: 150 MMHG | RESPIRATION RATE: 16 BRPM | BODY MASS INDEX: 27.77 KG/M2 | HEART RATE: 73 BPM | OXYGEN SATURATION: 97 % | TEMPERATURE: 97.1 F | HEIGHT: 72 IN | DIASTOLIC BLOOD PRESSURE: 77 MMHG | WEIGHT: 205 LBS

## 2018-12-11 DIAGNOSIS — Z23 ENCOUNTER FOR IMMUNIZATION: Primary | ICD-10-CM

## 2018-12-11 DIAGNOSIS — I38 VALVULAR HEART DISEASE: ICD-10-CM

## 2018-12-11 DIAGNOSIS — G62.9 NEUROPATHY: ICD-10-CM

## 2018-12-11 DIAGNOSIS — R39.9 LOWER URINARY TRACT SYMPTOMS (LUTS): ICD-10-CM

## 2018-12-11 DIAGNOSIS — M54.50 CHRONIC MIDLINE LOW BACK PAIN WITHOUT SCIATICA: ICD-10-CM

## 2018-12-11 DIAGNOSIS — G89.29 CHRONIC MIDLINE LOW BACK PAIN WITHOUT SCIATICA: ICD-10-CM

## 2018-12-11 DIAGNOSIS — I10 ESSENTIAL HYPERTENSION: ICD-10-CM

## 2018-12-11 DIAGNOSIS — E66.3 OVERWEIGHT (BMI 25.0-29.9): ICD-10-CM

## 2018-12-11 RX ORDER — GABAPENTIN 400 MG/1
CAPSULE ORAL
Qty: 90 CAP | Refills: 0 | Status: SHIPPED | OUTPATIENT
Start: 2018-12-11 | End: 2019-02-06 | Stop reason: SDUPTHER

## 2018-12-11 NOTE — PROGRESS NOTES
Chief Complaint   Patient presents with    Hypertension    Back Pain     worsening symptoms     1. Have you been to the ER, urgent care clinic since your last visit? Hospitalized since your last visit? No    2. Have you seen or consulted any other health care providers outside of the [address] [place] [address] since your last visit? Include any pap smears or colon screening. Yes, Cardiologist on September 2018. VASHTI Chandra comes in for follow-up care. Patient has low back pain. Has been chronic for him. Has been seen in the past by back specialist when he lived in [place]. He has used gabapentin in the past and this helps with his back pain and resultant numbness and tingling. He has been on ibuprofen and this does not seem to be helping lately. He would like to try the gabapentin. I will send in a prescription for this. He will take 400 mg once a day for a week and then twice a day and then increase to 3 times a day. He states that in the past he was on the 400 mg 3 times a day. Patient denies bladder or bowel dysfunction. Denies numbness or tingling down his lower extremities. Patient has hypertension. He takes amlodipine and benazepril. We will continue with the current treatment plan. He has lower urinary tract symptoms. Currently he is on finasteride. This helps with the symptoms. Will continue current treatment. Patient has a history of aortic valve insufficiency. He is followed up by the cardiologist.  Was seen last month. Has a follow-up appointment next year. Denies chest pain, diaphoresis, shortness of breath or easy fatigability. We will continue with the current management plan. Patient has a BMI of 27.80. This is overweight. Discussed normal BMI. He will do lifestyle and dietary modification. Patient does need to get his pneumococcal vaccine today. We will give this to him.       Past Medical History  Past Medical History:   Diagnosis Date    Aortic valve insufficiency     Arthritis     Hypertension     Other ill-defined conditions(799.89)     murmer       Surgical History  Past Surgical History:   Procedure Laterality Date    HX APPENDECTOMY  1961    HX OTHER SURGICAL Left 1990's    Exc. mass foot     HX OTHER SURGICAL Right 1980    release Dequervain's contracture    HX OTHER SURGICAL Left 2014    Removal large nevi behind ear        Medications  Current Outpatient Medications   Medication Sig Dispense Refill    gabapentin (NEURONTIN) 400 mg capsule Take 400 mg at bedtime for 1 week then 400 mg 2 x day for 1 week then 400 mg 3 x day. 90 Cap 0    amLODIPine (NORVASC) 10 mg tablet TAKE 1 TABLET BY MOUTH  DAILY 90 Tab 0    atorvastatin (LIPITOR) 40 mg tablet TAKE 1 TABLET BY MOUTH  DAILY 90 Tab 0    ibuprofen (MOTRIN) 800 mg tablet Take 1 Tab by mouth every eight (8) hours as needed for Pain. 90 Tab 1    tadalafil (CIALIS) 20 mg tablet Take 1 Tab by mouth as needed. Indications: Erectile Dysfunction 20 Tab 10    finasteride (PROSCAR) 5 mg tablet Take 1 Tab by mouth daily. 90 Tab 3    benazepril (LOTENSIN) 40 mg tablet Take 1 Tab by mouth daily. 90 Tab 1    travoprost (TRAVATAN Z) 0.004 % ophthalmic solution Administer 1 Drop to both eyes nightly.  acetaminophen (TYLENOL EXTRA STRENGTH) 500 mg tablet Take 1,000 mg by mouth every six (6) hours as needed for Pain.          Allergies  No Known Allergies    Family History  Family History   Problem Relation Age of Onset    No Known Problems Mother     No Known Problems Father        Social History  Social History     Socioeconomic History    Marital status:      Spouse name: Not on file    Number of children: Not on file    Years of education: Not on file    Highest education level: Not on file   Social Needs    Financial resource strain: Not on file    Food insecurity - worry: Not on file    Food insecurity - inability: Not on file   [employer] needs - medical: Not on file   [facility] Transportation needs - non-medical: Not on file   Occupational History    Not on file   Tobacco Use    Smoking status: Former Smoker     Last attempt to quit: 1980     Years since quittin.9    Smokeless tobacco: Never Used   Substance and Sexual Activity    Alcohol use: No    Drug use: No     Comment: H/O in the past Marijuana    Sexual activity: Not on file   Other Topics Concern    Not on file   Social History Narrative    Not on file       Review of Systems  Review of Systems - History obtained from chart review and the patient  General ROS: negative  Psychological ROS: negative  Ophthalmic ROS: positive for - uses glasses  Hematological and Lymphatic ROS: negative  Respiratory ROS: no cough, shortness of breath, or wheezing  Cardiovascular ROS: no chest pain or dyspnea on exertion  Gastrointestinal ROS: no abdominal pain, change in bowel habits, or black or bloody stools  Genito-Urinary ROS: LUTS  Musculoskeletal ROS: positive for -low back pain  Neurological ROS: positive for - numbness/tingling    Vital Signs  Visit Vitals  /77 (BP 1 Location: Right arm, BP Patient Position: Sitting)   Pulse 73   Temp 97.1 °F (36.2 °C) (Oral)   Resp 16   Ht 6' (1.829 m)   Wt 205 lb (93 kg)   SpO2 97%   BMI 27.80 kg/m²         Physical Exam  Physical Examination: General appearance - alert, well appearing, and in no distress, oriented to person, place, and time, overweight and acyanotic, in no respiratory distress  Mental status - alert, oriented to person, place, and time, normal mood, behavior, speech, dress, motor activity, and thought processes  Neck - supple, no significant adenopathy  Lymphatics - no palpable lymphadenopathy  Chest - clear to auscultation, no wheezes, rales or rhonchi, symmetric air entry  Heart - normal rate and regular rhythm, S1 and S2 normal  Back exam - limited range of motion, pain with motion noted during exam, tenderness noted paralumbar muscles  Neurological - motor and sensory grossly normal bilaterally  Musculoskeletal - osteoarthritic changes noted in both hands  Extremities - no pedal edema noted, intact peripheral pulses    Results  Results for orders placed or performed in visit on 10/15/18   ECHO ADULT COMPLETE   Result Value Ref Range    Right Atrial Area 4C 21.2 cm2    MV \"A\" wave duration 129.4 msec    Est. RA Pressure 3.0 mmHg    Tapse 2.28 (A) 1.5 - 2.0 cm    PASP 39.1 mmHg    AO ASC D 3.63 cm    Aortic Valve Systolic Peak Velocity 853.00 cm/s    AoV PG 11.7 mmHg    LVIDd 4.27 4.2 - 5.9 cm    LVPWd 1.37 (A) 0.6 - 1.0 cm    LVIDs 2.67 cm    IVSd 1.42 (A) 0.6 - 1.0 cm    LVOT Peak Velocity 161.87 cm/s    LVOT Peak Gradient 10.5 mmHg    LV E' Septal Velocity 6.99 cm/s    LV E' Lateral Velocity 7.73 cm/s    MVA (PHT) 3.0 cm2    MV A Stan 68.23 cm/s    MV E Stan 0.51 cm/s    MV E/A 0.01     RVIDd 4.03 cm    LA Volume 78.0 (A) 18 - 58 mL    LA Vol 4C 57.00 18 - 58 mL    LA Vol 2C 79.58 (A) 18 - 58 mL    LA Area 2C 24.22 cm2    LA Area 4C 20.6 cm2    LV Mass .4 (A) 88 - 224 g    LV Mass AL Index 126.3 g/m2    E/E' lateral 0.07     E/E' septal 0.07     RVSP 39.1 mmHg    E/E' ratio (averaged) 0.07     Mitral Valve E Wave Deceleration Time 255.9 ms    Mitral Valve Pressure Half-time 74.2 ms    Left Atrium Major Axis 3.36 cm    Triscuspid Valve Regurgitation Peak Gradient 36.1 mmHg    Aortic Regurgitant Pressure Half-time 469.0 cm    Pulmonic Valve Max Velocity 124.11 cm/s    TR Max Velocity 300.47 cm/s    LA Vol Index 36.16 ml/m2    LA Vol Index 36.89 ml/m2    LA Vol Index 26.42 ml/m2    AR Max Stan 483.46 cm/s    PV peak gradient 6.2 mmHg       ASSESSMENT and PLAN    ICD-10-CM ICD-9-CM    1. Chronic midline low back pain without sciatica M54.5 724.2 gabapentin (NEURONTIN) 400 mg capsule    G89.29 338.29    2. Overweight (BMI 25.0-29. 9) E66.3 278.02    3. Neuropathy G62.9 355.9 gabapentin (NEURONTIN) 400 mg capsule   4.  Encounter for immunization Z23 V03.89 PNEUMOCOCCAL POLYSACCHARIDE VACCINE, 23-VALENT, ADULT OR IMMUNOSUPPRESSED PT DOSE,   5. Lower urinary tract symptoms (LUTS) R39.9 788.99    6. Essential hypertension I10 401.9    7. Valvular heart disease I38 424.90      reviewed diet, exercise and weight control  reviewed medications and side effects in detail  Discussed the patient's BMI with him. The BMI follow up plan is as follows:     dietary management education, guidance, and counseling  encourage exercise  monitor weight  I have discussed the diagnosis with the patient and the intended plan of care as seen in the above orders. The patient has received an after-visit summary and questions were answered concerning future plans. I have discussed medication, side effects, and warnings with the patient in detail. The patient verbalized understanding and is in agreement with the plan of care. The patient will contact the office with any additional concerns.     Matthias Abarca MD

## 2018-12-11 NOTE — PATIENT INSTRUCTIONS
Body Mass Index: Care Instructions  Your Care Instructions    Body mass index (BMI) can help you see if your weight is raising your risk for health problems. It uses a formula to compare how much you weigh with how tall you are. · A BMI lower than 18.5 is considered underweight. · A BMI between 18.5 and 24.9 is considered healthy. · A BMI between 25 and 29.9 is considered overweight. A BMI of 30 or higher is considered obese. If your BMI is in the normal range, it means that you have a lower risk for weight-related health problems. If your BMI is in the overweight or obese range, you may be at increased risk for weight-related health problems, such as high blood pressure, heart disease, stroke, arthritis or joint pain, and diabetes. If your BMI is in the underweight range, you may be at increased risk for health problems such as fatigue, lower protection (immunity) against illness, muscle loss, bone loss, hair loss, and hormone problems. BMI is just one measure of your risk for weight-related health problems. You may be at higher risk for health problems if you are not active, you eat an unhealthy diet, or you drink too much alcohol or use tobacco products. Follow-up care is a key part of your treatment and safety. Be sure to make and go to all appointments, and call your doctor if you are having problems. It's also a good idea to know your test results and keep a list of the medicines you take. How can you care for yourself at home? · Practice healthy eating habits. This includes eating plenty of fruits, vegetables, whole grains, lean protein, and low-fat dairy. · If your doctor recommends it, get more exercise. Walking is a good choice. Bit by bit, increase the amount you walk every day. Try for at least 30 minutes on most days of the week. · Do not smoke. Smoking can increase your risk for health problems. If you need help quitting, talk to your doctor about stop-smoking programs and medicines. These can increase your chances of quitting for good. · Limit alcohol to 2 drinks a day for men and 1 drink a day for women. Too much alcohol can cause health problems. If you have a BMI higher than 25  · Your doctor may do other tests to check your risk for weight-related health problems. This may include measuring the distance around your waist. A waist measurement of more than 40 inches in men or 35 inches in women can increase the risk of weight-related health problems. · Talk with your doctor about steps you can take to stay healthy or improve your health. You may need to make lifestyle changes to lose weight and stay healthy, such as changing your diet and getting regular exercise. If you have a BMI lower than 18.5  · Your doctor may do other tests to check your risk for health problems. · Talk with your doctor about steps you can take to stay healthy or improve your health. You may need to make lifestyle changes to gain or maintain weight and stay healthy, such as getting more healthy foods in your diet and doing exercises to build muscle. Where can you learn more? Go to http://vazquez-shraddha.info/. Enter S176 in the search box to learn more about \"Body Mass Index: Care Instructions. \"  Current as of: October 13, 2016  Content Version: 11.4  © 1793-2918 [employer]. Care instructions adapted under license by [employer] (which disclaims liability or warranty for this information). If you have questions about a medical condition or this instruction, always ask your healthcare professional. [address] any warranty or liability for your use of this information.

## 2018-12-11 NOTE — PROGRESS NOTES
Patient was administered Pneumococcal 23 Vaccine via left deltoid without difficulty. Patient was informed of side effects and understanding verbalized. Patient waited and no side effects observed. No distress noted or voiced.

## 2018-12-27 DIAGNOSIS — E78.5 DYSLIPIDEMIA: ICD-10-CM

## 2018-12-27 DIAGNOSIS — I10 ESSENTIAL HYPERTENSION: ICD-10-CM

## 2018-12-27 RX ORDER — ATORVASTATIN CALCIUM 40 MG/1
TABLET, FILM COATED ORAL
Qty: 90 TAB | Refills: 0 | Status: SHIPPED | OUTPATIENT
Start: 2018-12-27 | End: 2019-03-12 | Stop reason: SDUPTHER

## 2018-12-27 RX ORDER — BENAZEPRIL HYDROCHLORIDE 40 MG/1
TABLET ORAL
Qty: 90 TAB | Refills: 1 | Status: SHIPPED | OUTPATIENT
Start: 2018-12-27 | End: 2019-03-12 | Stop reason: SDUPTHER

## 2018-12-27 RX ORDER — AMLODIPINE BESYLATE 10 MG/1
TABLET ORAL
Qty: 90 TAB | Refills: 0 | Status: SHIPPED | OUTPATIENT
Start: 2018-12-27 | End: 2019-03-12 | Stop reason: SDUPTHER

## 2019-02-06 ENCOUNTER — LAB ONLY (OUTPATIENT)
Dept: FAMILY MEDICINE CLINIC | Age: 69
End: 2019-02-06

## 2019-02-06 ENCOUNTER — HOSPITAL ENCOUNTER (OUTPATIENT)
Dept: LAB | Age: 69
Discharge: HOME | End: 2019-02-06
Payer: MEDICARE

## 2019-02-06 ENCOUNTER — OFFICE VISIT (OUTPATIENT)
Dept: CARDIOLOGY CLINIC | Age: 69
End: 2019-02-06

## 2019-02-06 VITALS
DIASTOLIC BLOOD PRESSURE: 82 MMHG | BODY MASS INDEX: 27.8 KG/M2 | HEART RATE: 71 BPM | SYSTOLIC BLOOD PRESSURE: 156 MMHG | HEIGHT: 72 IN

## 2019-02-06 DIAGNOSIS — M54.50 CHRONIC MIDLINE LOW BACK PAIN WITHOUT SCIATICA: ICD-10-CM

## 2019-02-06 DIAGNOSIS — I20.0 INTERMEDIATE CORONARY SYNDROME (HCC): ICD-10-CM

## 2019-02-06 DIAGNOSIS — G89.29 CHRONIC MIDLINE LOW BACK PAIN WITHOUT SCIATICA: ICD-10-CM

## 2019-02-06 DIAGNOSIS — Z01.89 ENCOUNTER FOR LABORATORY TEST: Primary | ICD-10-CM

## 2019-02-06 DIAGNOSIS — I10 ESSENTIAL HYPERTENSION: ICD-10-CM

## 2019-02-06 DIAGNOSIS — R94.31 ABNORMAL EKG: ICD-10-CM

## 2019-02-06 DIAGNOSIS — G62.9 NEUROPATHY: ICD-10-CM

## 2019-02-06 DIAGNOSIS — I35.1 NONRHEUMATIC AORTIC VALVE INSUFFICIENCY: ICD-10-CM

## 2019-02-06 DIAGNOSIS — E78.5 DYSLIPIDEMIA: Primary | ICD-10-CM

## 2019-02-06 PROCEDURE — 83735 ASSAY OF MAGNESIUM: CPT

## 2019-02-06 PROCEDURE — 84443 ASSAY THYROID STIM HORMONE: CPT

## 2019-02-06 PROCEDURE — 85027 COMPLETE CBC AUTOMATED: CPT

## 2019-02-06 PROCEDURE — 80053 COMPREHEN METABOLIC PANEL: CPT

## 2019-02-06 RX ORDER — METOPROLOL TARTRATE 25 MG/1
25 TABLET, FILM COATED ORAL 2 TIMES DAILY
Qty: 60 TAB | Refills: 0 | Status: SHIPPED | OUTPATIENT
Start: 2019-02-06 | End: 2019-02-08 | Stop reason: SDUPTHER

## 2019-02-06 NOTE — PATIENT INSTRUCTIONS
Instructions    Patients Name:  Amirah Sullivan    1. You are scheduled to have a on   at  Please check in at   . 2. Please go to [facility] and park in the outpatient parking lot that is located around to the back of the hospital and enter through the [facility] building. Once you enter through the [facility] check in with the  there. The  will either give you directions or assist you in getting to the cath holding area. 3. You are not to eat anything after midnight before the procedure. Please continue to drink fluids up until midnight.  (water, juice, black coffee, soft drinks). Do not drink milk or milk products or anything with cream. You may take medications(except for diabetes) with a small sip of water before 6am on the day of the procedure. .    4. If you are diabetic, do not take your insulin/sugar pill the morning of the procedure. 5. MEDICATION INSTRUCTIONS:   Please take your morning medications with the following special instructions:    [x]          Please make sure to take your Blood pressure medication with just enough water to swallow: .    []          Take your Aspirin and/or Plavix. []          Stop your Coumadin on   and do not resume it until after the procedure.     []          Take Prednisone 60 mg and Benadryl 25 mg by mouth at Bedtime on  and again on  at . This is to prevent you from having an allergic reaction to the dye. 6. We encourage families to wait in the waiting room on the first floor while the procedure is being done. The Doctor will come out and talk with you as soon as the procedure is over. 7. There is the possibility that you may spend the night in the hospital, depending on the results of the procedure. This will be determined after the procedure is done. If angioplasty or stent is planned, you will stay at least one day.     8. If you or your family have any questions, please call our office Monday -Friday, 9:00 a.m.-4:30 p.m.,  At [phone], and ask to speak to one of the nurses.

## 2019-02-06 NOTE — PROGRESS NOTES
Patient presents for lab draw ordered by:    Ordering Provider:  Dr. El Bird Department/Practice:  [facility]  Phone:  [phone]  Date Ordered: 2/6/2019    The following labs were drawn and sent to [facility] lab at [facility] by Laine Granados:    CBC, CMP, TSH, 3rd Generation, PT/INR and PTT, and Magnesium    The following tubes were sent:    Red ( 2) and Lavender  ( 1)  Gel (1)    Venipuncture performed to right arm successfully. Patient tolerated procedure.

## 2019-02-06 NOTE — PROGRESS NOTES
1. Have you been to the ER, urgent care clinic since your last visit? Hospitalized since your last visit? No    2. Have you seen or consulted any other health care providers outside of the [address] [place] [address] since your last visit? Include any pap smears or colon screening. Yes Where: pcp     3. Since your last visit, have you had any of the following symptoms? chest pains. 4.  Have you had any blood work, X-rays or cardiac testing? No        5. Where do you normally have your labs drawn?   [facility]    6. Do you need any refills today?    no

## 2019-02-06 NOTE — PROGRESS NOTES
HISTORY OF PRESENT ILLNESS  Destin Conroy is a 78 y.o. male. Hypertension   The history is provided by the patient. This is a chronic problem. The problem occurs every several days. The problem has not changed since onset. Associated symptoms include chest pain. Chest Pain (Angina)    The history is provided by the patient. This is a recurrent problem. The problem has been gradually worsening. The problem occurs daily. The pain is associated with exertion and normal activity. The pain is present in the left side. The pain is at a severity of 3/10. The quality of the pain is described as sharp. Associated symptoms include malaise/fatigue. Pertinent negatives include no claudication, no cough, no diaphoresis, no dizziness, no fever, no hemoptysis, no nausea, no orthopnea, no palpitations, no PND, no sputum production, no vomiting and no weakness. Risk factors include hypertension. His past medical history is significant for HTN. Pertinent negatives include no cardiac catheterization, no echocardiogram and no stress thallium. Review of Systems   Constitutional: Positive for malaise/fatigue. Negative for chills, diaphoresis, fever and weight loss. HENT: Negative for congestion, ear discharge, ear pain, hearing loss, nosebleeds and tinnitus. Eyes: Negative for blurred vision. Respiratory: Negative for cough, hemoptysis, sputum production, wheezing and stridor. Cardiovascular: Positive for chest pain. Negative for palpitations, orthopnea, claudication, leg swelling and PND. Gastrointestinal: Negative for heartburn, nausea and vomiting. Musculoskeletal: Negative for myalgias and neck pain. Skin: Negative for itching and rash. Neurological: Negative for dizziness, tingling, tremors, focal weakness, loss of consciousness and weakness. Psychiatric/Behavioral: Negative for depression and suicidal ideas.      Family History   Problem Relation Age of Onset    No Known Problems Mother     No Known Problems Father        Past Medical History:   Diagnosis Date    Aortic valve insufficiency     Arthritis     Hypertension     Other ill-defined conditions(799.89)     murmer       Past Surgical History:   Procedure Laterality Date    HX APPENDECTOMY      HX OTHER SURGICAL Left     Exc. mass foot     HX OTHER SURGICAL Right     release Dequervain's contracture    HX OTHER SURGICAL Left     Removal large nevi behind ear       Social History     Tobacco Use    Smoking status: Former Smoker     Last attempt to quit: 1980     Years since quittin.1    Smokeless tobacco: Never Used   Substance Use Topics    Alcohol use: No       No Known Allergies    Outpatient Medications Marked as Taking for the 19 encounter (Office Visit) with Franklin MD Osvaldo   Medication Sig Dispense Refill    metoprolol tartrate (LOPRESSOR) 25 mg tablet Take 1 Tab by mouth two (2) times a day. 60 Tab 0    benazepril (LOTENSIN) 40 mg tablet TAKE 1 TABLET BY MOUTH  DAILY 90 Tab 1    amLODIPine (NORVASC) 10 mg tablet TAKE 1 TABLET BY MOUTH  DAILY 90 Tab 0    atorvastatin (LIPITOR) 40 mg tablet TAKE 1 TABLET BY MOUTH  DAILY 90 Tab 0    gabapentin (NEURONTIN) 400 mg capsule Take 400 mg at bedtime for 1 week then 400 mg 2 x day for 1 week then 400 mg 3 x day. 90 Cap 0    tadalafil (CIALIS) 20 mg tablet Take 1 Tab by mouth as needed. Indications: Erectile Dysfunction 20 Tab 10    finasteride (PROSCAR) 5 mg tablet Take 1 Tab by mouth daily. 90 Tab 3    travoprost (TRAVATAN Z) 0.004 % ophthalmic solution Administer 1 Drop to both eyes nightly.  acetaminophen (TYLENOL EXTRA STRENGTH) 500 mg tablet Take 1,000 mg by mouth every six (6) hours as needed for Pain. Visit Vitals  /82   Pulse 71   Ht 6' (1.829 m)   BMI 27.80 kg/m²     Physical Exam   Constitutional: He is oriented to person, place, and time. He appears well-developed and well-nourished. No distress.    HENT:   Head: Atraumatic. Mouth/Throat: No oropharyngeal exudate. Eyes: Conjunctivae are normal. Right eye exhibits no discharge. Left eye exhibits no discharge. No scleral icterus. Neck: Normal range of motion. Neck supple. No JVD present. No tracheal deviation present. No thyromegaly present. Cardiovascular: Normal rate and regular rhythm. Exam reveals no gallop. No murmur heard. Pulmonary/Chest: Effort normal and breath sounds normal. No stridor. He has no wheezes. He has no rales. Abdominal: Soft. There is no tenderness. There is no rebound. Musculoskeletal: Normal range of motion. He exhibits no edema or tenderness. Lymphadenopathy:     He has no cervical adenopathy. Neurological: He is alert and oriented to person, place, and time. He exhibits normal muscle tone. Skin: Skin is warm. He is not diaphoretic. Psychiatric: He has a normal mood and affect. His behavior is normal.     ekg sinus rhythm with t wave inversion in anterior leads. 10/15/18   ECHO ADULT COMPLETE 10/19/2018 10/19/2018    Narrative · Normal left ventricular cavity size and systolic function (ejection   fraction normal). Moderate concentric hypertrophy observed. The estimated   ejection fraction is 61 - 65%. No regional wall motion abnormality noted. There is mild (grade 1) left ventricular diastolic dysfunction. · Normal right ventricular size and function. · The left atrial cavity size is mildly dilated. LA index is 35.03 ml/m2. · The right atiral cavity size is mildly dilated. · Echogenicity visualized on the right coronary cusp of the aortic valve,   cannot rule out healed vegetation vs. calcification. Moderate aortic valve   regurgitation with an eccentrically directed jet. · There is mild leaflet thickening of the mitral valve leaflets with mild   mitral annular calcification and mild regurgitation. · Mild tricuspid valve regurgitation. Pulmonary arterial systolic pressure   is 39 mmHg.   · Mild pulmonic regurgitation. · The aortic root is mildly dilated. · Mild aortic valve sclerosis. Echogenicity visualized on the right   coronary cusp, cannot rule out calcification. Moderate aortic valve   regurgitation is present. · Pulmonary arterial systolic pressure is 85.2 mmHg. · Pulmonary arterial systolic pressure is 70.7 mmHg. Signed by: Ryan Greene MD     Lexiscan : no ischemia  ASSESSMENT and PLAN    ICD-10-CM ICD-9-CM    1. Dyslipidemia E78.5 272.4    2. Essential hypertension I10 401.9    3. Abnormal EKG R94.31 794.31    4. Nonrheumatic aortic valve insufficiency I35.1 424.1    5. Intermediate coronary syndrome (HCC) J09.3 885.6 METABOLIC PANEL, COMPREHENSIVE      MAGNESIUM      PROTHROMBIN TIME + INR      PTT      TSH 3RD GENERATION      CBC W/O DIFF      CASE REQUEST CATH LAB    CCS 3     Orders Placed This Encounter    METABOLIC PANEL, COMPREHENSIVE     Standing Status:   Future     Standing Expiration Date:   2/7/2020    MAGNESIUM     Standing Status:   Future     Standing Expiration Date:   2/7/2020    PROTHROMBIN TIME + INR     Standing Status:   Future     Standing Expiration Date:   2/7/2020    PTT     Standing Status:   Future     Standing Expiration Date:   2/7/2020    TSH 3RD GENERATION     Standing Status:   Future     Standing Expiration Date:   2/7/2020    CBC W/O DIFF     Standing Status:   Future     Standing Expiration Date:   2/7/2020    metoprolol tartrate (LOPRESSOR) 25 mg tablet     Sig: Take 1 Tab by mouth two (2) times a day. Dispense:  60 Tab     Refill:  0     Follow-up Disposition: Not on File  current treatment plan is effective, no change in therapy  use of aspirin to prevent MI and TIA's discussed. Patient with risk factors seen for atypical chest pain.  ekg is abnormal with concerns for ischemia  Stress test report and echo report reviewed with patient and patient's spouse at length.   No ischemia on stress test  Normal LV function with mild to moderate aortic regurgitation. No vegetation noted on the aortic valve. Recommend to continue aspirin. For follow-up today. Complains of worsening chest pain on and off for the last week. Given abnormal EKG and risk factors we will proceed with coronary angiogram.  Risks, benefits and alternatives of left coronary angiogram, PTCA and stent explained to patient at length. All questions answered.

## 2019-02-07 LAB
ALBUMIN SERPL-MCNC: 4.3 G/DL (ref 3.4–5)
ALBUMIN/GLOB SERPL: 1.3 {RATIO} (ref 0.8–1.7)
ALP SERPL-CCNC: 85 U/L (ref 45–117)
ALT SERPL-CCNC: 15 U/L (ref 16–61)
ANION GAP SERPL CALC-SCNC: 8 MMOL/L (ref 3–18)
AST SERPL-CCNC: 17 U/L (ref 15–37)
BILIRUB SERPL-MCNC: 0.5 MG/DL (ref 0.2–1)
BUN SERPL-MCNC: 16 MG/DL (ref 7–18)
BUN/CREAT SERPL: 15 (ref 12–20)
CALCIUM SERPL-MCNC: 9.1 MG/DL (ref 8.5–10.1)
CHLORIDE SERPL-SCNC: 103 MMOL/L (ref 100–108)
CO2 SERPL-SCNC: 28 MMOL/L (ref 21–32)
CREAT SERPL-MCNC: 1.06 MG/DL (ref 0.6–1.3)
ERYTHROCYTE [DISTWIDTH] IN BLOOD BY AUTOMATED COUNT: 12.2 % (ref 11.6–14.5)
GLOBULIN SER CALC-MCNC: 3.4 G/DL (ref 2–4)
GLUCOSE SERPL-MCNC: 88 MG/DL (ref 74–99)
HCT VFR BLD AUTO: 47.4 % (ref 36–48)
HGB BLD-MCNC: 14.6 G/DL (ref 13–16)
MAGNESIUM SERPL-MCNC: 2.5 MG/DL (ref 1.6–2.6)
MCH RBC QN AUTO: 27.8 PG (ref 24–34)
MCHC RBC AUTO-ENTMCNC: 30.8 G/DL (ref 31–37)
MCV RBC AUTO: 90.3 FL (ref 74–97)
PLATELET # BLD AUTO: 224 K/UL (ref 135–420)
PMV BLD AUTO: 11.5 FL (ref 9.2–11.8)
POTASSIUM SERPL-SCNC: 5.4 MMOL/L (ref 3.5–5.5)
PROT SERPL-MCNC: 7.7 G/DL (ref 6.4–8.2)
RBC # BLD AUTO: 5.25 M/UL (ref 4.7–5.5)
SODIUM SERPL-SCNC: 139 MMOL/L (ref 136–145)
TSH SERPL DL<=0.05 MIU/L-ACNC: 0.38 UIU/ML (ref 0.36–3.74)
WBC # BLD AUTO: 4.7 K/UL (ref 4.6–13.2)

## 2019-02-07 RX ORDER — GABAPENTIN 400 MG/1
CAPSULE ORAL
Qty: 90 CAP | Refills: 0 | Status: SHIPPED | OUTPATIENT
Start: 2019-02-07 | End: 2019-02-19 | Stop reason: SDUPTHER

## 2019-02-08 NOTE — TELEPHONE ENCOUNTER
Prescription was sent to mail order, should've been sent to [facility]. Please resend.  Patient need to take prior to surgery on 02.14.19

## 2019-02-11 RX ORDER — METOPROLOL TARTRATE 25 MG/1
25 TABLET, FILM COATED ORAL 2 TIMES DAILY
Qty: 60 TAB | Refills: 3 | Status: SHIPPED | OUTPATIENT
Start: 2019-02-11 | End: 2019-02-14

## 2019-02-14 ENCOUNTER — HOSPITAL ENCOUNTER (OUTPATIENT)
Age: 69
Setting detail: OUTPATIENT SURGERY
Discharge: HOME | End: 2019-02-14
Attending: INTERNAL MEDICINE | Admitting: INTERNAL MEDICINE
Payer: MEDICARE

## 2019-02-14 VITALS
BODY MASS INDEX: 28.17 KG/M2 | HEIGHT: 72 IN | OXYGEN SATURATION: 95 % | TEMPERATURE: 97.5 F | HEART RATE: 72 BPM | DIASTOLIC BLOOD PRESSURE: 60 MMHG | RESPIRATION RATE: 10 BRPM | SYSTOLIC BLOOD PRESSURE: 124 MMHG | WEIGHT: 208 LBS

## 2019-02-14 DIAGNOSIS — I20.0 INTERMEDIATE CORONARY SYNDROME (HCC): ICD-10-CM

## 2019-02-14 LAB — END DIASTOLIC PRESSURE: 13

## 2019-02-14 PROCEDURE — 74011250636 HC RX REV CODE- 250/636: Performed by: INTERNAL MEDICINE

## 2019-02-14 PROCEDURE — 74011636320 HC RX REV CODE- 636/320: Performed by: INTERNAL MEDICINE

## 2019-02-14 PROCEDURE — 93458 L HRT ARTERY/VENTRICLE ANGIO: CPT | Performed by: INTERNAL MEDICINE

## 2019-02-14 PROCEDURE — C1894 INTRO/SHEATH, NON-LASER: HCPCS | Performed by: INTERNAL MEDICINE

## 2019-02-14 PROCEDURE — 77030015766: Performed by: INTERNAL MEDICINE

## 2019-02-14 PROCEDURE — 77030029997 HC DEV COM RDL R BND TELE -B: Performed by: INTERNAL MEDICINE

## 2019-02-14 PROCEDURE — 99152 MOD SED SAME PHYS/QHP 5/>YRS: CPT | Performed by: INTERNAL MEDICINE

## 2019-02-14 PROCEDURE — 74011000250 HC RX REV CODE- 250: Performed by: INTERNAL MEDICINE

## 2019-02-14 PROCEDURE — 74011250637 HC RX REV CODE- 250/637: Performed by: INTERNAL MEDICINE

## 2019-02-14 PROCEDURE — 77030013797 HC KT TRNSDUC PRSSR EDWD -A: Performed by: INTERNAL MEDICINE

## 2019-02-14 PROCEDURE — 74011250636 HC RX REV CODE- 250/636

## 2019-02-14 RX ORDER — LIDOCAINE HYDROCHLORIDE 10 MG/ML
INJECTION, SOLUTION EPIDURAL; INFILTRATION; INTRACAUDAL; PERINEURAL AS NEEDED
Status: DISCONTINUED | OUTPATIENT
Start: 2019-02-14 | End: 2019-02-14 | Stop reason: HOSPADM

## 2019-02-14 RX ORDER — GUAIFENESIN 100 MG/5ML
81 LIQUID (ML) ORAL ONCE
Status: COMPLETED | OUTPATIENT
Start: 2019-02-14 | End: 2019-02-14

## 2019-02-14 RX ORDER — FENTANYL CITRATE 50 UG/ML
INJECTION, SOLUTION INTRAMUSCULAR; INTRAVENOUS AS NEEDED
Status: DISCONTINUED | OUTPATIENT
Start: 2019-02-14 | End: 2019-02-14 | Stop reason: HOSPADM

## 2019-02-14 RX ORDER — VERAPAMIL HYDROCHLORIDE 2.5 MG/ML
INJECTION, SOLUTION INTRAVENOUS AS NEEDED
Status: DISCONTINUED | OUTPATIENT
Start: 2019-02-14 | End: 2019-02-14 | Stop reason: HOSPADM

## 2019-02-14 RX ORDER — MIDAZOLAM HYDROCHLORIDE 1 MG/ML
INJECTION, SOLUTION INTRAMUSCULAR; INTRAVENOUS AS NEEDED
Status: DISCONTINUED | OUTPATIENT
Start: 2019-02-14 | End: 2019-02-14 | Stop reason: HOSPADM

## 2019-02-14 NOTE — PROGRESS NOTES
Right wrist R-Band removed, no bleeding or swelling. Normal radial pulse, normal distal circulation and neuro check. Sterile hemostatic dressing applied. Safety splint applied. Safety instructions reviewed.

## 2019-02-14 NOTE — PROGRESS NOTES
A+Ox4, denied any complaints, ambulatory without difficulty. Right wrist dressing intact, no bleeding or swelling. Discharge information reviewed. Escorted to car, tot his wife for transport home.

## 2019-02-14 NOTE — Clinical Note
TRANSFER - IN REPORT:  
 
Verbal report received from: Kera Atkinson RN [facility]. Report consisted of patient's Situation, Background, Assessment and  
Recommendations(SBAR). Opportunity for questions and clarification was provided. Assessment completed upon patient's arrival to unit and care assumed. Patient transported with a Cardiac Cath Tech / Patient Care Tech.

## 2019-02-14 NOTE — DISCHARGE INSTRUCTIONS
Patient Education         DISCHARGE SUMMARY from Nurse:    Please resume taking your home medication as prescribed. PATIENT INSTRUCTIONS:    After general anesthesia or intravenous sedation, for 24 hours or while taking prescription Narcotics:  · Limit your activities  · Do not drive and operate hazardous machinery  · Do not make important personal or business decisions  · Do  not drink alcoholic beverages  · If you have not urinated within 8 hours after discharge, please contact your surgeon on call. Report the following to your surgeon:  · Excessive pain, swelling, redness or odor of or around the surgical area  · Temperature over 100.5  · Nausea and vomiting lasting longer than 4 hours or if unable to take medications  · Any signs of decreased circulation or nerve impairment to extremity: change in color, persistent  numbness, tingling, coldness or increase pain  · Any questions    What to do at Home:  Recommended activity: {discharge activity:66753}, ***    If you experience any of the following symptoms ***, please follow up with ***. *  Please give a list of your current medications to your Primary Care Provider. *  Please update this list whenever your medications are discontinued, doses are      changed, or new medications (including over-the-counter products) are added. *  Please carry medication information at all times in case of emergency situations. These are general instructions for a healthy lifestyle:    No smoking/ No tobacco products/ Avoid exposure to second hand smoke  Surgeon General's Warning:  Quitting smoking now greatly reduces serious risk to your health.     Obesity, smoking, and sedentary lifestyle greatly increases your risk for illness    A healthy diet, regular physical exercise & weight monitoring are important for maintaining a healthy lifestyle    You may be retaining fluid if you have a history of heart failure or if you experience any of the following symptoms: Weight gain of 3 pounds or more overnight or 5 pounds in a week, increased swelling in our hands or feet or shortness of breath while lying flat in bed. Please call your doctor as soon as you notice any of these symptoms; do not wait until your next office visit. Recognize signs and symptoms of STROKE:    F-face looks uneven    A-arms unable to move or move unevenly    S-speech slurred or non-existent    T-time-call 911 as soon as signs and symptoms begin-DO NOT go       Back to bed or wait to see if you get better-TIME IS BRAIN. Warning Signs of HEART ATTACK     Call 911 if you have these symptoms:   Chest discomfort. Most heart attacks involve discomfort in the center of the chest that lasts more than a few minutes, or that goes away and comes back. It can feel like uncomfortable pressure, squeezing, fullness, or pain.  Discomfort in other areas of the upper body. Symptoms can include pain or discomfort in one or both arms, the back, neck, jaw, or stomach.  Shortness of breath with or without chest discomfort.  Other signs may include breaking out in a cold sweat, nausea, or lightheadedness. Don't wait more than five minutes to call 911 - MINUTES MATTER! Fast action can save your life. Calling 911 is almost always the fastest way to get lifesaving treatment. Emergency Medical Services staff can begin treatment when they arrive -- up to an hour sooner than if someone gets to the hospital by car. The discharge information has been reviewed with the {PATIENT PARENT GUARDIAN:15667}. The {PATIENT PARENT GUARDIAN:01333} verbalized understanding. Discharge medications reviewed with the {Dishcarge meds reviewed ONWW:41192} and appropriate educational materials and side effects teaching were provided.   ___________________________________________________________________________________________________________________________________     Coronary Angiogram: What to Expect at [place] [address] coronary angiogram is a test to examine the large blood vessel of your heart (coronary artery). The doctor inserted a thin, flexible tube (catheter) into a blood vessel in your groin. In some cases, the catheter is placed in a blood vessel in the arm. Your groin or arm may have a bruise and feel sore for a day or two after a coronary angiogram. You can do light activities around the house but nothing strenuous for several days. This care sheet gives you a general idea about how long it will take for you to recover. But each person recovers at a different pace. Follow the steps below to feel better as quickly as possible. How can you care for yourself at home? Activity    · If the doctor gave you a sedative:  ? For 24 hours, don't do anything that requires attention to detail. It takes time for the medicine's effects to completely wear off.  ? For your safety, do not drive or operate any machinery that could be dangerous. Wait until the medicine wears off and you can think clearly and react easily.     · Do not do strenuous exercise and do not lift, pull, or push anything heavy until your doctor says it is okay. This may be for a day or two. You can walk around the house and do light activity, such as cooking.     · If the catheter was placed in your groin, try not to walk up stairs for the first couple of days.     · If the catheter was placed in your arm near your wrist, do not bend your wrist deeply for the first couple of days. Be careful using your hand to get into and out of a chair or bed.     · If your doctor recommends it, get more exercise. Walking is a good choice. Bit by bit, increase the amount you walk every day. Try for at least 30 minutes on most days of the week. Diet    · Drink plenty of fluids to help your body flush out the dye.  If you have kidney, heart, or liver disease and have to limit fluids, talk with your doctor before you increase the amount of fluids you drink.     · Keep eating a heart-healthy diet that has lots of fruits, vegetables, and whole grains. If you have not been eating this way, talk to your doctor. You also may want to talk to a dietitian. This expert can help you to learn about healthy foods and plan meals. Medicines    · Your doctor will tell you if and when you can restart your medicines. He or she will also give you instructions about taking any new medicines.     · If you take blood thinners, such as warfarin (Coumadin), clopidogrel (Plavix), or aspirin, be sure to talk to your doctor. He or she will tell you if and when to start taking those medicines again. Make sure that you understand exactly what your doctor wants you to do.     · Your doctor may prescribe a blood-thinning medicine like aspirin or clopidogrel (Plavix). It is very important that you take these medicines exactly as directed in order to keep the coronary artery open and reduce your risk of a heart attack. Be safe with medicines. Call your doctor if you think you are having a problem with your medicine.    Care of the catheter site    · For 1 or 2 days, keep a bandage over the spot where the catheter was inserted. The bandage probably will fall off in this time.     · Put ice or a cold pack on the area for 10 to 20 minutes at a time to help with soreness or swelling. Put a thin cloth between the ice and your skin.     · You may shower 24 to 48 hours after the procedure, if your doctor okays it. Pat the incision dry.     · Do not soak the catheter site until it is healed. Don't take a bath for 1 week, or until your doctor tells you it is okay.     · If you are bleeding, lie down and press on the area for 15 minutes to try to make it stop. If the bleeding does not stop, call your doctor or seek immediate medical care. Follow-up care is a key part of your treatment and safety. Be sure to make and go to all appointments, and call your doctor if you are having problems.  It's also a good idea to know your test results and keep a list of the medicines you take. When should you call for help? Call 911 anytime you think you may need emergency care. For example, call if:    · You passed out (lost consciousness).     · You have severe trouble breathing.     · You have sudden chest pain and shortness of breath, or you cough up blood.     · You have symptoms of a heart attack. These may include:  ? Chest pain or pressure, or a strange feeling in the chest.  ? Sweating. ? Shortness of breath. ? Nausea or vomiting. ? Pain, pressure, or a strange feeling in the back, neck, jaw, or upper belly, or in one or both shoulders or arms. ? Lightheadedness or sudden weakness. ? A fast or irregular heartbeat. After you call 911, the  may tel you to chew 1 adult-strength or 2 to 4 low-dose aspirin. Wait for an ambulance. Do not try to drive yourself.     · You have been diagnosed with angina, and you have symptoms that do not go away with rest or are not getting better within 5 minutes after you take a dose of nitroglycerin.    Call your doctor now or seek immediate medical care if:    · You are bleeding from the area where the catheter was put in your artery.     · You have a fast-growing, painful lump at the catheter site.     · You have signs of infection, such as:  ? Increased pain, swelling, warmth, or redness. ? Red streaks leading from the catheter site. ? Pus draining from the catheter site. ? A fever.     · Your leg or arm looks blue or feels cold, numb, or tingly.    Watch closely for changes in your health, and be sure to contact your doctor if you have any problems. Where can you learn more? Go to http://vazquez-shraddha.info/. Enter S685 in the search box to learn more about \"Coronary Angiogram: What to Expect at Home. \"  Current as of: July 22, 2018  Content Version: 11.9  © 6653-7473 [employer].  Care instructions adapted under license by [employer] (which disclaims liability or warranty for this information). If you have questions about a medical condition or this instruction, always ask your healthcare professional. [place] [address] any warranty or liability for your use of this information. Cardiac Catheterization/Angiography Discharge Instructions    *Check the puncture site frequently for swelling or bleeding. If you see any bleeding, lie down and apply pressure over the area with a clean town or washcloth. Notify your doctor for any redness, swelling, drainage or oozing from the puncture site. Notify your doctor for any fever or chills. *If the leg or arm with the puncture becomes cold, numb or painful, call Dr Tobi Henriquez MD at  [phone]    *Activity should be limited for the next 48 hours. Climb stairs as little as possible and avoid any stooping, bending or strenuous activity for 48 hours. No heavy lifting (anything over 10 pounds) for three days. *Do not drive for 48 hours. *You may resume your usual diet. Drink more fluids than usual.    *Have a responsible person drive you home and stay with you for at least 24 hours after your heart catheterization/angiography. *You may remove the bandage from your Right Wrist in 24 hours. You may shower in 24 hours. No tub baths, hot tubs or swimming for one week. Do not place any lotions, creams, powders, ointments over the puncture site for one week. You may place a clean band-aid over the puncture site each day for 5 days. Change this daily. Patient armband removed and shredded  MyChart Activation    Thank you for requesting access to [employer]. Please follow the instructions below to securely access and download your online medical record. [employer] allows you to send messages to your doctor, view your test results, renew your prescriptions, schedule appointments, and more. How Do I Sign Up? 1.  In your internet browser, go to [email or web address]. 2. Click on the First Time User? Click Here link in the Sign In box. You will see the New Member Sign Up page. 3. Enter your [employer] Access Code exactly as it appears below. You will not need to use this code after youve completed the sign-up process. If you do not sign up before the expiration date, you must request a new code. [employer] Access Code: [email or web address]  Expires: 3/23/2019  2:34 PM (This is the date your [employer] access code will )    4. Enter the last four digits of your Social Security Number (xxxx) and Date of Birth (mm/dd/yyyy) as indicated and click Submit. You will be taken to the next sign-up page. 5. Create a [employer] ID. This will be your [employer] login ID and cannot be changed, so think of one that is secure and easy to remember. 6. Create a [employer] password. You can change your password at any time. 7. Enter your Password Reset Question and Answer. This can be used at a later time if you forget your password. 8. Enter your e-mail address. You will receive e-mail notification when new information is available in [address] 9. Click Sign Up. You can now view and download portions of your medical record. 10. Click the Download Summary menu link to download a portable copy of your medical information. Additional Information    If you have questions, please visit the Frequently Asked Questions section of the [employer] website at [email or web address] Remember, [employer] is NOT to be used for urgent needs. For medical emergencies, dial 911.

## 2019-02-14 NOTE — Clinical Note
TRANSFER - OUT REPORT:  
 
Verbal report given to: Amy Zepeda RN. Report consisted of patient's Situation, Background, Assessment and  
Recommendations(SBAR). Opportunity for questions and clarification was provided. Patient transported with a Registered Nurse. Patient transported to: [address]

## 2019-02-14 NOTE — Clinical Note
Contrast Dose Calculator:  
Patient's age: 78.  
Patient's sex: Male. Patient weight (kg) = 94.3. Creatinine level (mg/dL) = 1.06.  
Creatinine clearance (mL/min): 86.49. Contrast concentration (mg/mL) = 300. MACD = 300 mL. Max Contrast dose per Creatinine Cl calculator = 194.6 mL.

## 2019-02-19 DIAGNOSIS — G62.9 NEUROPATHY: ICD-10-CM

## 2019-02-19 DIAGNOSIS — M54.50 CHRONIC MIDLINE LOW BACK PAIN WITHOUT SCIATICA: ICD-10-CM

## 2019-02-19 DIAGNOSIS — G89.29 CHRONIC MIDLINE LOW BACK PAIN WITHOUT SCIATICA: ICD-10-CM

## 2019-02-19 RX ORDER — GABAPENTIN 400 MG/1
CAPSULE ORAL
Qty: 90 CAP | Refills: 0 | Status: SHIPPED | OUTPATIENT
Start: 2019-02-19 | End: 2019-03-12 | Stop reason: SDUPTHER

## 2019-02-19 NOTE — TELEPHONE ENCOUNTER
Requested Prescriptions     Pending Prescriptions Disp Refills    gabapentin (NEURONTIN) 400 mg capsule 90 Cap 0     Sig: TAKE 1 CAPSULE BY MOUTH AT  BEDTIME FOR 1 WEEK THEN 1  CAPSULE TWO TIMES DAILY FOR 1 WEEK THEN 1 CAPSULE 3  TIMES DAILY       Patient called to state that [facility] has been trying to get in touch regarding his medication to gain more refills, since it was only prescribed once with 0 refills.    phone: [phone]  Fax: [phone]

## 2019-03-12 ENCOUNTER — OFFICE VISIT (OUTPATIENT)
Dept: FAMILY MEDICINE CLINIC | Age: 69
End: 2019-03-12

## 2019-03-12 VITALS
DIASTOLIC BLOOD PRESSURE: 89 MMHG | TEMPERATURE: 97.8 F | RESPIRATION RATE: 16 BRPM | SYSTOLIC BLOOD PRESSURE: 161 MMHG | WEIGHT: 208 LBS | BODY MASS INDEX: 28.17 KG/M2 | HEIGHT: 72 IN | OXYGEN SATURATION: 97 % | HEART RATE: 86 BPM

## 2019-03-12 DIAGNOSIS — E78.5 DYSLIPIDEMIA: ICD-10-CM

## 2019-03-12 DIAGNOSIS — M54.50 CHRONIC MIDLINE LOW BACK PAIN WITHOUT SCIATICA: ICD-10-CM

## 2019-03-12 DIAGNOSIS — G62.9 NEUROPATHY: ICD-10-CM

## 2019-03-12 DIAGNOSIS — N52.9 ERECTILE DYSFUNCTION, UNSPECIFIED ERECTILE DYSFUNCTION TYPE: ICD-10-CM

## 2019-03-12 DIAGNOSIS — G89.29 CHRONIC MIDLINE LOW BACK PAIN WITHOUT SCIATICA: ICD-10-CM

## 2019-03-12 DIAGNOSIS — I10 ESSENTIAL HYPERTENSION: Primary | ICD-10-CM

## 2019-03-12 RX ORDER — AMLODIPINE BESYLATE 10 MG/1
TABLET ORAL
Qty: 90 TAB | Refills: 1 | Status: SHIPPED | OUTPATIENT
Start: 2019-03-12 | End: 2019-09-30 | Stop reason: SDUPTHER

## 2019-03-12 RX ORDER — GABAPENTIN 400 MG/1
400 CAPSULE ORAL 3 TIMES DAILY
Qty: 270 CAP | Refills: 0 | Status: SHIPPED | OUTPATIENT
Start: 2019-03-12 | End: 2019-10-22

## 2019-03-12 RX ORDER — ATORVASTATIN CALCIUM 40 MG/1
TABLET, FILM COATED ORAL
Qty: 90 TAB | Refills: 1 | Status: SHIPPED | OUTPATIENT
Start: 2019-03-12 | End: 2019-09-30 | Stop reason: SDUPTHER

## 2019-03-12 RX ORDER — BENAZEPRIL HYDROCHLORIDE 40 MG/1
TABLET ORAL
Qty: 90 TAB | Refills: 1 | Status: SHIPPED | OUTPATIENT
Start: 2019-03-12 | End: 2019-09-30 | Stop reason: SDUPTHER

## 2019-03-12 NOTE — PROGRESS NOTES
Chief Complaint   Patient presents with    Back Pain     pain is managed well with medication    Hypertension     1. Have you been to the ER, urgent care clinic since your last visit? Hospitalized since your last visit? No    2. Have you seen or consulted any other health care providers outside of the [address] [place] [address] since your last visit? Include any pap smears or colon screening. Yes, cardiology testing including stress test.      HPI Claudell Knapp comes in for follow-up care. Patient has hypertension. He is on benazepril and amlodipine. He does state that his blood pressure numbers have been stable though today it is slightly elevated. States that this is because of not taking his medication this morning. Discussed good blood pressure control. He would prefer to hold off on any medication adjustments. He will keep a blood pressure log and follow-up with me at next visit. He denies headache, changes in vision or focal weakness. Patient has a history of back pain that has been chronic. He is followed up by the spine specialist.  He is on gabapentin and this seems to help with the pain. Overall he feels improved. Patient has a history of erectile dysfunction. He takes Cialis. Would like a refill of medication sent into the pharmacy. He has dyslipidemia. Has been doing lifestyle and dietary modification. He has cut out  polysaturated fats in his diet. He is on Lipitor. Tolerates medication. We will follow-up at next visit. Patient has neuropathy. He gets numbness and tingling of his lower extremities and upper extremities. He is on gabapentin. This has helped with his symptoms. Continue current treatment plan.       Past Medical History  Past Medical History:   Diagnosis Date    Aortic valve insufficiency     Arthritis     Hypertension     Other ill-defined conditions(247.86)     murmer       Surgical History  Past Surgical History:   Procedure Laterality Date    HX APPENDECTOMY      HX OTHER SURGICAL Left     Exc. mass foot     HX OTHER SURGICAL Right     release Dequervain's contracture    HX OTHER SURGICAL Left     Removal large nevi behind ear        Medications  Current Outpatient Medications   Medication Sig Dispense Refill    gabapentin (NEURONTIN) 400 mg capsule Take 1 Cap by mouth three (3) times daily. 270 Cap 0    benazepril (LOTENSIN) 40 mg tablet TAKE 1 TABLET BY MOUTH  DAILY 90 Tab 1    amLODIPine (NORVASC) 10 mg tablet TAKE 1 TABLET BY MOUTH  DAILY 90 Tab 1    atorvastatin (LIPITOR) 40 mg tablet TAKE 1 TABLET BY MOUTH  DAILY 90 Tab 1    finasteride (PROSCAR) 5 mg tablet Take 1 Tab by mouth daily. 90 Tab 3    travoprost (TRAVATAN Z) 0.004 % ophthalmic solution Administer 1 Drop to both eyes nightly.  acetaminophen (TYLENOL EXTRA STRENGTH) 500 mg tablet Take 1,000 mg by mouth every six (6) hours as needed for Pain.  tadalafil (CIALIS) 20 mg tablet Take 1 Tab by mouth as needed.  Indications: Erectile Dysfunction 20 Tab 10       Allergies  No Known Allergies    Family History  Family History   Problem Relation Age of Onset    No Known Problems Mother     No Known Problems Father        Social History  Social History     Socioeconomic History    Marital status:      Spouse name: Not on file    Number of children: Not on file    Years of education: Not on file    Highest education level: Not on file   Social Needs    Financial resource strain: Not on file    Food insecurity - worry: Not on file    Food insecurity - inability: Not on file   [email or web address] needs - medical: Not on file   [email or web address] needs - non-medical: Not on file   Occupational History    Not on file   Tobacco Use    Smoking status: Former Smoker     Last attempt to quit: 1980     Years since quittin.2    Smokeless tobacco: Never Used   Substance and Sexual Activity    Alcohol use: No    Drug use: No     Comment: H/O in the past Marijuana    Sexual activity: Not on file   Other Topics Concern    Not on file   Social History Narrative    Not on file       Review of Systems  Review of Systems -review of all systems is negative except as noted above in the HPI. Vital Signs  Visit Vitals  /89 (BP 1 Location: Left arm, BP Patient Position: Sitting)   Pulse 86   Temp 97.8 °F (36.6 °C) (Oral)   Resp 16   Ht 6' (1.829 m)   Wt 208 lb (94.3 kg)   SpO2 97%   BMI 28.21 kg/m²         Physical Exam  Physical Examination: General appearance - oriented to person, place, and time, overweight, acyanotic, in no respiratory distress and well hydrated  Mental status - alert, oriented to person, place, and time, affect appropriate to mood  Mouth - mucous membranes moist, pharynx normal without lesions  Neck - supple, no significant adenopathy  Lymphatics - no palpable lymphadenopathy  Chest - clear to auscultation, no wheezes, rales or rhonchi, symmetric air entry  Heart - normal rate and regular rhythm, S1 and S2 normal  Back exam - limited range of motion  Neurological - normal muscle tone, no tremors, strength 5/5  Musculoskeletal - osteoarthritic changes noted in both hands  Extremities - no pedal edema noted, intact peripheral pulses    Results  Results for orders placed or performed during the hospital encounter of 02/14/19   CARDIAC PROCEDURE   Result Value Ref Range    EDP 13        ASSESSMENT and PLAN    ICD-10-CM ICD-9-CM    1. Essential hypertension I10 401.9 benazepril (LOTENSIN) 40 mg tablet      amLODIPine (NORVASC) 10 mg tablet   2. Chronic midline low back pain without sciatica M54.5 724.2 gabapentin (NEURONTIN) 400 mg capsule    G89.29 338.29    3. Neuropathy G62.9 355.9 gabapentin (NEURONTIN) 400 mg capsule   4. Dyslipidemia E78.5 272.4 atorvastatin (LIPITOR) 40 mg tablet   5.  Erectile dysfunction, unspecified erectile dysfunction type N52.9 607.84      lab results and schedule of future lab studies reviewed with patient  reviewed diet, exercise and weight control  reviewed medications and side effects in detail      I have discussed the diagnosis with the patient and the intended plan of care as seen in the above orders. The patient has received an after-visit summary and questions were answered concerning future plans. I have discussed medication, side effects, and warnings with the patient in detail. The patient verbalized understanding and is in agreement with the plan of care. The patient will contact the office with any additional concerns.     Hernandez Linder MD

## 2019-03-13 ENCOUNTER — OFFICE VISIT (OUTPATIENT)
Dept: CARDIOLOGY CLINIC | Age: 69
End: 2019-03-13

## 2019-03-13 VITALS
SYSTOLIC BLOOD PRESSURE: 137 MMHG | WEIGHT: 208 LBS | BODY MASS INDEX: 28.17 KG/M2 | DIASTOLIC BLOOD PRESSURE: 68 MMHG | HEART RATE: 69 BPM | HEIGHT: 72 IN

## 2019-03-13 DIAGNOSIS — I25.10 MILD CAD: ICD-10-CM

## 2019-03-13 DIAGNOSIS — E78.5 DYSLIPIDEMIA: ICD-10-CM

## 2019-03-13 DIAGNOSIS — I35.1 NONRHEUMATIC AORTIC VALVE INSUFFICIENCY: Primary | ICD-10-CM

## 2019-03-13 DIAGNOSIS — R94.31 ABNORMAL EKG: ICD-10-CM

## 2019-03-13 DIAGNOSIS — I10 ESSENTIAL HYPERTENSION: ICD-10-CM

## 2019-03-13 NOTE — PROGRESS NOTES
HISTORY OF PRESENT ILLNESS  Andre Ferraro is a 78 y.o. male. Hypertension   The history is provided by the patient. This is a chronic problem. The problem occurs every several days. The problem has not changed since onset. Associated symptoms include chest pain. Chest Pain (Angina)    The history is provided by the patient. This is a chronic problem. The problem has not changed since onset. The problem occurs every several days. The pain is associated with exertion and normal activity. The pain is present in the left side. The pain is at a severity of 3/10. The quality of the pain is described as sharp. Associated symptoms include malaise/fatigue. Pertinent negatives include no claudication, no cough, no diaphoresis, no dizziness, no fever, no hemoptysis, no nausea, no orthopnea, no palpitations, no PND, no sputum production, no vomiting and no weakness. Risk factors include hypertension. His past medical history is significant for HTN. Pertinent negatives include no cardiac catheterization, no echocardiogram and no stress thallium. Review of Systems   Constitutional: Positive for malaise/fatigue. Negative for chills, diaphoresis, fever and weight loss. HENT: Negative for congestion, ear discharge, ear pain, hearing loss, nosebleeds and tinnitus. Eyes: Negative for blurred vision. Respiratory: Negative for cough, hemoptysis, sputum production, wheezing and stridor. Cardiovascular: Positive for chest pain. Negative for palpitations, orthopnea, claudication, leg swelling and PND. Gastrointestinal: Negative for heartburn, nausea and vomiting. Musculoskeletal: Negative for myalgias and neck pain. Skin: Negative for itching and rash. Neurological: Negative for dizziness, tingling, tremors, focal weakness, loss of consciousness and weakness. Psychiatric/Behavioral: Negative for depression and suicidal ideas.      Family History   Problem Relation Age of Onset    No Known Problems Mother     No Known Problems Father        Past Medical History:   Diagnosis Date    Aortic valve insufficiency     Arthritis     Hypertension     Other ill-defined conditions(799.89)     murmer       Past Surgical History:   Procedure Laterality Date    HX APPENDECTOMY      HX OTHER SURGICAL Left     Exc. mass foot     HX OTHER SURGICAL Right     release Dequervain's contracture    HX OTHER SURGICAL Left     Removal large nevi behind ear       Social History     Tobacco Use    Smoking status: Former Smoker     Last attempt to quit: 1980     Years since quittin.2    Smokeless tobacco: Never Used   Substance Use Topics    Alcohol use: No       No Known Allergies    Outpatient Medications Marked as Taking for the 3/13/19 encounter (Office Visit) with Corby Jeter MD   Medication Sig Dispense Refill    gabapentin (NEURONTIN) 400 mg capsule Take 1 Cap by mouth three (3) times daily. 270 Cap 0    benazepril (LOTENSIN) 40 mg tablet TAKE 1 TABLET BY MOUTH  DAILY 90 Tab 1    amLODIPine (NORVASC) 10 mg tablet TAKE 1 TABLET BY MOUTH  DAILY 90 Tab 1    atorvastatin (LIPITOR) 40 mg tablet TAKE 1 TABLET BY MOUTH  DAILY 90 Tab 1    finasteride (PROSCAR) 5 mg tablet Take 1 Tab by mouth daily. 90 Tab 3    travoprost (TRAVATAN Z) 0.004 % ophthalmic solution Administer 1 Drop to both eyes nightly.  acetaminophen (TYLENOL EXTRA STRENGTH) 500 mg tablet Take 1,000 mg by mouth every six (6) hours as needed for Pain. Visit Vitals  /68   Pulse 69   Ht 6' (1.829 m)   Wt 94.3 kg (208 lb)   BMI 28.21 kg/m²     Physical Exam   Constitutional: He is oriented to person, place, and time. He appears well-developed and well-nourished. No distress. HENT:   Head: Atraumatic. Mouth/Throat: No oropharyngeal exudate. Eyes: Conjunctivae are normal. Right eye exhibits no discharge. Left eye exhibits no discharge. No scleral icterus. Neck: Normal range of motion. Neck supple.  No JVD present. No tracheal deviation present. No thyromegaly present. Cardiovascular: Normal rate and regular rhythm. Exam reveals no gallop. No murmur heard. Pulmonary/Chest: Effort normal and breath sounds normal. No stridor. He has no wheezes. He has no rales. Abdominal: Soft. There is no tenderness. There is no rebound. Musculoskeletal: Normal range of motion. He exhibits no edema or tenderness. Lymphadenopathy:     He has no cervical adenopathy. Neurological: He is alert and oriented to person, place, and time. He exhibits normal muscle tone. Skin: Skin is warm. He is not diaphoretic. Psychiatric: He has a normal mood and affect. His behavior is normal.     ekg sinus rhythm with t wave inversion in anterior leads. 10/15/18   ECHO ADULT COMPLETE 10/19/2018 10/19/2018    Narrative · Normal left ventricular cavity size and systolic function (ejection   fraction normal). Moderate concentric hypertrophy observed. The estimated   ejection fraction is 61 - 65%. No regional wall motion abnormality noted. There is mild (grade 1) left ventricular diastolic dysfunction. · Normal right ventricular size and function. · The left atrial cavity size is mildly dilated. LA index is 35.03 ml/m2. · The right atiral cavity size is mildly dilated. · Echogenicity visualized on the right coronary cusp of the aortic valve,   cannot rule out healed vegetation vs. calcification. Moderate aortic valve   regurgitation with an eccentrically directed jet. · There is mild leaflet thickening of the mitral valve leaflets with mild   mitral annular calcification and mild regurgitation. · Mild tricuspid valve regurgitation. Pulmonary arterial systolic pressure   is 39 mmHg. · Mild pulmonic regurgitation. · The aortic root is mildly dilated. · Mild aortic valve sclerosis. Echogenicity visualized on the right   coronary cusp, cannot rule out calcification. Moderate aortic valve   regurgitation is present.   · Pulmonary arterial systolic pressure is 46.6 mmHg. · Pulmonary arterial systolic pressure is 82.0 mmHg. Signed by: Ryan Greene MD     Lexiscan : no ischemia  ASSESSMENT and PLAN    ICD-10-CM ICD-9-CM    1. Nonrheumatic aortic valve insufficiency I35.1 424.1     moderate   2. Dyslipidemia E78.5 272.4    3. Essential hypertension I10 401.9    4. Abnormal EKG R94.31 794.31    5. Mild CAD I25.10 414.00      No orders of the defined types were placed in this encounter. Follow-up Disposition:  Return in about 6 months (around 9/13/2019). current treatment plan is effective, no change in therapy  use of aspirin to prevent MI and TIA's discussed. Patient with risk factors seen for atypical chest pain.  ekg is abnormal with concerns for ischemia  No ischemia on stress test  Normal LV function with mild to moderate aortic regurgitation. No vegetation noted on the aortic valve. Underwent recent cardiac cath - mild CAD with normal LVEF. Continue intense risk factor modification.   Aspirin 81mg daily  Right radial pulse intact with no hematoma or ecchymosis

## 2019-03-13 NOTE — PROGRESS NOTES
1. Have you been to the ER, urgent care clinic since your last visit? Hospitalized since your last visit? No     2. Have you seen or consulted any other health care providers outside of the [address] [place] [address] since your last visit? Include any pap smears or colon screening. Yes Where: pcp     3. Since your last visit, have you had any of the following symptoms? .          4. Have you had any blood work, X-rays or cardiac testing? 5. Where do you normally have your labs drawn?   pcp    6. Do you need any refills today?    No

## 2019-03-13 NOTE — PATIENT INSTRUCTIONS
High Blood Pressure: Care Instructions  Overview    It's normal for blood pressure to go up and down throughout the day. But if it stays up, you have high blood pressure. Another name for high blood pressure is hypertension. Despite what a lot of people think, high blood pressure usually doesn't cause headaches or make you feel dizzy or lightheaded. It usually has no symptoms. But it does increase your risk of stroke, heart attack, and other problems. You and your doctor will talk about your risks of these problems based on your blood pressure. Your doctor will give you a goal for your blood pressure. Your goal will be based on your health and your age. Lifestyle changes, such as eating healthy and being active, are always important to help lower blood pressure. You might also take medicine to reach your blood pressure goal.  Follow-up care is a key part of your treatment and safety. Be sure to make and go to all appointments, and call your doctor if you are having problems. It's also a good idea to know your test results and keep a list of the medicines you take. How can you care for yourself at home? Medical treatment  · If you stop taking your medicine, your blood pressure will go back up. You may take one or more types of medicine to lower your blood pressure. Be safe with medicines. Take your medicine exactly as prescribed. Call your doctor if you think you are having a problem with your medicine. · Talk to your doctor before you start taking aspirin every day. Aspirin can help certain people lower their risk of a heart attack or stroke. But taking aspirin isn't right for everyone, because it can cause serious bleeding. · See your doctor regularly. You may need to see the doctor more often at first or until your blood pressure comes down. · If you are taking blood pressure medicine, talk to your doctor before you take decongestants or anti-inflammatory medicine, such as ibuprofen.  Some of these medicines can raise blood pressure. · Learn how to check your blood pressure at home. Lifestyle changes  · Stay at a healthy weight. This is especially important if you put on weight around the waist. Losing even 10 pounds can help you lower your blood pressure. · If your doctor recommends it, get more exercise. Walking is a good choice. Bit by bit, increase the amount you walk every day. Try for at least 30 minutes on most days of the week. You also may want to swim, bike, or do other activities. · Avoid or limit alcohol. Talk to your doctor about whether you can drink any alcohol. · Try to limit how much sodium you eat to less than 2,300 milligrams (mg) a day. Your doctor may ask you to try to eat less than 1,500 mg a day. · Eat plenty of fruits (such as bananas and oranges), vegetables, legumes, whole grains, and low-fat dairy products. · Lower the amount of saturated fat in your diet. Saturated fat is found in animal products such as milk, cheese, and meat. Limiting these foods may help you lose weight and also lower your risk for heart disease. · Do not smoke. Smoking increases your risk for heart attack and stroke. If you need help quitting, talk to your doctor about stop-smoking programs and medicines. These can increase your chances of quitting for good. When should you call for help? Call 911 anytime you think you may need emergency care. This may mean having symptoms that suggest that your blood pressure is causing a serious heart or blood vessel problem. Your blood pressure may be over 180/120.   For example, call 911 if:    · You have symptoms of a heart attack. These may include:  ? Chest pain or pressure, or a strange feeling in the chest.  ? Sweating. ? Shortness of breath. ? Nausea or vomiting. ? Pain, pressure, or a strange feeling in the back, neck, jaw, or upper belly or in one or both shoulders or arms. ? Lightheadedness or sudden weakness.   ? A fast or irregular heartbeat.     · You have symptoms of a stroke. These may include:  ? Sudden numbness, tingling, weakness, or loss of movement in your face, arm, or leg, especially on only one side of your body. ? Sudden vision changes. ? Sudden trouble speaking. ? Sudden confusion or trouble understanding simple statements. ? Sudden problems with walking or balance. ? A sudden, severe headache that is different from past headaches.     · You have severe back or belly pain.    Do not wait until your blood pressure comes down on its own. Get help right away.   Call your doctor now or seek immediate care if:    · Your blood pressure is much higher than normal (such as 180/120 or higher), but you don't have symptoms.     · You think high blood pressure is causing symptoms, such as:  ? Severe headache.  ? Blurry vision.    Watch closely for changes in your health, and be sure to contact your doctor if:    · Your blood pressure measures higher than your doctor recommends at least 2 times. That means the top number is higher or the bottom number is higher, or both.     · You think you may be having side effects from your blood pressure medicine. Where can you learn more? Go to http://vazquez-shraddha.info/. Enter I597 in the search box to learn more about \"High Blood Pressure: Care Instructions. \"  Current as of: July 22, 2018  Content Version: 11.9  © 9408-2029 [employer]. Care instructions adapted under license by [employer] (which disclaims liability or warranty for this information). If you have questions about a medical condition or this instruction, always ask your healthcare professional. [place] [address] any warranty or liability for your use of this information. [employer] Activation    Thank you for requesting access to [employer]. Please follow the instructions below to securely access and download your online medical record.  [employer] allows you to send messages to your doctor, view your test results, renew your prescriptions, schedule appointments, and more. How Do I Sign Up? 1. In your internet browser, go to [email or web address]. 2. Click on the First Time User? Click Here link in the Sign In box. You will see the New Member Sign Up page. 3. Enter your [employer] Access Code exactly as it appears below. You will not need to use this code after youve completed the sign-up process. If you do not sign up before the expiration date, you must request a new code. [employer] Access Code: [email or web address]  Expires: 3/23/2019  3:34 PM (This is the date your [employer] access code will )    4. Enter the last four digits of your Social Security Number (xxxx) and Date of Birth (mm/dd/yyyy) as indicated and click Submit. You will be taken to the next sign-up page. 5. Create a [employer] ID. This will be your [employer] login ID and cannot be changed, so think of one that is secure and easy to remember. 6. Create a [employer] password. You can change your password at any time. 7. Enter your Password Reset Question and Answer. This can be used at a later time if you forget your password. 8. Enter your e-mail address. You will receive e-mail notification when new information is available in [address] 9. Click Sign Up. You can now view and download portions of your medical record. 10. Click the Download Summary menu link to download a portable copy of your medical information. Additional Information    If you have questions, please visit the Frequently Asked Questions section of the [employer] website at [email or web address] Remember, [employer] is NOT to be used for urgent needs. For medical emergencies, dial 911.

## 2019-04-09 ENCOUNTER — OFFICE VISIT (OUTPATIENT)
Dept: FAMILY MEDICINE CLINIC | Age: 69
End: 2019-04-09

## 2019-04-09 VITALS
BODY MASS INDEX: 27.63 KG/M2 | HEART RATE: 66 BPM | HEIGHT: 72 IN | SYSTOLIC BLOOD PRESSURE: 133 MMHG | RESPIRATION RATE: 18 BRPM | WEIGHT: 204 LBS | DIASTOLIC BLOOD PRESSURE: 76 MMHG | OXYGEN SATURATION: 98 % | TEMPERATURE: 97.7 F

## 2019-04-09 DIAGNOSIS — E66.3 OVERWEIGHT (BMI 25.0-29.9): ICD-10-CM

## 2019-04-09 DIAGNOSIS — Z12.11 SCREEN FOR COLON CANCER: ICD-10-CM

## 2019-04-09 DIAGNOSIS — I10 ESSENTIAL HYPERTENSION: Primary | ICD-10-CM

## 2019-04-09 DIAGNOSIS — M25.50 ARTHRALGIA, UNSPECIFIED JOINT: ICD-10-CM

## 2019-04-09 DIAGNOSIS — T78.40XA ALLERGIC STATE, INITIAL ENCOUNTER: ICD-10-CM

## 2019-04-09 RX ORDER — CETIRIZINE HCL 10 MG
10 TABLET ORAL DAILY
Qty: 90 TAB | Refills: 0 | Status: SHIPPED | OUTPATIENT
Start: 2019-04-09 | End: 2019-09-11

## 2019-04-09 NOTE — PROGRESS NOTES
Chief Complaint   Patient presents with    Hypertension     follow up     Allergies     concern     1. Have you been to the ER, urgent care clinic since your last visit? Hospitalized since your last visit? No    2. Have you seen or consulted any other health care providers outside of the [address] [place] [address] since your last visit? Include any pap smears or colon screening. No     HPI  Staci Murphy comes in for f/u care. Allergies: has seasonal allergies, been sneezing and has congestion. Will send in cetirizine  Cardiac: discussed use of aspirin, he had been advised to take this by the cardiologist. He will get this from [facility]. HTN: BP is stable,, no focal weakness or shortness of breath or headache. Will continue with the current treatment plan. Arthritis: has neck pain and this is due to DJD/spondylosis. He takes tylenol with relief. Encouraged to continue with supportive care measures including icing and heat application as needed. Colonoscopy: due for this. I will send in referral.  Overweight: stable, has lost 4 pounds. Continue lifestyle and dietary modification. Past Medical History  Past Medical History:   Diagnosis Date    Aortic valve insufficiency     Arthritis     Hypertension     Other ill-defined conditions(799.89)     murmer       Surgical History  Past Surgical History:   Procedure Laterality Date    HX APPENDECTOMY  1961    HX OTHER SURGICAL Left 1990's    Exc. mass foot     HX OTHER SURGICAL Right 1980    release Dequervain's contracture    HX OTHER SURGICAL Left 2014    Removal large nevi behind ear        Medications  Current Outpatient Medications   Medication Sig Dispense Refill    gabapentin (NEURONTIN) 400 mg capsule Take 1 Cap by mouth three (3) times daily.  270 Cap 0    benazepril (LOTENSIN) 40 mg tablet TAKE 1 TABLET BY MOUTH  DAILY 90 Tab 1    amLODIPine (NORVASC) 10 mg tablet TAKE 1 TABLET BY MOUTH  DAILY 90 Tab 1    atorvastatin (LIPITOR) 40 mg tablet TAKE 1 TABLET BY MOUTH  DAILY 90 Tab 1    finasteride (PROSCAR) 5 mg tablet Take 1 Tab by mouth daily. 90 Tab 3    travoprost (TRAVATAN Z) 0.004 % ophthalmic solution Administer 1 Drop to both eyes nightly.  acetaminophen (TYLENOL EXTRA STRENGTH) 500 mg tablet Take 1,000 mg by mouth every six (6) hours as needed for Pain.          Allergies  No Known Allergies    Family History  Family History   Problem Relation Age of Onset    No Known Problems Mother     No Known Problems Father        Social History  Social History     Socioeconomic History    Marital status:      Spouse name: Not on file    Number of children: Not on file    Years of education: Not on file    Highest education level: Not on file   Occupational History    Not on file   Social Needs    Financial resource strain: Not on file    Food insecurity:     Worry: Not on file     Inability: Not on file    Transportation needs:     Medical: Not on file     Non-medical: Not on file   Tobacco Use    Smoking status: Former Smoker     Last attempt to quit: 1980     Years since quittin.2    Smokeless tobacco: Never Used   Substance and Sexual Activity    Alcohol use: No    Drug use: No     Comment: H/O in the past Marijuana    Sexual activity: Not on file   Lifestyle    Physical activity:     Days per week: Not on file     Minutes per session: Not on file    Stress: Not on file   Relationships    Social connections:     Talks on phone: Not on file     Gets together: Not on file     Attends [employer] service: Not on file     Active member of club or organization: Not on file     Attends meetings of clubs or organizations: Not on file     Relationship status: Not on file    Intimate partner violence:     Fear of current or ex partner: Not on file     Emotionally abused: Not on file     Physically abused: Not on file     Forced sexual activity: Not on file   Other Topics Concern    Not on file   Social History Narrative    Not on file       Review of Systems  Review of Systems - Review of all systems is negative except as noted above in the HPI. Vital Signs  Visit Vitals  /76 (BP 1 Location: Left arm, BP Patient Position: Sitting)   Pulse 66   Temp 97.7 °F (36.5 °C) (Oral)   Resp 18   Ht 6' (1.829 m)   Wt 204 lb (92.5 kg)   SpO2 98%   BMI 27.67 kg/m²         Physical Exam  Physical Examination: General appearance - oriented to person, place, and time and acyanotic, in no respiratory distress  Mental status - alert, oriented to person, place, and time, affect appropriate to mood  Lymphatics - no palpable lymphadenopathy  Chest - no tachypnea, retractions or cyanosis  Heart - S1 and S2 normal  Abdomen - no rebound tenderness noted  Back exam - limited range of motion  Neurological - motor and sensory grossly normal bilaterally  Musculoskeletal - osteoarthritic changes noted in both hands  Extremities - intact peripheral pulses    Results  Results for orders placed or performed during the hospital encounter of 02/14/19   CARDIAC PROCEDURE   Result Value Ref Range    EDP 13        ASSESSMENT and PLAN    ICD-10-CM ICD-9-CM    1. Essential hypertension I10 401.9    2. Allergic state, initial encounter T78.40XA 995.3 cetirizine (ZYRTEC) 10 mg tablet   3. Arthralgia, unspecified joint M25.50 719.40    4. Screen for colon cancer Z12.11 V76.51 REFERRAL TO COLON AND RECTAL SURGERY     lab results and schedule of future lab studies reviewed with patient  reviewed diet, exercise and weight control  reviewed medications and side effects in detail      I have discussed the diagnosis with the patient and the intended plan of care as seen in the above orders. The patient has received an after-visit summary and questions were answered concerning future plans. I have discussed medication, side effects, and warnings with the patient in detail. The patient verbalized understanding and is in agreement with the plan of care.  The patient will contact the office with any additional concerns.     Caitlin Maharaj MD

## 2019-09-11 ENCOUNTER — OFFICE VISIT (OUTPATIENT)
Dept: FAMILY MEDICINE CLINIC | Age: 69
End: 2019-09-11

## 2019-09-11 ENCOUNTER — HOSPITAL ENCOUNTER (OUTPATIENT)
Dept: LAB | Age: 69
Discharge: HOME | End: 2019-09-11
Payer: MEDICARE

## 2019-09-11 VITALS
BODY MASS INDEX: 27.9 KG/M2 | OXYGEN SATURATION: 98 % | WEIGHT: 206 LBS | HEART RATE: 63 BPM | SYSTOLIC BLOOD PRESSURE: 143 MMHG | RESPIRATION RATE: 16 BRPM | HEIGHT: 72 IN | TEMPERATURE: 97 F | DIASTOLIC BLOOD PRESSURE: 77 MMHG

## 2019-09-11 DIAGNOSIS — Z71.89 ACP (ADVANCE CARE PLANNING): ICD-10-CM

## 2019-09-11 DIAGNOSIS — Z12.5 SCREENING PSA (PROSTATE SPECIFIC ANTIGEN): ICD-10-CM

## 2019-09-11 DIAGNOSIS — E78.5 DYSLIPIDEMIA: ICD-10-CM

## 2019-09-11 DIAGNOSIS — I10 ESSENTIAL HYPERTENSION: ICD-10-CM

## 2019-09-11 DIAGNOSIS — G62.9 NEUROPATHY: ICD-10-CM

## 2019-09-11 DIAGNOSIS — M25.50 ARTHRALGIA, UNSPECIFIED JOINT: ICD-10-CM

## 2019-09-11 DIAGNOSIS — I10 ESSENTIAL HYPERTENSION: Primary | ICD-10-CM

## 2019-09-11 DIAGNOSIS — M26.621 TMJ TENDERNESS, RIGHT: ICD-10-CM

## 2019-09-11 DIAGNOSIS — Z00.00 ENCOUNTER FOR MEDICARE ANNUAL WELLNESS EXAM: ICD-10-CM

## 2019-09-11 DIAGNOSIS — Z23 ENCOUNTER FOR IMMUNIZATION: ICD-10-CM

## 2019-09-11 LAB
ALBUMIN SERPL-MCNC: 4.1 G/DL (ref 3.4–5)
ALBUMIN/GLOB SERPL: 1.4 {RATIO} (ref 0.8–1.7)
ALP SERPL-CCNC: 82 U/L (ref 45–117)
ALT SERPL-CCNC: 12 U/L (ref 16–61)
ANION GAP SERPL CALC-SCNC: 4 MMOL/L (ref 3–18)
AST SERPL-CCNC: 17 U/L (ref 10–38)
BASOPHILS # BLD: 0.1 K/UL (ref 0–0.1)
BASOPHILS NFR BLD: 1 % (ref 0–2)
BILIRUB SERPL-MCNC: 0.5 MG/DL (ref 0.2–1)
BUN SERPL-MCNC: 18 MG/DL (ref 7–18)
BUN/CREAT SERPL: 19 (ref 12–20)
CALCIUM SERPL-MCNC: 8.8 MG/DL (ref 8.5–10.1)
CHLORIDE SERPL-SCNC: 107 MMOL/L (ref 100–111)
CO2 SERPL-SCNC: 28 MMOL/L (ref 21–32)
CREAT SERPL-MCNC: 0.95 MG/DL (ref 0.6–1.3)
DIFFERENTIAL METHOD BLD: ABNORMAL
EOSINOPHIL # BLD: 0.1 K/UL (ref 0–0.4)
EOSINOPHIL NFR BLD: 1 % (ref 0–5)
ERYTHROCYTE [DISTWIDTH] IN BLOOD BY AUTOMATED COUNT: 12 % (ref 11.6–14.5)
GLOBULIN SER CALC-MCNC: 3 G/DL (ref 2–4)
GLUCOSE SERPL-MCNC: 87 MG/DL (ref 74–99)
HCT VFR BLD AUTO: 44.5 % (ref 36–48)
HGB BLD-MCNC: 14.3 G/DL (ref 13–16)
LYMPHOCYTES # BLD: 1.3 K/UL (ref 0.9–3.6)
LYMPHOCYTES NFR BLD: 28 % (ref 21–52)
MCH RBC QN AUTO: 27.7 PG (ref 24–34)
MCHC RBC AUTO-ENTMCNC: 32.1 G/DL (ref 31–37)
MCV RBC AUTO: 86.2 FL (ref 74–97)
MONOCYTES # BLD: 0.7 K/UL (ref 0.05–1.2)
MONOCYTES NFR BLD: 14 % (ref 3–10)
NEUTS SEG # BLD: 2.7 K/UL (ref 1.8–8)
NEUTS SEG NFR BLD: 56 % (ref 40–73)
PLATELET # BLD AUTO: 216 K/UL (ref 135–420)
PMV BLD AUTO: 10.8 FL (ref 9.2–11.8)
POTASSIUM SERPL-SCNC: 4.2 MMOL/L (ref 3.5–5.5)
PROT SERPL-MCNC: 7.1 G/DL (ref 6.4–8.2)
RBC # BLD AUTO: 5.16 M/UL (ref 4.7–5.5)
SODIUM SERPL-SCNC: 139 MMOL/L (ref 136–145)
WBC # BLD AUTO: 4.8 K/UL (ref 4.6–13.2)

## 2019-09-11 PROCEDURE — 85025 COMPLETE CBC W/AUTO DIFF WBC: CPT

## 2019-09-11 PROCEDURE — 80053 COMPREHEN METABOLIC PANEL: CPT

## 2019-09-11 PROCEDURE — 84153 ASSAY OF PSA TOTAL: CPT

## 2019-09-11 PROCEDURE — 80061 LIPID PANEL: CPT

## 2019-09-11 NOTE — PROGRESS NOTES
Chief Complaint   Patient presents with   tna Annual Wellness Visit     1. Have you been to the ER, urgent care clinic since your last visit? Hospitalized since your last visit? No    2. Have you seen or consulted any other health care providers outside of the [address] [place] [address] since your last visit? Include any pap smears or colon screening. No    This is the Subsequent Medicare Annual Wellness Exam, performed 12 months or more after the Initial AWV or the last Subsequent AWV    I have reviewed the patient's medical history in detail and updated the computerized patient record. History   Nette Garza comes in for medicare wellness exam.    Past Medical History:   Diagnosis Date    Aortic valve insufficiency     Arthritis     Hypertension     Other ill-defined conditions(799.89)     murmer      Past Surgical History:   Procedure Laterality Date    HX APPENDECTOMY  1961    HX OTHER SURGICAL Left 1990's    Exc. mass foot     HX OTHER SURGICAL Right 1980    release Dequervain's contracture    HX OTHER SURGICAL Left 2014    Removal large nevi behind ear     Current Outpatient Medications   Medication Sig Dispense Refill    gabapentin (NEURONTIN) 400 mg capsule Take 1 Cap by mouth three (3) times daily. 270 Cap 0    benazepril (LOTENSIN) 40 mg tablet TAKE 1 TABLET BY MOUTH  DAILY 90 Tab 1    amLODIPine (NORVASC) 10 mg tablet TAKE 1 TABLET BY MOUTH  DAILY 90 Tab 1    atorvastatin (LIPITOR) 40 mg tablet TAKE 1 TABLET BY MOUTH  DAILY 90 Tab 1    finasteride (PROSCAR) 5 mg tablet Take 1 Tab by mouth daily. 90 Tab 3    travoprost (TRAVATAN Z) 0.004 % ophthalmic solution Administer 1 Drop to both eyes nightly.  acetaminophen (TYLENOL EXTRA STRENGTH) 500 mg tablet Take 1,000 mg by mouth every six (6) hours as needed for Pain.  cetirizine (ZYRTEC) 10 mg tablet Take 1 Tab by mouth daily.  80 Tab 0     No Known Allergies  Family History   Problem Relation Age of Onset    No Known Problems Mother     No Known Problems Father      Social History     Tobacco Use    Smoking status: Former Smoker     Last attempt to quit: 1980     Years since quittin.7    Smokeless tobacco: Never Used   Substance Use Topics    Alcohol use: No     Patient Active Problem List   Diagnosis Code    History of colon polyps Z86.010    Family history of colon cancer Z80.0    Benign prostatic hyperplasia with incomplete bladder emptying N40.1, R39.14    Lower urinary tract symptoms (LUTS) R39.9    Essential hypertension I10    Polyp of colon K63.5    Dyslipidemia E78.5    Abnormal EKG R94.31    Chest pain R07.9       Depression Risk Factor Screening:     3 most recent PHQ Screens 2019   Little interest or pleasure in doing things Not at all   Feeling down, depressed, irritable, or hopeless Not at all   Total Score PHQ 2 0     Alcohol Risk Factor Screening: You do not drink alcohol or very rarely. Functional Ability and Level of Safety:   1. Was the patient's timed Up and GO test unsteady or longer than 30 seconds? Yes  2. Does the patient need help with the phone, transportation, shopping, preparing meals, housework, laundry, medications or managing money? No  3. Does the patients' home have rugs in the hallway, lack grab bars in the bathroom, lack handrails on the stairs or have poor lighting? No  4. Have you noticed any hearing difficulties? No  Hearing Evaluation:    Hearing Loss  Hearing is good. Activities of Daily Living  The home contains: no safety equipment. Patient does total self care    Fall Risk  Fall Risk Assessment, last 12 mths 2019   Able to walk? Yes   Fall in past 12 months? Yes   Fall with injury?  No   Number of falls in past 12 months 1   Fall Risk Score 1       Abuse Screen  Patient is not abused    Cognitive Screening   Evaluation of Cognitive Function:  Has your family/caregiver stated any concerns about your memory: no  Normal    Patient Care Team   Patient Care Team:  Javed Winn MD as PCP - General (Family Practice)  Tammy Espinosa MD as Physician (Urology)  Bethanyjorje Chaparro MD as Physician (Cardiology)    Assessment/Plan   Education and counseling provided:  Are appropriate based on today's review and evaluation  Pneumococcal Vaccine  Prostate cancer screening tests (PSA, covered annually)  Colorectal cancer screening tests    1. Encounter for Medicare annual wellness exam    2. ACP (advance care planning)    3. Encounter for immunization  - INFLUENZA VACCINE INACTIVATED (IIV), SUBUNIT, ADJUVANTED, IM  - TETANUS, DIPHTHERIA TOXOIDS AND ACELLULAR PERTUSSIS VACCINE (TDAP), IN INDIVIDS. >=7, IM  - ADMIN INFLUENZA VIRUS VAC  - TX IMMUNIZ ADMIN,1 SINGLE/COMB VAC/TOXOID    4. Screening PSA (prostate specific antigen)  - PSA SCREENING (SCREENING); Future  - COLLECTION VENOUS BLOOD,VENIPUNCTURE    Health Maintenance Due   Topic Date Due    DTaP/Tdap/Td series (1 - Tdap) 01/15/1970    Shingrix Vaccine Age 50> (1 of 2) 01/15/1999    COLONOSCOPY  04/18/2019    Influenza Age 9 to Adult  08/01/2019    MEDICARE YEARLY EXAM  09/12/2019     I have discussed the diagnosis with the patient and the intended plan of care as seen in the above orders. The patient has received an after-visit summary and questions were answered concerning future plans. I have discussed medication, side effects, and warnings with the patient in detail. The patient verbalized understanding and is in agreement with the plan of care. The patient will contact the office with any additional concerns. Personalized preventative plan of care was discussed, printed and given to patient.     Marquise Rose MD

## 2019-09-11 NOTE — PATIENT INSTRUCTIONS
Medicare Part B Preventive Services Limitations Recommendation Scheduled   Bone Mass Measurement  (age 72 & older, biennial) Requires diagnosis related to osteoporosis or estrogen deficiency. Biennial benefit unless patient has history of long-term glucocorticoid tx or baseline is needed because initial test was by other method n/a    Cardiovascular Screening Blood Tests (every 5 years)  Total cholesterol, HDL, Triglycerides and ECG Order blood work  as a panel if possible and  for adults with routine risk  an electrocardiogram (ECG) at intervals determined by the provider. Due     Colorectal Cancer Screening  -Fecal occult blood test (annual)  -Flexible sigmoidoscopy (5y)  -Screening colonoscopy (10y)  -Barium Enema Colorectal cancer screening should be done for adults age 54-65 with no increased risk factors for colorectal cancer. There are a number of acceptable methods of screening for this type of cancer. Each test has its own benefits and drawbacks. Discuss with your provider what is most appropriate for you during your annual wellness visit.  The different tests include: colonoscopy (considered the best screening method), a fecal occult blood test, a fecal DNA test, and sigmoidoscopy Due     Counseling to Prevent Tobacco Use (up to 8 sessions per year)  - Counseling greater than 3 and up to 10 minutes  - Counseling greater than 10 minutes Patients must be asymptomatic of tobacco-related conditions to receive as preventive service n/a    Diabetes Screening Tests (at least every 3 years, Medicare covers annually or at 6-month intervals for prediabetic patients)    Fasting blood sugar (FBS) or glucose tolerance test (GTT) All adults age 38-68 who are overweight should have a diabetes screening test once every three years.   -Other screening tests & preventive services for persons with diabetes include: an eye exam to screen for diabetic retinopathy, a kidney function test, a foot exam, and stricter control over your cholesterol. n/a    Diabetes Self-Management Training (DSMT) (no USPSTF recommendation) Requires referral by treating physician for patient with diabetes or renal disease. 10 hours of initial DSMT session of no less than 30 minutes each in a continuous 12-month period. 2 hours of follow-up DSMT in subsequent years. n/a    Glaucoma Screening (no USPSTF recommendation) Diabetes mellitus, family history, , age 48 or over,  American, age 72 or over utd     Human Immunodeficiency Virus (HIV) Screening (annually for increased risk patients)  HIV-1 and HIV-2 by EIA, CASSI, rapid antibody test, or oral mucosa transudate Patient must be at increased risk for HIV infection per USPSTF guidelines or pregnant. Tests covered annually for patients at increased risk. Pregnant patients may receive up to 3 test during pregnancy. n/a    Medical Nutrition Therapy (MNT) (for diabetes or renal disease not recommended schedule) Requires referral by treating physician for patient with diabetes or renal disease. Can be provided in same year as diabetes self-management training (DSMT), and CMS recommends medical nutrition therapy take place after DSMT. Up to 3 hours for initial year and 2 hours in subsequent years. n/a    Prostate Cancer Screening (annually up to age 76)  - Digital rectal exam (DANIKA)  - Prostate specific antigen (PSA) Annually (age 48 or over), DANIKA not paid separately when covered E/M service is provided on same date  Men up to age 76 may need a screening blood test for prostate cancer at certain intervals, depending on their personal and family history. This decision is between the patient and his provider. Due     Seasonal Influenza Vaccination (annually) All adults should have a flu vaccine yearly  Due       Pneumococcal Vaccination (once after 72) All adults  over age 72 should receive the recommended pneumonia vaccines.  Current USPSTF guidelines recommend a series of two vaccines for the best pneumonia protection. utd     Hepatitis B Vaccinations (if medium/high risk) Medium/high risk factors:  End-stage renal disease,  Hemophiliacs who received Factor VIII or IX concentrates, Clients of institutions for the mentally retarded, Persons who live in the same house as a HepB virus carrier, Homosexual men, Illicit injectable drug abusers. n/a    Shingles Vaccination A shingles vaccine is also recommended once in a lifetime after age 61 Due     Ultrasound Screening for Abdominal Aortic Aneurysm (AAA) (once) An Abdominal Aortic Aneurysm (AAA) Screening is recommended for men age 73-68 who has ever smoked in their lifetime. of the following criteria:  - Men who are 73-68 years old and have smoked more than 100 cigarettes in their lifetime.  -Anyone with a FH of AAA  -Anyone recommended for screening by USPSTF n/a      Hep C All adults born between 80 and 1965 should be screened once for Hepatitis C utd     Tetanus  All adults should have a tetanus vaccine every 10 years Due            Body Mass Index: Care Instructions  Your Care Instructions    Body mass index (BMI) can help you see if your weight is raising your risk for health problems. It uses a formula to compare how much you weigh with how tall you are. · A BMI lower than 18.5 is considered underweight. · A BMI between 18.5 and 24.9 is considered healthy. · A BMI between 25 and 29.9 is considered overweight. A BMI of 30 or higher is considered obese. If your BMI is in the normal range, it means that you have a lower risk for weight-related health problems. If your BMI is in the overweight or obese range, you may be at increased risk for weight-related health problems, such as high blood pressure, heart disease, stroke, arthritis or joint pain, and diabetes.  If your BMI is in the underweight range, you may be at increased risk for health problems such as fatigue, lower protection (immunity) against illness, muscle loss, bone loss, hair loss, and hormone problems. BMI is just one measure of your risk for weight-related health problems. You may be at higher risk for health problems if you are not active, you eat an unhealthy diet, or you drink too much alcohol or use tobacco products. Follow-up care is a key part of your treatment and safety. Be sure to make and go to all appointments, and call your doctor if you are having problems. It's also a good idea to know your test results and keep a list of the medicines you take. How can you care for yourself at home? · Practice healthy eating habits. This includes eating plenty of fruits, vegetables, whole grains, lean protein, and low-fat dairy. · If your doctor recommends it, get more exercise. Walking is a good choice. Bit by bit, increase the amount you walk every day. Try for at least 30 minutes on most days of the week. · Do not smoke. Smoking can increase your risk for health problems. If you need help quitting, talk to your doctor about stop-smoking programs and medicines. These can increase your chances of quitting for good. · Limit alcohol to 2 drinks a day for men and 1 drink a day for women. Too much alcohol can cause health problems. If you have a BMI higher than 25  · Your doctor may do other tests to check your risk for weight-related health problems. This may include measuring the distance around your waist. A waist measurement of more than 40 inches in men or 35 inches in women can increase the risk of weight-related health problems. · Talk with your doctor about steps you can take to stay healthy or improve your health. You may need to make lifestyle changes to lose weight and stay healthy, such as changing your diet and getting regular exercise. If you have a BMI lower than 18.5  · Your doctor may do other tests to check your risk for health problems. · Talk with your doctor about steps you can take to stay healthy or improve your health.  You may need to make lifestyle changes to gain or maintain weight and stay healthy, such as getting more healthy foods in your diet and doing exercises to build muscle. Where can you learn more? Go to http://vazquez-shraddha.info/. Enter S176 in the search box to learn more about \"Body Mass Index: Care Instructions. \"  Current as of: October 13, 2016  Content Version: 11.4  © 4569-4869 [employer]. Care instructions adapted under license by [employer] (which disclaims liability or warranty for this information). If you have questions about a medical condition or this instruction, always ask your healthcare professional. [place] [address] any warranty or liability for your use of this information.

## 2019-09-11 NOTE — PROGRESS NOTES
VASHTI Frost comes in for f/u care. HTN: He is on amlodipine and lotensin. Denies headache,visual changes or focal weakness. Will continue current treatment plan. Dyslipidemia: he is on lipitor and is tolerating. Urology: he has BPH and has been on finasteride. Has yet to follow up with the urologist.  Neuropathy: he is on gabapentin, stable. Continue current treatment plan. Arthralgia: he has knee pain on and off. Also has knee stiffness. He has h/o bilateral knee DJD. Can take tylenol arthritis for pain as needed. Overweight: he has BMI of 27.94. Will do lifestyle and dietary modification. Colon cancer screening: patient will call to reschedule labs. Excoriation: patient fell and sustained excoriation right knee with skin loss. Will give tetanus vaccine. Jaw pain: patient has right TMJ arthritis. He has used ibuprofen 800 mg for this in the past.    Past Medical History  Past Medical History:   Diagnosis Date    Aortic valve insufficiency     Arthritis     Hypertension     Other ill-defined conditions(843.89)     murmer       Surgical History  Past Surgical History:   Procedure Laterality Date    HX APPENDECTOMY  1961    HX OTHER SURGICAL Left 1990's    Exc. mass foot     HX OTHER SURGICAL Right 1980    release Dequervain's contracture    HX OTHER SURGICAL Left 2014    Removal large nevi behind ear        Medications  Current Outpatient Medications   Medication Sig Dispense Refill    gabapentin (NEURONTIN) 400 mg capsule Take 1 Cap by mouth three (3) times daily. 270 Cap 0    benazepril (LOTENSIN) 40 mg tablet TAKE 1 TABLET BY MOUTH  DAILY 90 Tab 1    amLODIPine (NORVASC) 10 mg tablet TAKE 1 TABLET BY MOUTH  DAILY 90 Tab 1    atorvastatin (LIPITOR) 40 mg tablet TAKE 1 TABLET BY MOUTH  DAILY 90 Tab 1    finasteride (PROSCAR) 5 mg tablet Take 1 Tab by mouth daily. 90 Tab 3    travoprost (TRAVATAN Z) 0.004 % ophthalmic solution Administer 1 Drop to both eyes nightly.       acetaminophen (TYLENOL EXTRA STRENGTH) 500 mg tablet Take 1,000 mg by mouth every six (6) hours as needed for Pain. Allergies  No Known Allergies    Family History  Family History   Problem Relation Age of Onset    No Known Problems Mother     No Known Problems Father        Social History  Social History     Socioeconomic History    Marital status:      Spouse name: Not on file    Number of children: Not on file    Years of education: Not on file    Highest education level: Not on file   Occupational History    Not on file   Social Needs    Financial resource strain: Not on file    Food insecurity:     Worry: Not on file     Inability: Not on file    Transportation needs:     Medical: Not on file     Non-medical: Not on file   Tobacco Use    Smoking status: Former Smoker     Last attempt to quit: 1980     Years since quittin.7    Smokeless tobacco: Never Used   Substance and Sexual Activity    Alcohol use: No    Drug use: No     Comment: H/O in the past Marijuana    Sexual activity: Not on file   Lifestyle    Physical activity:     Days per week: Not on file     Minutes per session: Not on file    Stress: Not on file   Relationships    Social connections:     Talks on phone: Not on file     Gets together: Not on file     Attends Caodaism service: Not on file     Active member of club or organization: Not on file     Attends meetings of clubs or organizations: Not on file     Relationship status: Not on file    Intimate partner violence:     Fear of current or ex partner: Not on file     Emotionally abused: Not on file     Physically abused: Not on file     Forced sexual activity: Not on file   Other Topics Concern    Not on file   Social History Narrative    Not on file       Review of Systems  Review of Systems - Review of all systems is negative except as noted above in the HPI.     Vital Signs  Visit Vitals  /83 (BP 1 Location: Left arm, BP Patient Position: Sitting)   Pulse 63   Temp 97 °F (36.1 °C) (Oral)   Resp 16   Ht 6' (1.829 m)   Wt 206 lb (93.4 kg)   SpO2 98%   BMI 27.94 kg/m²         Physical Exam  Physical Examination: General appearance - alert, well appearing, and in no distress, oriented to person, place, and time, overweight, acyanotic, in no respiratory distress and well hydrated  Mental status - alert, oriented to person, place, and time, affect appropriate to mood  Face -patient has right TMJ pain and discomfort to palpation. Neck - supple, no significant adenopathy  Lymphatics - no palpable lymphadenopathy  Chest - clear to auscultation, no wheezes, rales or rhonchi, symmetric air entry  Heart - normal rate and regular rhythm, S1 and S2 normal  Abdomen - soft, nontender, nondistended, no masses or organomegaly  Back exam - limited range of motion  Neurological - alert, oriented, normal speech, no focal findings or movement disorder noted  Musculoskeletal - no muscular tenderness noted  Extremities - no pedal edema noted, intact peripheral pulses    Results  Results for orders placed or performed during the hospital encounter of 02/14/19   CARDIAC PROCEDURE   Result Value Ref Range    EDP 13        ASSESSMENT and PLAN    ICD-10-CM ICD-9-CM    1. Essential hypertension I10 401.9 CBC WITH AUTOMATED DIFF      METABOLIC PANEL, COMPREHENSIVE      COLLECTION VENOUS BLOOD,VENIPUNCTURE   2. Arthralgia, unspecified joint M25.50 719.40    3. Neuropathy G62.9 355.9    4. Dyslipidemia E78.5 272.4 LIPID PANEL      COLLECTION VENOUS BLOOD,VENIPUNCTURE   5. TMJ tenderness, right M26.621 524.62    6. Encounter for immunization Z23 V03.89 INFLUENZA VACCINE INACTIVATED (IIV), SUBUNIT, ADJUVANTED, IM      TETANUS, DIPHTHERIA TOXOIDS AND ACELLULAR PERTUSSIS VACCINE (TDAP), IN INDIVIDS. >=7, IM      ADMIN INFLUENZA VIRUS VAC      OK IMMUNIZ ADMIN,1 SINGLE/COMB VAC/TOXOID   7.  Screening PSA (prostate specific antigen) Z12.5 V76.44 PSA SCREENING (SCREENING)      COLLECTION VENOUS BLOOD,VENIPUNCTURE   8. Encounter for Medicare annual wellness exam Z00.00 V70.0    9. ACP (advance care planning) Z71.89 V65.49    Discussed the patient's BMI with him. The BMI follow up plan is as follows:   dietary management education, guidance, and counseling  encourage exercise  monitor weight  lab results and schedule of future lab studies reviewed with patient  reviewed diet, exercise and weight control  cardiovascular risk and specific lipid/LDL goals reviewed  reviewed medications and side effects in detail    I have discussed the diagnosis with the patient and the intended plan of care as seen in the above orders. The patient has received an after-visit summary and questions were answered concerning future plans. I have discussed medication, side effects, and warnings with the patient in detail. The patient verbalized understanding and is in agreement with the plan of care. The patient will contact the office with any additional concerns. Tawnya Guallpa MD    PLEASE NOTE:   This document has been produced using voice recognition software.  Unrecognized errors in transcription may be present

## 2019-09-11 NOTE — ACP (ADVANCE CARE PLANNING)
Advance Care Planning (ACP) Provider Note - Comprehensive     Date of ACP Conversation: 09/11/19  Persons included in Conversation:  patient  Length of ACP Conversation in minutes:  16 minutes    Authorized Decision Maker (if patient is incapable of making informed decisions): This person is:  wife. General ACP for ALL Patients with Decision Making Capacity:   Importance of advance care planning, including choosing a healthcare agent to communicate patient's healthcare decisions if patient lost the ability to make decisions, such as after a sudden illness or accident  Understanding of the healthcare agent role was assessed and information provided  Exploration of values, goals, and preferences if recovery is not expected, even with continued medical treatment in the event of: Imminent death  Severe, permanent brain injury  \"In these circumstances, what matters most to you? \"  Care focused more on comfort or quality of life.   Opportunity offered to explore how cultural, Zoroastrianism, spiritual, or personal beliefs would affect decisions for future care     Interventions Provided:  Recommended completion of Advance Directive form after review of ACP materials and conversation with prospective healthcare agent      Alma Young MD

## 2019-09-12 LAB
CHOLEST SERPL-MCNC: 136 MG/DL
HDLC SERPL-MCNC: 63 MG/DL (ref 40–60)
HDLC SERPL: 2.2 {RATIO} (ref 0–5)
LDLC SERPL CALC-MCNC: 64.6 MG/DL (ref 0–100)
LIPID PROFILE,FLP: ABNORMAL
PSA SERPL-MCNC: 1.9 NG/ML (ref 0–4)
TRIGL SERPL-MCNC: 42 MG/DL (ref ?–150)
VLDLC SERPL CALC-MCNC: 8.4 MG/DL

## 2019-09-25 ENCOUNTER — OFFICE VISIT (OUTPATIENT)
Dept: CARDIOLOGY CLINIC | Age: 69
End: 2019-09-25

## 2019-09-25 VITALS
SYSTOLIC BLOOD PRESSURE: 153 MMHG | WEIGHT: 205 LBS | DIASTOLIC BLOOD PRESSURE: 74 MMHG | HEART RATE: 71 BPM | HEIGHT: 72 IN | BODY MASS INDEX: 27.77 KG/M2

## 2019-09-25 DIAGNOSIS — I25.10 MILD CAD: ICD-10-CM

## 2019-09-25 DIAGNOSIS — I35.1 NONRHEUMATIC AORTIC VALVE INSUFFICIENCY: Primary | ICD-10-CM

## 2019-09-25 DIAGNOSIS — E78.5 DYSLIPIDEMIA: ICD-10-CM

## 2019-09-25 DIAGNOSIS — I10 ESSENTIAL HYPERTENSION: ICD-10-CM

## 2019-09-25 DIAGNOSIS — R94.31 ABNORMAL EKG: ICD-10-CM

## 2019-09-25 NOTE — PATIENT INSTRUCTIONS
High Blood Pressure: Care Instructions  Overview    It's normal for blood pressure to go up and down throughout the day. But if it stays up, you have high blood pressure. Another name for high blood pressure is hypertension. Despite what a lot of people think, high blood pressure usually doesn't cause headaches or make you feel dizzy or lightheaded. It usually has no symptoms. But it does increase your risk of stroke, heart attack, and other problems. You and your doctor will talk about your risks of these problems based on your blood pressure. Your doctor will give you a goal for your blood pressure. Your goal will be based on your health and your age. Lifestyle changes, such as eating healthy and being active, are always important to help lower blood pressure. You might also take medicine to reach your blood pressure goal.  Follow-up care is a key part of your treatment and safety. Be sure to make and go to all appointments, and call your doctor if you are having problems. It's also a good idea to know your test results and keep a list of the medicines you take. How can you care for yourself at home? Medical treatment  · If you stop taking your medicine, your blood pressure will go back up. You may take one or more types of medicine to lower your blood pressure. Be safe with medicines. Take your medicine exactly as prescribed. Call your doctor if you think you are having a problem with your medicine. · Talk to your doctor before you start taking aspirin every day. Aspirin can help certain people lower their risk of a heart attack or stroke. But taking aspirin isn't right for everyone, because it can cause serious bleeding. · See your doctor regularly. You may need to see the doctor more often at first or until your blood pressure comes down. · If you are taking blood pressure medicine, talk to your doctor before you take decongestants or anti-inflammatory medicine, such as ibuprofen.  Some of these medicines can raise blood pressure. · Learn how to check your blood pressure at home. Lifestyle changes  · Stay at a healthy weight. This is especially important if you put on weight around the waist. Losing even 10 pounds can help you lower your blood pressure. · If your doctor recommends it, get more exercise. Walking is a good choice. Bit by bit, increase the amount you walk every day. Try for at least 30 minutes on most days of the week. You also may want to swim, bike, or do other activities. · Avoid or limit alcohol. Talk to your doctor about whether you can drink any alcohol. · Try to limit how much sodium you eat to less than 2,300 milligrams (mg) a day. Your doctor may ask you to try to eat less than 1,500 mg a day. · Eat plenty of fruits (such as bananas and oranges), vegetables, legumes, whole grains, and low-fat dairy products. · Lower the amount of saturated fat in your diet. Saturated fat is found in animal products such as milk, cheese, and meat. Limiting these foods may help you lose weight and also lower your risk for heart disease. · Do not smoke. Smoking increases your risk for heart attack and stroke. If you need help quitting, talk to your doctor about stop-smoking programs and medicines. These can increase your chances of quitting for good. When should you call for help? Call  911 anytime you think you may need emergency care. This may mean having symptoms that suggest that your blood pressure is causing a serious heart or blood vessel problem. Your blood pressure may be over 180/120.   For example, call  911 if:    · You have symptoms of a heart attack. These may include:  ? Chest pain or pressure, or a strange feeling in the chest.  ? Sweating. ? Shortness of breath. ? Nausea or vomiting. ? Pain, pressure, or a strange feeling in the back, neck, jaw, or upper belly or in one or both shoulders or arms. ? Lightheadedness or sudden weakness.   ? A fast or irregular heartbeat.     · You have symptoms of a stroke. These may include:  ? Sudden numbness, tingling, weakness, or loss of movement in your face, arm, or leg, especially on only one side of your body. ? Sudden vision changes. ? Sudden trouble speaking. ? Sudden confusion or trouble understanding simple statements. ? Sudden problems with walking or balance. ? A sudden, severe headache that is different from past headaches.     · You have severe back or belly pain.    Do not wait until your blood pressure comes down on its own. Get help right away.   Call your doctor now or seek immediate care if:    · Your blood pressure is much higher than normal (such as 180/120 or higher), but you don't have symptoms.     · You think high blood pressure is causing symptoms, such as:  ? Severe headache.  ? Blurry vision.    Watch closely for changes in your health, and be sure to contact your doctor if:    · Your blood pressure measures higher than your doctor recommends at least 2 times. That means the top number is higher or the bottom number is higher, or both.     · You think you may be having side effects from your blood pressure medicine. Where can you learn more? Go to http://vazquez-shraddha.info/. Enter D757 in the search box to learn more about \"High Blood Pressure: Care Instructions. \"  Current as of: April 9, 2019  Content Version: 12.2  © 2495-6481 [employer]. Care instructions adapted under license by [employer] (which disclaims liability or warranty for this information). If you have questions about a medical condition or this instruction, always ask your healthcare professional. [address] any warranty or liability for your use of this information.

## 2019-09-25 NOTE — PROGRESS NOTES
1. Have you been to the ER, urgent care clinic since your last visit? Hospitalized since your last visit?     no    2. Have you seen or consulted any other health care providers outside of the [address] [place] [address] since your last visit? Include any pap smears or colon screening. No     3. Since your last visit, have you had any of the following symptoms?  no

## 2019-09-25 NOTE — PROGRESS NOTES
HISTORY OF PRESENT ILLNESS  Nury Miller is a 78 y.o. male. Hypertension   The history is provided by the patient. This is a chronic problem. The problem occurs every several days. The problem has not changed since onset. Associated symptoms include chest pain. Chest Pain (Angina)    The history is provided by the patient. The problem occurs rarely. Associated symptoms include malaise/fatigue. Pertinent negatives include no claudication, no cough, no diaphoresis, no dizziness, no fever, no hemoptysis, no nausea, no orthopnea, no palpitations, no PND, no sputum production, no vomiting and no weakness. Risk factors include hypertension. His past medical history is significant for HTN. Procedural history includes cardiac catheterization. Pertinent negatives include no echocardiogram and no stress thallium. Review of Systems   Constitutional: Positive for malaise/fatigue. Negative for chills, diaphoresis, fever and weight loss. HENT: Negative for congestion, ear discharge, ear pain, hearing loss, nosebleeds and tinnitus. Eyes: Negative for blurred vision. Respiratory: Negative for cough, hemoptysis, sputum production, wheezing and stridor. Cardiovascular: Positive for chest pain. Negative for palpitations, orthopnea, claudication, leg swelling and PND. Gastrointestinal: Negative for heartburn, nausea and vomiting. Musculoskeletal: Negative for myalgias and neck pain. Skin: Negative for itching and rash. Neurological: Negative for dizziness, tingling, tremors, focal weakness, loss of consciousness and weakness. Psychiatric/Behavioral: Negative for depression and suicidal ideas.      Family History   Problem Relation Age of Onset    No Known Problems Mother     No Known Problems Father        Past Medical History:   Diagnosis Date    Aortic valve insufficiency     Arthritis     Hypertension     Other ill-defined conditions(119.55)     murmer       Past Surgical History:   Procedure Laterality Date    HX APPENDECTOMY      HX OTHER SURGICAL Left     Exc. mass foot     HX OTHER SURGICAL Right     release Dequervain's contracture    HX OTHER SURGICAL Left     Removal large nevi behind ear       Social History     Tobacco Use    Smoking status: Former Smoker     Last attempt to quit: 1980     Years since quittin.7    Smokeless tobacco: Never Used   Substance Use Topics    Alcohol use: No       No Known Allergies    Outpatient Medications Marked as Taking for the 19 encounter (Office Visit) with Enrico Lundberg MD   Medication Sig Dispense Refill    gabapentin (NEURONTIN) 400 mg capsule Take 1 Cap by mouth three (3) times daily. 270 Cap 0    benazepril (LOTENSIN) 40 mg tablet TAKE 1 TABLET BY MOUTH  DAILY 90 Tab 1    amLODIPine (NORVASC) 10 mg tablet TAKE 1 TABLET BY MOUTH  DAILY 90 Tab 1    atorvastatin (LIPITOR) 40 mg tablet TAKE 1 TABLET BY MOUTH  DAILY 90 Tab 1    finasteride (PROSCAR) 5 mg tablet Take 1 Tab by mouth daily. 90 Tab 3    travoprost (TRAVATAN Z) 0.004 % ophthalmic solution Administer 1 Drop to both eyes nightly.  acetaminophen (TYLENOL EXTRA STRENGTH) 500 mg tablet Take 1,000 mg by mouth every six (6) hours as needed for Pain. Visit Vitals  /74   Pulse 71   Ht 6' (1.829 m)   Wt 93 kg (205 lb)   BMI 27.80 kg/m²     Physical Exam   Constitutional: He is oriented to person, place, and time. He appears well-developed and well-nourished. No distress. HENT:   Head: Atraumatic. Mouth/Throat: No oropharyngeal exudate. Eyes: Conjunctivae are normal. Right eye exhibits no discharge. Left eye exhibits no discharge. No scleral icterus. Neck: Normal range of motion. Neck supple. No JVD present. No tracheal deviation present. No thyromegaly present. Cardiovascular: Normal rate and regular rhythm. Exam reveals no gallop.    Murmur (2/6 systolic murmur best heard at apex/ aortic area with no radiation) heard.  Pulmonary/Chest: Effort normal and breath sounds normal. No stridor. He has no wheezes. He has no rales. Abdominal: Soft. There is no tenderness. There is no rebound. Musculoskeletal: Normal range of motion. He exhibits no edema or tenderness. Lymphadenopathy:     He has no cervical adenopathy. Neurological: He is alert and oriented to person, place, and time. He exhibits normal muscle tone. Skin: Skin is warm. He is not diaphoretic. Psychiatric: He has a normal mood and affect. His behavior is normal.     ekg sinus rhythm with t wave inversion in anterior leads. 10/15/18   ECHO ADULT COMPLETE 10/19/2018 10/19/2018    Narrative · Normal left ventricular cavity size and systolic function (ejection   fraction normal). Moderate concentric hypertrophy observed. The estimated   ejection fraction is 61 - 65%. No regional wall motion abnormality noted. There is mild (grade 1) left ventricular diastolic dysfunction. · Normal right ventricular size and function. · The left atrial cavity size is mildly dilated. LA index is 35.03 ml/m2. · The right atiral cavity size is mildly dilated. · Echogenicity visualized on the right coronary cusp of the aortic valve,   cannot rule out healed vegetation vs. calcification. Moderate aortic valve   regurgitation with an eccentrically directed jet. · There is mild leaflet thickening of the mitral valve leaflets with mild   mitral annular calcification and mild regurgitation. · Mild tricuspid valve regurgitation. Pulmonary arterial systolic pressure   is 39 mmHg. · Mild pulmonic regurgitation. · The aortic root is mildly dilated. · Mild aortic valve sclerosis. Echogenicity visualized on the right   coronary cusp, cannot rule out calcification. Moderate aortic valve   regurgitation is present. · Pulmonary arterial systolic pressure is 58.5 mmHg. · Pulmonary arterial systolic pressure is 17.9 mmHg.         Signed by: Bouchra Pena MD     Lexiscan : no ischemia  ASSESSMENT and PLAN    ICD-10-CM ICD-9-CM    1. Nonrheumatic aortic valve insufficiency I35.1 424.1 ECHO ADULT COMPLETE   2. Dyslipidemia E78.5 272.4    3. Essential hypertension I10 401.9    4. Abnormal EKG R94.31 794.31    5. Mild CAD I25.10 414.00      No orders of the defined types were placed in this encounter. Follow-up and Dispositions    · Return in about 6 months (around 3/25/2020). current treatment plan is effective, no change in therapy  use of aspirin to prevent MI and TIA's discussed. Patient with risk factors seen for atypical chest pain.  ekg is abnormal with concerns for ischemia  No ischemia on stress test  Normal LV function with mild to moderate aortic regurgitation. No vegetation noted on the aortic valve. Underwent recent cardiac cath - mild CAD with normal LVEF. Continue intense risk factor modification. Aspirin 81mg daily  Currently on optimal medical therapy. Will obtain echocardiogram prior to next appointment to assess moderate aortic regurgitation.

## 2019-09-30 DIAGNOSIS — R39.14 BENIGN PROSTATIC HYPERPLASIA WITH INCOMPLETE BLADDER EMPTYING: ICD-10-CM

## 2019-09-30 DIAGNOSIS — N40.1 BENIGN PROSTATIC HYPERPLASIA WITH INCOMPLETE BLADDER EMPTYING: ICD-10-CM

## 2019-10-01 RX ORDER — FINASTERIDE 5 MG/1
TABLET, FILM COATED ORAL
Qty: 90 TAB | Refills: 3 | Status: SHIPPED | OUTPATIENT
Start: 2019-10-01 | End: 2020-10-28

## 2019-10-21 ENCOUNTER — OFFICE VISIT (OUTPATIENT)
Dept: UROLOGY | Age: 69
End: 2019-10-21

## 2019-10-21 VITALS
SYSTOLIC BLOOD PRESSURE: 130 MMHG | DIASTOLIC BLOOD PRESSURE: 70 MMHG | HEIGHT: 72 IN | HEART RATE: 72 BPM | OXYGEN SATURATION: 98 % | WEIGHT: 206 LBS | BODY MASS INDEX: 27.9 KG/M2

## 2019-10-21 DIAGNOSIS — N40.1 BPH ASSOCIATED WITH NOCTURIA: Primary | ICD-10-CM

## 2019-10-21 DIAGNOSIS — R35.1 BPH ASSOCIATED WITH NOCTURIA: Primary | ICD-10-CM

## 2019-10-21 DIAGNOSIS — N52.01 ERECTILE DYSFUNCTION DUE TO ARTERIAL INSUFFICIENCY: ICD-10-CM

## 2019-10-21 LAB
BILIRUB UR QL STRIP: NEGATIVE
GLUCOSE UR-MCNC: NEGATIVE MG/DL
KETONES P FAST UR STRIP-MCNC: NEGATIVE MG/DL
PH UR STRIP: 6 [PH] (ref 4.6–8)
PROT UR QL STRIP: NEGATIVE
SP GR UR STRIP: 1.01 (ref 1–1.03)
UA UROBILINOGEN AMB POC: NORMAL (ref 0.2–1)
URINALYSIS CLARITY POC: CLEAR
URINALYSIS COLOR POC: YELLOW
URINE BLOOD POC: NEGATIVE
URINE LEUKOCYTES POC: NEGATIVE
URINE NITRITES POC: NEGATIVE

## 2019-10-21 NOTE — PATIENT INSTRUCTIONS
Benign Prostatic Hyperplasia: Care Instructions  Your Care Instructions    Benign prostatic hyperplasia, or BPH, is an enlarged prostate gland. The prostate is a small gland that makes some of the fluid in semen. Prostate enlargement happens to almost all men as they age. It is usually not serious. BPH does not cause prostate cancer. As the prostate gets bigger, it may partly block the flow of urine. You may have a hard time getting a urine stream started or completely stopped. BPH can cause dribbling. You may have a weak urine stream, or you may have to urinate more often than you used to, especially at night. Most men find these problems easy to manage. You do not need treatment unless your symptoms bother you a lot or you have other problems, such as bladder infections or stones. In these cases, medicines may help. Surgery is not needed unless the urine flow is blocked or the symptoms do not get better with medicine. Follow-up care is a key part of your treatment and safety. Be sure to make and go to all appointments, and call your doctor if you are having problems. It's also a good idea to know your test results and keep a list of the medicines you take. How can you care for yourself at home? · Take plenty of time to urinate. Try to relax. · Try \"double voiding. \" Urinate as much you can, relax for a few moments, and then try to urinate again. · Sit on the toilet to urinate. · Read or think of other things while you are waiting. · Turn on a faucet, or try to picture running water. Some men find that this helps get their urine flowing. · If dribbling is a problem, wash your penis daily to avoid skin irritation and infection. · Avoid caffeine and alcohol. These drinks will increase how often you need to urinate. Spread your fluid intake throughout the day. If the urge to urinate often wakes you at night, limit your fluid intake in the evening. Urinate right before you go to bed.   · Many over-the-counter cold and allergy medicines can make the symptoms of BPH worse. Avoid antihistamines, decongestants, and allergy pills, if you can. Read the warnings on the package. · If you take any prescription medicines, especially tranquilizers or antidepressants, ask your doctor or pharmacist whether they can cause urination problems. There may be other medicines you can use that do not cause urinary problems. · Be safe with medicines. Take your medicines exactly as prescribed. Call your doctor if you think you are having a problem with your medicine. When should you call for help? Call your doctor now or seek immediate medical care if:    · You cannot urinate at all.     · You have symptoms of a urinary infection. For example:  ? You have blood or pus in your urine. ? You have pain in your back just below your rib cage. This is called flank pain. ? You have a fever, chills, or body aches. ? It hurts to urinate. ? You have groin or belly pain.    Watch closely for changes in your health, and be sure to contact your doctor if:    · It hurts when you ejaculate.     · Your urinary problems get a lot worse or bother you a lot. Where can you learn more? Go to http://vazquez-shraddha.info/. Enter R168 in the search box to learn more about \"Benign Prostatic Hyperplasia: Care Instructions. \"  Current as of: May 28, 2019  Content Version: 12.2  © 2086-0859 [employer]. Care instructions adapted under license by [employer] (which disclaims liability or warranty for this information). If you have questions about a medical condition or this instruction, always ask your healthcare professional. [address] any warranty or liability for your use of this information.

## 2019-10-21 NOTE — PROGRESS NOTES
Mr. Sánchez Chand has a reminder for a \"due or due soon\" health maintenance. I have asked that he contact his primary care provider for follow-up on this health maintenance.

## 2019-10-21 NOTE — PROGRESS NOTES
Chief Complaint   Patient presents with    Benign Prostatic Hypertrophy    Nocturia    Urinary Frequency       HISTORY OF PRESENT ILLNESS:  Gio Lentz is a 78 y.o. male who presents today in follow-up to his enlarged prostate and his difficulty with urination. He was here a year ago and at that time he had been on Flomax for a number of years but it had ceased being effective so I switched him over to finasteride and now he is doing much better. His most recent PSA is essentially half what it was a year ago and it is down to 1.9. His biggest complaint is some erectile dysfunction. He tried Cialis over the last year but it just did not seem to work well enough for him. We had a lengthy discussion about alternatives and he actually has used injectable triple mixture but developed some fibrosis and so has quit using that. I told him that his alternatives are to try and continue the oral medication but he wishes to wait at this time and states that both he and his wife are adapting to the new situation a little better.            ROS documented on the chart    Past Medical History:   Diagnosis Date    Aortic valve insufficiency     Arthritis     Hypertension     Other ill-defined conditions(799.89)     murmer       Past Surgical History:   Procedure Laterality Date    HX APPENDECTOMY      HX OTHER SURGICAL Left     Exc. mass foot     HX OTHER SURGICAL Right     release Dequervain's contracture    HX OTHER SURGICAL Left     Removal large nevi behind ear       Social History     Tobacco Use    Smoking status: Former Smoker     Last attempt to quit: 1980     Years since quittin.8    Smokeless tobacco: Never Used   Substance Use Topics    Alcohol use: No    Drug use: No     Comment: H/O in the past Marijuana       No Known Allergies    Family History   Problem Relation Age of Onset    No Known Problems Mother     No Known Problems Father        Current Outpatient Medications   Medication Sig Dispense Refill    finasteride (PROSCAR) 5 mg tablet TAKE 1 TABLET BY MOUTH  DAILY 90 Tab 3    amLODIPine (NORVASC) 10 mg tablet TAKE 1 TABLET BY MOUTH  DAILY 90 Tab 1    atorvastatin (LIPITOR) 40 mg tablet TAKE 1 TABLET BY MOUTH  DAILY 90 Tab 1    benazepril (LOTENSIN) 40 mg tablet TAKE 1 TABLET BY MOUTH  DAILY 90 Tab 1    gabapentin (NEURONTIN) 400 mg capsule Take 1 Cap by mouth three (3) times daily. 270 Cap 0    travoprost (TRAVATAN Z) 0.004 % ophthalmic solution Administer 1 Drop to both eyes nightly.  acetaminophen (TYLENOL EXTRA STRENGTH) 500 mg tablet Take 1,000 mg by mouth every six (6) hours as needed for Pain. Lab Results   Component Value Date/Time    Prostate Specific Ag 1.9 09/11/2019 11:28 AM    Prostate Specific Ag 4.0 02/27/2018 09:37 AM              PHYSICAL EXAMINATION:   Visit Vitals  /70 (BP 1 Location: Left arm, BP Patient Position: Sitting)   Pulse 72   Ht 6' (1.829 m)   Wt 206 lb (93.4 kg)   SpO2 98%   BMI 27.94 kg/m²     Constitutional: WDWN, Pleasant and appropriate affect, No acute distress. CV:  No peripheral swelling noted  Respiratory: No respiratory distress or difficulties  Abdomen:  No abdominal masses or tenderness. No CVA tenderness. No inguinal hernias noted.  Male:    DANIKA:Perineum normal to visual inspection, no erythema or irritation, normal sphincter tone with no rectal lesions. The prostate is about 40 g in size and benign in consistency. SCROTUM:  No scrotal rash or lesions noticed. Normal bilateral testes and epididymis. PENIS: Urethral meatus normal in location and size. No urethral discharge. Skin: No jaundice. Neuro/Psych:  Alert and oriented x 3, affect appropriate. Lymphatic:   No enlarged inguinal lymph nodes.          Results for orders placed or performed in visit on 10/21/19   AMB POC URINALYSIS DIP STICK AUTO W/O MICRO   Result Value Ref Range    Color (UA POC) Yellow     Clarity (UA POC) Clear     Glucose (UA POC) Negative Negative    Bilirubin (UA POC) Negative Negative    Ketones (UA POC) Negative Negative    Specific gravity (UA POC) 1.010 1.001 - 1.035    Blood (UA POC) Negative Negative    pH (UA POC) 6.0 4.6 - 8.0    Protein (UA POC) Negative Negative    Urobilinogen (UA POC) 0.2 mg/dL 0.2 - 1    Nitrites (UA POC) Negative Negative    Leukocyte esterase (UA POC) Negative Negative         REVIEW OF LABS AND IMAGING:     Imaging Report Reviewed? YES     Images Reviewed? YES           Other Lab Data Reviewed? YES  Thank you         ASSESSMENT:     ICD-10-CM ICD-9-CM    1. BPH associated with nocturia N40.1 600.01 AMB POC URINALYSIS DIP STICK AUTO W/O MICRO    R35.1 788.43    2. Erectile dysfunction due to arterial insufficiency N52.01 607.84             PLAN / DISCUSSION: : At this point in time I would like to simply keep him on the finasteride indefinitely. Repeat the PSA and DANIKA on an annual basis. He will let me know if he wants a refill and another trial of Cialis. The patient expresses understanding and agreement of the discussion and plan. Patricia Coon MD on 10/21/2019         Please note:  Voice recognition was used to generate this report, which may have resulted in some phonetic based errors in grammar and contents. Even though attempts were made to correct all the mistakes, some may have been missed, and remained in the body of the document.

## 2019-10-22 ENCOUNTER — OFFICE VISIT (OUTPATIENT)
Dept: FAMILY MEDICINE CLINIC | Age: 69
End: 2019-10-22

## 2019-10-22 VITALS
TEMPERATURE: 98 F | WEIGHT: 206 LBS | HEIGHT: 72 IN | BODY MASS INDEX: 27.9 KG/M2 | DIASTOLIC BLOOD PRESSURE: 70 MMHG | SYSTOLIC BLOOD PRESSURE: 132 MMHG | RESPIRATION RATE: 16 BRPM | HEART RATE: 74 BPM | OXYGEN SATURATION: 98 %

## 2019-10-22 DIAGNOSIS — G89.29 CHRONIC MIDLINE LOW BACK PAIN WITHOUT SCIATICA: ICD-10-CM

## 2019-10-22 DIAGNOSIS — G89.29 CHRONIC PAIN OF RIGHT KNEE: Primary | ICD-10-CM

## 2019-10-22 DIAGNOSIS — M54.50 CHRONIC MIDLINE LOW BACK PAIN WITHOUT SCIATICA: ICD-10-CM

## 2019-10-22 DIAGNOSIS — M25.561 CHRONIC PAIN OF RIGHT KNEE: Primary | ICD-10-CM

## 2019-10-22 DIAGNOSIS — G62.9 NEUROPATHY: ICD-10-CM

## 2019-10-22 DIAGNOSIS — I10 ESSENTIAL HYPERTENSION: ICD-10-CM

## 2019-10-22 RX ORDER — PREGABALIN 100 MG/1
100 CAPSULE ORAL 2 TIMES DAILY
Qty: 60 CAP | Refills: 2 | Status: SHIPPED | OUTPATIENT
Start: 2019-10-22 | End: 2020-03-11 | Stop reason: SDUPTHER

## 2019-10-22 NOTE — PROGRESS NOTES
Chief Complaint   Patient presents with    Knee Pain     right knee      1. Have you been to the ER, urgent care clinic since your last visit? Hospitalized since your last visit? No    2. Have you seen or consulted any other health care providers outside of the [address] [place] [address] since your last visit? Include any pap smears or colon screening. No     HPI  Louie Sos comes in for f/u care. Knee pain: patient has a h/o right knee DJD and OA. Seen by ortho in the past and had knee injections given. Fell and bumped knee a few weeks ago. Since then pain right knee is severe, has swelling on and off. Pain comes with application of weight and walking. Pain mainly around the medial aspect of the knee. Will do knee xray and refer to orthopedist.  Patient can take extra strength Tylenol for pain as needed. I did look at the knee x-ray. There is diminished joint space with features of osteoarthritis. I will await radiologist report. Neuropathy: Patient has a history of neuropathy. Has been on gabapentin. This has also helped with his knee pain and osteoarthritis. Discussed options. Will discontinue gabapentin and put him on Lyrica. HTN: BP is stable, on lotensin and amlodipine. Denies headache, changes in vision or focal weakness. Continue current treatment plan. Back pain: he has chronic low back pain with neuropathy, will give lyrica instead of gabapentin. Patient can continue with the Tylenol as needed for pain. He denies bladder or bowel dysfunction.     Past Medical History  Past Medical History:   Diagnosis Date    Aortic valve insufficiency     Arthritis     Hypertension     Other ill-defined conditions(709.89)     murmer       Surgical History  Past Surgical History:   Procedure Laterality Date    HX APPENDECTOMY  1961    HX OTHER SURGICAL Left 1990's    Exc. mass foot     HX OTHER SURGICAL Right 1980    release Dequervain's contracture    HX OTHER SURGICAL Left 2014    Removal large nevi behind ear        Medications  Current Outpatient Medications   Medication Sig Dispense Refill    pregabalin (LYRICA) 100 mg capsule Take 1 Cap by mouth two (2) times a day. Max Daily Amount: 200 mg. 60 Cap 2    finasteride (PROSCAR) 5 mg tablet TAKE 1 TABLET BY MOUTH  DAILY 90 Tab 3    amLODIPine (NORVASC) 10 mg tablet TAKE 1 TABLET BY MOUTH  DAILY 90 Tab 1    atorvastatin (LIPITOR) 40 mg tablet TAKE 1 TABLET BY MOUTH  DAILY 90 Tab 1    benazepril (LOTENSIN) 40 mg tablet TAKE 1 TABLET BY MOUTH  DAILY 90 Tab 1    travoprost (TRAVATAN Z) 0.004 % ophthalmic solution Administer 1 Drop to both eyes nightly.  acetaminophen (TYLENOL EXTRA STRENGTH) 500 mg tablet Take 1,000 mg by mouth every six (6) hours as needed for Pain.          Allergies  No Known Allergies    Family History  Family History   Problem Relation Age of Onset    No Known Problems Mother     No Known Problems Father        Social History  Social History     Socioeconomic History    Marital status:      Spouse name: Not on file    Number of children: Not on file    Years of education: Not on file    Highest education level: Not on file   Occupational History    Not on file   Social Needs    Financial resource strain: Not on file    Food insecurity:     Worry: Not on file     Inability: Not on file    Transportation needs:     Medical: Not on file     Non-medical: Not on file   Tobacco Use    Smoking status: Former Smoker     Last attempt to quit: 1980     Years since quittin.8    Smokeless tobacco: Never Used   Substance and Sexual Activity    Alcohol use: No    Drug use: No     Comment: H/O in the past Marijuana    Sexual activity: Not on file   Lifestyle    Physical activity:     Days per week: Not on file     Minutes per session: Not on file    Stress: Not on file   Relationships    Social connections:     Talks on phone: Not on file     Gets together: Not on file     Attends Spiritism service: Not on file     Active member of club or organization: Not on file     Attends meetings of clubs or organizations: Not on file     Relationship status: Not on file    Intimate partner violence:     Fear of current or ex partner: Not on file     Emotionally abused: Not on file     Physically abused: Not on file     Forced sexual activity: Not on file   Other Topics Concern    Not on file   Social History Narrative    Not on file       Review of Systems  Review of Systems - Review of all systems is negative except as noted above in the HPI. Vital Signs  Visit Vitals  /70 (BP 1 Location: Left arm, BP Patient Position: Sitting)   Pulse 74   Temp 98 °F (36.7 °C) (Oral)   Resp 16   Ht 6' (1.829 m)   Wt 206 lb (93.4 kg)   SpO2 98%   BMI 27.94 kg/m²         Physical Exam  Physical Examination: General appearance - alert, well appearing, and in no distress, oriented to person, place, and time and acyanotic, in no respiratory distress  Mental status - alert, oriented to person, place, and time, affect appropriate to mood  Chest - clear to auscultation, no wheezes, rales or rhonchi, symmetric air entry  Heart - S1 and S2 normal  Back exam - limited range of motion, pain with motion noted during exam, tenderness noted paralumbar muscles  Neurological - abnormal neurological exam unchanged from prior examinations  Musculoskeletal -right knee with tenderness along the knee margins especially medial aspect of the knee, no swelling, erythema or crepitus. Discomfort to flexion and extension of the knee.   Extremities - intact peripheral pulses    Results  Results for orders placed or performed in visit on 10/21/19   AMB POC URINALYSIS DIP STICK AUTO W/O MICRO   Result Value Ref Range    Color (UA POC) Yellow     Clarity (UA POC) Clear     Glucose (UA POC) Negative Negative    Bilirubin (UA POC) Negative Negative    Ketones (UA POC) Negative Negative    Specific gravity (UA POC) 1.010 1.001 - 1.035    Blood (UA POC) Negative Negative    pH (UA POC) 6.0 4.6 - 8.0    Protein (UA POC) Negative Negative    Urobilinogen (UA POC) 0.2 mg/dL 0.2 - 1    Nitrites (UA POC) Negative Negative    Leukocyte esterase (UA POC) Negative Negative       ASSESSMENT and PLAN    ICD-10-CM ICD-9-CM    1. Chronic pain of right knee M25.561 719.46 REFERRAL TO ORTHOPEDICS    G89.29 338.29 XR KNEE RT MIN 4 V   2. Neuropathy G62.9 355.9 pregabalin (LYRICA) 100 mg capsule   3. Chronic midline low back pain without sciatica M54.5 724.2     G89.29 338.29    4. Essential hypertension I10 401.9      reviewed diet, exercise and weight control  reviewed medications and side effects in detail  radiology results and schedule of future radiology studies reviewed with patient      I have discussed the diagnosis with the patient and the intended plan of care as seen in the above orders. The patient has received an after-visit summary and questions were answered concerning future plans. I have discussed medication, side effects, and warnings with the patient in detail. The patient verbalized understanding and is in agreement with the plan of care. The patient will contact the office with any additional concerns. Robyn Mendoza MD    PLEASE NOTE:   This document has been produced using voice recognition software.  Unrecognized errors in transcription may be present

## 2019-11-06 ENCOUNTER — OFFICE VISIT (OUTPATIENT)
Dept: ORTHOPEDIC SURGERY | Age: 69
End: 2019-11-06

## 2019-11-06 VITALS
OXYGEN SATURATION: 98 % | WEIGHT: 205 LBS | SYSTOLIC BLOOD PRESSURE: 164 MMHG | HEIGHT: 72 IN | BODY MASS INDEX: 27.77 KG/M2 | DIASTOLIC BLOOD PRESSURE: 89 MMHG | RESPIRATION RATE: 16 BRPM | HEART RATE: 69 BPM

## 2019-11-06 DIAGNOSIS — M17.11 LOCALIZED OSTEOARTHRITIS OF RIGHT KNEE: ICD-10-CM

## 2019-11-06 DIAGNOSIS — M25.561 PAIN IN BOTH KNEES, UNSPECIFIED CHRONICITY: Primary | ICD-10-CM

## 2019-11-06 DIAGNOSIS — M17.12 PRIMARY OSTEOARTHRITIS OF LEFT KNEE: ICD-10-CM

## 2019-11-06 DIAGNOSIS — M25.562 PAIN IN BOTH KNEES, UNSPECIFIED CHRONICITY: Primary | ICD-10-CM

## 2019-11-06 RX ORDER — TRIAMCINOLONE ACETONIDE 40 MG/ML
40 INJECTION, SUSPENSION INTRA-ARTICULAR; INTRAMUSCULAR ONCE
Qty: 1 VIAL | Refills: 0
Start: 2019-11-06 | End: 2019-11-06

## 2019-11-06 NOTE — PROGRESS NOTES
Verbal order given by Dr. More Learn to draw up 1 cc of Kenalog and 3 cc of Xylocaine    Injection given in RIGHT knee

## 2019-11-06 NOTE — PROGRESS NOTES
Patient: Pablo Love                MRN: [id number]       SSN: [id number]  YOB: 1950        AGE: 78 y.o. SEX: male  Body mass index is 27.8 kg/m². PCP: Alexander Barnhart MD  11/06/19    Chief Complaint: Bilateral knee pain, right worse than left    HISTORY OF PRESENT ILLNESS:  Cesar Campbell is a very pleasant, 78year-old male who presents to the office today with bilateral knee pain, right worse than left. The right knee pain began after a fall about three months ago. He was walking his dog when another dog attacked his dog and he fell trying to protect his dog. He fell onto his knee. He did have a cut in his anterior knee. Since that time, he has been having mostly medial knee pain. He has also noticed occasional swelling. In the past, he has had Supartz injection, several years ago into what he believes was both knees, but may have only been the right knee, which helped his pain. This pain is similar to the pain he was having before that. Prior to the trauma, he was not having much in the way of pain. Past Medical History:   Diagnosis Date    Aortic valve insufficiency     Arthritis     Hypertension     Other ill-defined conditions(450.64)     murmer       Family History   Problem Relation Age of Onset    No Known Problems Mother     No Known Problems Father        Current Outpatient Medications   Medication Sig Dispense Refill    pregabalin (LYRICA) 100 mg capsule Take 1 Cap by mouth two (2) times a day. Max Daily Amount: 200 mg. 60 Cap 2    finasteride (PROSCAR) 5 mg tablet TAKE 1 TABLET BY MOUTH  DAILY 90 Tab 3    amLODIPine (NORVASC) 10 mg tablet TAKE 1 TABLET BY MOUTH  DAILY 90 Tab 1    atorvastatin (LIPITOR) 40 mg tablet TAKE 1 TABLET BY MOUTH  DAILY 90 Tab 1    benazepril (LOTENSIN) 40 mg tablet TAKE 1 TABLET BY MOUTH  DAILY 90 Tab 1    travoprost (TRAVATAN Z) 0.004 % ophthalmic solution Administer 1 Drop to both eyes nightly.       acetaminophen (TYLENOL EXTRA STRENGTH) 500 mg tablet Take 1,000 mg by mouth every six (6) hours as needed for Pain.          No Known Allergies    Past Surgical History:   Procedure Laterality Date    HX APPENDECTOMY      HX OTHER SURGICAL Left     Exc. mass foot     HX OTHER SURGICAL Right     release Dequervain's contracture    HX OTHER SURGICAL Left     Removal large nevi behind ear       Social History     Socioeconomic History    Marital status:      Spouse name: Not on file    Number of children: Not on file    Years of education: Not on file    Highest education level: Not on file   Occupational History    Not on file   Social Needs    Financial resource strain: Not on file    Food insecurity:     Worry: Not on file     Inability: Not on file    Transportation needs:     Medical: Not on file     Non-medical: Not on file   Tobacco Use    Smoking status: Former Smoker     Last attempt to quit: 1980     Years since quittin.8    Smokeless tobacco: Never Used   Substance and Sexual Activity    Alcohol use: No    Drug use: No     Comment: H/O in the past Marijuana    Sexual activity: Not on file   Lifestyle    Physical activity:     Days per week: Not on file     Minutes per session: Not on file    Stress: Not on file   Relationships    Social connections:     Talks on phone: Not on file     Gets together: Not on file     Attends Protestant service: Not on file     Active member of club or organization: Not on file     Attends meetings of clubs or organizations: Not on file     Relationship status: Not on file    Intimate partner violence:     Fear of current or ex partner: Not on file     Emotionally abused: Not on file     Physically abused: Not on file     Forced sexual activity: Not on file   Other Topics Concern    Not on file   Social History Narrative    Not on file       REVIEW OF SYSTEMS:      CON: negative for recent weight loss/gain, fever, or chills  EYE: negative for double or blurry vision  ENT: negative for hoarseness  RS:   negative for cough, URI, SOB  CV:  negative for chest pain, palpitations  GI:    negative for blood in stool, nausea/vomiting  :  negative for blood in urine  MS: As per HPI  Other systems reviewed and noted below. PHYSICAL EXAMINATION:  Visit Vitals  /89   Pulse 69   Resp 16   Ht 6' (1.829 m)   Wt 205 lb (93 kg)   SpO2 98%   BMI 27.80 kg/m²     Body mass index is 27.8 kg/m². GENERAL: Alert and oriented x3, in no acute distress, well-developed, well-nourished. HEENT: Normocephalic, atraumatic. RESP: Non labored breathing with equal chest rise on inspiration. CV: Well perfused extremities. No cyanosis or clubbing noted. ABDOMEN: Soft, non-tender, non-distended. PHYSICAL EXAM:  Physical exam of the right knee with full knee range of motion, but pain at the extremes of motion. He has a mild effusion. No warmth or erythema. He has a healed abrasion over the anterior knee. He is tender to palpation along the medial joint line. He does have some mild pain with Daisy's. Exam of the left knee with full knee range of motion. Mild tenderness along the medial joint line. No appreciated effusion. No warmth or erythema. IMAGING:  X-rays of both knees were taken in the office today. These show right worse than left arthritis, but no fractures. ASSESSMENT AND PLAN:   Kevin Orellana is a 78year-old male with bilateral knee arthritis. His right knee is more symptomatic today. We will try a cortisone shot to see how he responds. Failing that, the next step may be a repeat of hyaluronic acid injections, which we will put in for if he returns.         [facility] - [address] [facility]  OFFICE PROCEDURE PROGRESS NOTE        Chart reviewed for the following:   Sheila Arceo MD, have reviewed the History, Physical and updated the Allergic reactions for [address] [place] [address] performed immediately prior to start of procedure:   Edilson Mitchell MD, have performed the following reviews on Kaden Holes prior to the start of the procedure:            * Patient was identified by name and date of birth   * Agreement on procedure being performed was verified  * Risks and Benefits explained to the patient  * Procedure site verified and marked as necessary  * Patient was positioned for comfort  * Consent was signed and verified    Time: 0915      Date of procedure: 11/6/2019    Procedure performed by:  Linsey Ferrari MD    Provider assisted by: Lavelle Lopez LPN    Patient assisted by: self    How tolerated by patient: tolerated the procedure well with no complications    Post Procedural Pain Scale: 0 - No Hurt    Comments: none                  Electronically signed by: Linsey Ferrari MD

## 2019-11-18 ENCOUNTER — TELEPHONE (OUTPATIENT)
Dept: FAMILY MEDICINE CLINIC | Age: 69
End: 2019-11-18

## 2019-11-18 NOTE — TELEPHONE ENCOUNTER
Patient called stating Dr. Rasheeda Bronson is unable to get him in anytime soon. They are not booking into next year yet. He is requesting to be referred to another facility.

## 2019-11-20 NOTE — TELEPHONE ENCOUNTER
See GI referral for updated details. [facility] Confirmation received.

## 2019-12-11 ENCOUNTER — OFFICE VISIT (OUTPATIENT)
Dept: FAMILY MEDICINE CLINIC | Age: 69
End: 2019-12-11

## 2019-12-11 VITALS
OXYGEN SATURATION: 97 % | RESPIRATION RATE: 16 BRPM | SYSTOLIC BLOOD PRESSURE: 136 MMHG | DIASTOLIC BLOOD PRESSURE: 66 MMHG | HEART RATE: 70 BPM | WEIGHT: 204 LBS | HEIGHT: 72 IN | BODY MASS INDEX: 27.63 KG/M2 | TEMPERATURE: 97.5 F

## 2019-12-11 DIAGNOSIS — M25.561 CHRONIC PAIN OF RIGHT KNEE: ICD-10-CM

## 2019-12-11 DIAGNOSIS — I10 ESSENTIAL HYPERTENSION: ICD-10-CM

## 2019-12-11 DIAGNOSIS — G89.29 CHRONIC PAIN OF RIGHT KNEE: ICD-10-CM

## 2019-12-11 DIAGNOSIS — M54.2 CERVICALGIA: Primary | ICD-10-CM

## 2019-12-11 RX ORDER — GABAPENTIN 400 MG/1
400 CAPSULE ORAL 3 TIMES DAILY
COMMUNITY
End: 2020-03-11 | Stop reason: ALTCHOICE

## 2019-12-11 NOTE — PROGRESS NOTES
Chief Complaint   Patient presents with    Hypertension     1. Have you been to the ER, urgent care clinic since your last visit? Hospitalized since your last visit? No    2. Have you seen or consulted any other health care providers outside of the [address] [place] [address] since your last visit? Include any pap smears or colon screening. No     HPI  Maris Dumont comes in for f/u care. Neck pain: Patient has neck pain that comes on and off. Worse with moving the neck to the right and to the left. Denies numbness or tingling upper extremities. Suspect cervicalgia. Will do cervical spine x-ray. He can take Tylenol arthritis for the pain. Discussed supportive care measures. HTN: Patient has hypertension. He is on lisinopril. Blood pressure is stable. He denies headache, changes in vision or focal weakness. Continue current treatment plan. Knee pain: Patient has chronic knee pain. Has been seen by the orthopedist and did have intra-articular injection given. This is stable. He occasionally has to take Aleve or Tylenol for pain. Continue current treatment plan. Past Medical History  Past Medical History:   Diagnosis Date    Aortic valve insufficiency     Arthritis     Hypertension     Other ill-defined conditions(368.70)     murmer       Surgical History  Past Surgical History:   Procedure Laterality Date    HX APPENDECTOMY  1961    HX OTHER SURGICAL Left 1990's    Exc. mass foot     HX OTHER SURGICAL Right 1980    release Dequervain's contracture    HX OTHER SURGICAL Left 2014    Removal large nevi behind ear        Medications  Current Outpatient Medications   Medication Sig Dispense Refill    gabapentin (NEURONTIN) 400 mg capsule Take 400 mg by mouth three (3) times daily.       finasteride (PROSCAR) 5 mg tablet TAKE 1 TABLET BY MOUTH  DAILY 90 Tab 3    amLODIPine (NORVASC) 10 mg tablet TAKE 1 TABLET BY MOUTH  DAILY 90 Tab 1    atorvastatin (LIPITOR) 40 mg tablet TAKE 1 TABLET BY MOUTH  DAILY 90 Tab 1    benazepril (LOTENSIN) 40 mg tablet TAKE 1 TABLET BY MOUTH  DAILY 90 Tab 1    travoprost (TRAVATAN Z) 0.004 % ophthalmic solution Administer 1 Drop to both eyes nightly.  acetaminophen (TYLENOL EXTRA STRENGTH) 500 mg tablet Take 1,000 mg by mouth every six (6) hours as needed for Pain.  pregabalin (LYRICA) 100 mg capsule Take 1 Cap by mouth two (2) times a day.  Max Daily Amount: 200 mg. 60 Cap 2       Allergies  No Known Allergies    Family History  Family History   Problem Relation Age of Onset    No Known Problems Mother     No Known Problems Father        Social History  Social History     Socioeconomic History    Marital status:      Spouse name: Not on file    Number of children: Not on file    Years of education: Not on file    Highest education level: Not on file   Occupational History    Not on file   Social Needs    Financial resource strain: Not on file    Food insecurity:     Worry: Not on file     Inability: Not on file    Transportation needs:     Medical: Not on file     Non-medical: Not on file   Tobacco Use    Smoking status: Former Smoker     Last attempt to quit: 1980     Years since quittin.9    Smokeless tobacco: Never Used   Substance and Sexual Activity    Alcohol use: No    Drug use: No     Comment: H/O in the past Marijuana    Sexual activity: Not on file   Lifestyle    Physical activity:     Days per week: Not on file     Minutes per session: Not on file    Stress: Not on file   Relationships    Social connections:     Talks on phone: Not on file     Gets together: Not on file     Attends Confucianism service: Not on file     Active member of club or organization: Not on file     Attends meetings of clubs or organizations: Not on file     Relationship status: Not on file    Intimate partner violence:     Fear of current or ex partner: Not on file     Emotionally abused: Not on file     Physically abused: Not on file Forced sexual activity: Not on file   Other Topics Concern    Not on file   Social History Narrative    Not on file       Review of Systems  Review of Systems - Review of all systems is negative except as noted above in the HPI. Vital Signs  Visit Vitals  /66 (BP 1 Location: Left arm, BP Patient Position: Sitting)   Pulse 70   Temp 97.5 °F (36.4 °C) (Oral)   Resp 16   Ht 6' (1.829 m)   Wt 204 lb (92.5 kg)   SpO2 97%   BMI 27.67 kg/m²         Physical Exam  Physical Examination: General appearance - alert, well appearing, and in no distress and oriented to person, place, and time  Mental status - alert, oriented to person, place, and time, affect appropriate to mood  Mouth - mucous membranes moist, pharynx normal without lesions  Neck -paracervical muscle tightness with discomfort midline cervical spine to palpation  Lymphatics - no palpable lymphadenopathy  Chest - clear to auscultation, no wheezes, rales or rhonchi, symmetric air entry  Heart - normal rate and regular rhythm, S1 and S2 normal  Neurological - motor and sensory grossly normal bilaterally  Extremities - intact peripheral pulses    Results  Results for orders placed or performed in visit on 10/21/19   AMB POC URINALYSIS DIP STICK AUTO W/O MICRO   Result Value Ref Range    Color (UA POC) Yellow     Clarity (UA POC) Clear     Glucose (UA POC) Negative Negative    Bilirubin (UA POC) Negative Negative    Ketones (UA POC) Negative Negative    Specific gravity (UA POC) 1.010 1.001 - 1.035    Blood (UA POC) Negative Negative    pH (UA POC) 6.0 4.6 - 8.0    Protein (UA POC) Negative Negative    Urobilinogen (UA POC) 0.2 mg/dL 0.2 - 1    Nitrites (UA POC) Negative Negative    Leukocyte esterase (UA POC) Negative Negative       ASSESSMENT and PLAN    ICD-10-CM ICD-9-CM    1. Cervicalgia M54.2 723.1 XR SPINE CERV 4 OR 5 V   2. Essential hypertension I10 401.9    3.  Chronic pain of right knee M25.561 719.46     G89.29 338.29      reviewed diet, exercise and weight control  reviewed medications and side effects in detail  radiology results and schedule of future radiology studies reviewed with patient      I have discussed the diagnosis with the patient and the intended plan of care as seen in the above orders. The patient has received an after-visit summary and questions were answered concerning future plans. I have discussed medication, side effects, and warnings with the patient in detail. The patient verbalized understanding and is in agreement with the plan of care. The patient will contact the office with any additional concerns. Deangelo Cole MD    PLEASE NOTE:   This document has been produced using voice recognition software.  Unrecognized errors in transcription may be present

## 2019-12-16 DIAGNOSIS — M54.2 CHRONIC NECK PAIN: Primary | ICD-10-CM

## 2019-12-16 DIAGNOSIS — G89.29 CHRONIC NECK PAIN: Primary | ICD-10-CM

## 2020-02-10 DIAGNOSIS — I10 ESSENTIAL HYPERTENSION: ICD-10-CM

## 2020-02-10 DIAGNOSIS — E78.5 DYSLIPIDEMIA: ICD-10-CM

## 2020-02-10 RX ORDER — BENAZEPRIL HYDROCHLORIDE 40 MG/1
TABLET ORAL
Qty: 90 TAB | Refills: 1 | Status: SHIPPED | OUTPATIENT
Start: 2020-02-10 | End: 2020-07-14

## 2020-02-10 RX ORDER — AMLODIPINE BESYLATE 10 MG/1
TABLET ORAL
Qty: 90 TAB | Refills: 1 | Status: SHIPPED | OUTPATIENT
Start: 2020-02-10 | End: 2020-07-14

## 2020-02-10 RX ORDER — ATORVASTATIN CALCIUM 40 MG/1
TABLET, FILM COATED ORAL
Qty: 90 TAB | Refills: 1 | Status: SHIPPED | OUTPATIENT
Start: 2020-02-10 | End: 2020-07-14

## 2020-03-11 ENCOUNTER — HOSPITAL ENCOUNTER (OUTPATIENT)
Dept: LAB | Age: 70
Discharge: HOME | End: 2020-03-11
Payer: MEDICARE

## 2020-03-11 ENCOUNTER — OFFICE VISIT (OUTPATIENT)
Dept: FAMILY MEDICINE CLINIC | Age: 70
End: 2020-03-11

## 2020-03-11 VITALS
DIASTOLIC BLOOD PRESSURE: 68 MMHG | RESPIRATION RATE: 16 BRPM | BODY MASS INDEX: 26.95 KG/M2 | HEART RATE: 68 BPM | SYSTOLIC BLOOD PRESSURE: 145 MMHG | OXYGEN SATURATION: 99 % | TEMPERATURE: 98.2 F | WEIGHT: 199 LBS | HEIGHT: 72 IN

## 2020-03-11 DIAGNOSIS — I10 ESSENTIAL HYPERTENSION: ICD-10-CM

## 2020-03-11 DIAGNOSIS — E07.9 THYROID DISORDER: ICD-10-CM

## 2020-03-11 DIAGNOSIS — R61 NIGHT SWEATS: ICD-10-CM

## 2020-03-11 DIAGNOSIS — M25.551 PAIN OF RIGHT HIP JOINT: Primary | ICD-10-CM

## 2020-03-11 DIAGNOSIS — G62.9 NEUROPATHY: ICD-10-CM

## 2020-03-11 DIAGNOSIS — R39.9 LOWER URINARY TRACT SYMPTOMS (LUTS): ICD-10-CM

## 2020-03-11 DIAGNOSIS — Z01.89 ENCOUNTER FOR LABORATORY EXAMINATION: ICD-10-CM

## 2020-03-11 LAB
ALBUMIN SERPL-MCNC: 4.4 G/DL (ref 3.4–5)
ALBUMIN/GLOB SERPL: 1.2 {RATIO} (ref 0.8–1.7)
ALP SERPL-CCNC: 76 U/L (ref 45–117)
ALT SERPL-CCNC: 25 U/L (ref 16–61)
ANION GAP SERPL CALC-SCNC: 5 MMOL/L (ref 3–18)
AST SERPL-CCNC: 26 U/L (ref 10–38)
BASOPHILS # BLD: 0.1 K/UL (ref 0–0.1)
BASOPHILS NFR BLD: 1 % (ref 0–2)
BILIRUB SERPL-MCNC: 0.6 MG/DL (ref 0.2–1)
BILIRUB UR QL STRIP: NEGATIVE
BUN SERPL-MCNC: 17 MG/DL (ref 7–18)
BUN/CREAT SERPL: 17 (ref 12–20)
CALCIUM SERPL-MCNC: 9.6 MG/DL (ref 8.5–10.1)
CHLORIDE SERPL-SCNC: 107 MMOL/L (ref 100–111)
CO2 SERPL-SCNC: 29 MMOL/L (ref 21–32)
CREAT SERPL-MCNC: 1.02 MG/DL (ref 0.6–1.3)
DIFFERENTIAL METHOD BLD: ABNORMAL
EOSINOPHIL # BLD: 0.1 K/UL (ref 0–0.4)
EOSINOPHIL NFR BLD: 1 % (ref 0–5)
ERYTHROCYTE [DISTWIDTH] IN BLOOD BY AUTOMATED COUNT: 11.9 % (ref 11.6–14.5)
GLOBULIN SER CALC-MCNC: 3.7 G/DL (ref 2–4)
GLUCOSE SERPL-MCNC: 98 MG/DL (ref 74–99)
GLUCOSE UR-MCNC: NEGATIVE MG/DL
HCT VFR BLD AUTO: 48.6 % (ref 36–48)
HGB BLD-MCNC: 15.6 G/DL (ref 13–16)
KETONES P FAST UR STRIP-MCNC: NEGATIVE MG/DL
LYMPHOCYTES # BLD: 1.6 K/UL (ref 0.9–3.6)
LYMPHOCYTES NFR BLD: 34 % (ref 21–52)
MCH RBC QN AUTO: 27.5 PG (ref 24–34)
MCHC RBC AUTO-ENTMCNC: 32.1 G/DL (ref 31–37)
MCV RBC AUTO: 85.6 FL (ref 74–97)
MONOCYTES # BLD: 0.5 K/UL (ref 0.05–1.2)
MONOCYTES NFR BLD: 10 % (ref 3–10)
NEUTS SEG # BLD: 2.6 K/UL (ref 1.8–8)
NEUTS SEG NFR BLD: 54 % (ref 40–73)
PH UR STRIP: 5.5 [PH] (ref 4.6–8)
PLATELET # BLD AUTO: 259 K/UL (ref 135–420)
PMV BLD AUTO: 11 FL (ref 9.2–11.8)
POTASSIUM SERPL-SCNC: 4.5 MMOL/L (ref 3.5–5.5)
PROT SERPL-MCNC: 8.1 G/DL (ref 6.4–8.2)
PROT UR QL STRIP: NEGATIVE
RBC # BLD AUTO: 5.68 M/UL (ref 4.7–5.5)
SODIUM SERPL-SCNC: 141 MMOL/L (ref 136–145)
SP GR UR STRIP: 1.02 (ref 1–1.03)
TSH SERPL DL<=0.05 MIU/L-ACNC: 0.24 UIU/ML (ref 0.36–3.74)
UA UROBILINOGEN AMB POC: NORMAL (ref 0.2–1)
URINALYSIS CLARITY POC: CLEAR
URINALYSIS COLOR POC: YELLOW
URINE BLOOD POC: NORMAL
URINE LEUKOCYTES POC: NEGATIVE
URINE NITRITES POC: NEGATIVE
WBC # BLD AUTO: 4.7 K/UL (ref 4.6–13.2)

## 2020-03-11 PROCEDURE — 80053 COMPREHEN METABOLIC PANEL: CPT

## 2020-03-11 PROCEDURE — 85025 COMPLETE CBC W/AUTO DIFF WBC: CPT

## 2020-03-11 PROCEDURE — 84443 ASSAY THYROID STIM HORMONE: CPT

## 2020-03-11 RX ORDER — HYDROCHLOROTHIAZIDE 12.5 MG/1
12.5 TABLET ORAL DAILY
Qty: 90 TAB | Refills: 1 | Status: SHIPPED | OUTPATIENT
Start: 2020-03-11 | End: 2020-07-14

## 2020-03-11 RX ORDER — PREGABALIN 100 MG/1
100 CAPSULE ORAL 2 TIMES DAILY
Qty: 180 CAP | Refills: 2 | Status: SHIPPED | OUTPATIENT
Start: 2020-03-11 | End: 2020-05-11 | Stop reason: DRUGHIGH

## 2020-03-11 NOTE — PROGRESS NOTES
Chief Complaint   Patient presents with    Hypertension    Hip Pain     night sweats and urinary frequency concern      1. Have you been to the ER, urgent care clinic since your last visit? Hospitalized since your last visit? No    2. Have you seen or consulted any other health care providers outside of the [address] [place] [address] since your last visit? Include any pap smears or colon screening. No     [facility]  Thang Cuellar comes in for f/u care. Hip pain: patient has right hip pain, ongoing for days. Triggered by activity and movement. Denies trauma. Taken gabapentin and tylenol. I will order xray hip. I did look at the x-ray of the hip. This is stable. Will await radiologist report. This is likely due to myofascial pain. Patient can take Tylenol arthritis for the pain. If persistent will refer to the orthopedist.  Neuropathy: patient has neuropathy, he is on gabapentin, would like to try lyrica. I will give prescription for this. He does get numbness and tingling of the upper and lower extremities. HTN: patient blood pressure is elevated today. He denies headache, changes in vision or focal weakness. Discussed medication adjustment. I will add HCTZ 12.5 mg daily. Recheck blood pressure at next visit. Patient has night sweats ongoing for weeks. He has also had cold and congestion. Seen in UC and given azithromycin with some relief. He is taking OTC medication for URI. I will check labs. Urology: he has urinary incontinence and urgency. Noted to have BPH with LUTS. He has been seen by the urologist in the past. Would like referral to a different urologist. He has been on finasteride without much improvement. No dysuria or hematuria. Referral will be placed.       Past Medical History  Past Medical History:   Diagnosis Date    Aortic valve insufficiency     Arthritis     Hypertension     Other ill-defined conditions(874.17)     murmer       Surgical History  Past Surgical History: Procedure Laterality Date    HX APPENDECTOMY      HX OTHER SURGICAL Left     Exc. mass foot     HX OTHER SURGICAL Right     release Dequervain's contracture    HX OTHER SURGICAL Left     Removal large nevi behind ear        Medications  Current Outpatient Medications   Medication Sig Dispense Refill    atorvastatin (LIPITOR) 40 mg tablet TAKE 1 TABLET BY MOUTH  DAILY 90 Tab 1    benazepriL (LOTENSIN) 40 mg tablet TAKE 1 TABLET BY MOUTH  DAILY 90 Tab 1    amLODIPine (NORVASC) 10 mg tablet TAKE 1 TABLET BY MOUTH  DAILY 90 Tab 1    gabapentin (NEURONTIN) 400 mg capsule Take 400 mg by mouth three (3) times daily.  finasteride (PROSCAR) 5 mg tablet TAKE 1 TABLET BY MOUTH  DAILY 90 Tab 3    travoprost (TRAVATAN Z) 0.004 % ophthalmic solution Administer 1 Drop to both eyes nightly.  acetaminophen (TYLENOL EXTRA STRENGTH) 500 mg tablet Take 1,000 mg by mouth every six (6) hours as needed for Pain.  pregabalin (LYRICA) 100 mg capsule Take 1 Cap by mouth two (2) times a day.  Max Daily Amount: 200 mg. 60 Cap 2       Allergies  No Known Allergies    Family History  Family History   Problem Relation Age of Onset    No Known Problems Mother     No Known Problems Father        Social History  Social History     Socioeconomic History    Marital status:      Spouse name: Not on file    Number of children: Not on file    Years of education: Not on file    Highest education level: Not on file   Occupational History    Not on file   Social Needs    Financial resource strain: Not on file    Food insecurity     Worry: Not on file     Inability: Not on file    Transportation needs     Medical: Not on file     Non-medical: Not on file   Tobacco Use    Smoking status: Former Smoker     Last attempt to quit: 1980     Years since quittin.2    Smokeless tobacco: Never Used   Substance and Sexual Activity    Alcohol use: No    Drug use: No     Comment: H/O in the past Marijuana    Sexual activity: Not on file   Lifestyle    Physical activity     Days per week: Not on file     Minutes per session: Not on file    Stress: Not on file   Relationships    Social connections     Talks on phone: Not on file     Gets together: Not on file     Attends [employer] service: Not on file     Active member of club or organization: Not on file     Attends meetings of clubs or organizations: Not on file     Relationship status: Not on file    Intimate partner violence     Fear of current or ex partner: Not on file     Emotionally abused: Not on file     Physically abused: Not on file     Forced sexual activity: Not on file   Other Topics Concern    Not on file   Social History Narrative    Not on file       Review of Systems  Review of Systems - Review of all systems is negative except as noted above in the HPI.     Vital Signs  Visit Vitals  /68 (BP 1 Location: Left arm, BP Patient Position: Sitting)   Pulse 68   Temp 98.2 °F (36.8 °C) (Oral)   Resp 16   Ht 6' (1.829 m)   Wt 199 lb (90.3 kg)   SpO2 99%   BMI 26.99 kg/m²         Physical Exam  Physical Examination: General appearance - alert, well appearing, and in no distress, oriented to person, place, and time, overweight and acyanotic, in no respiratory distress  Mental status - alert, oriented to person, place, and time, affect appropriate to mood  Nose - mucosal congestion and clear rhinorrhea  Mouth - mucous membranes moist, pharynx normal without lesions  Neck - supple, no significant adenopathy  Lymphatics - no palpable lymphadenopathy  Chest - clear to auscultation, no wheezes, rales or rhonchi, symmetric air entry  Heart - normal rate and regular rhythm, S1 and S2 normal  Abdomen - no rebound tenderness noted  Back exam - limited range of motion  Neurological - neck supple without rigidity  Musculoskeletal - osteoarthritic changes noted in both hands, right hip with discomfort to internal and external rotation  Extremities - intact peripheral pulses    Results  Results for orders placed or performed in visit on 10/21/19   AMB POC URINALYSIS DIP STICK AUTO W/O MICRO   Result Value Ref Range    Color (UA POC) Yellow     Clarity (UA POC) Clear     Glucose (UA POC) Negative Negative    Bilirubin (UA POC) Negative Negative    Ketones (UA POC) Negative Negative    Specific gravity (UA POC) 1.010 1.001 - 1.035    Blood (UA POC) Negative Negative    pH (UA POC) 6.0 4.6 - 8.0    Protein (UA POC) Negative Negative    Urobilinogen (UA POC) 0.2 mg/dL 0.2 - 1    Nitrites (UA POC) Negative Negative    Leukocyte esterase (UA POC) Negative Negative       ASSESSMENT and PLAN    ICD-10-CM ICD-9-CM    1. Pain of right hip joint M25.551 719.45 XR HIP RT W OR WO PELV 2-3 VWS   2. Neuropathy G62.9 355.9 pregabalin (LYRICA) 100 mg capsule      COLLECTION VENOUS BLOOD,VENIPUNCTURE   3. Lower urinary tract symptoms (LUTS) R39.9 788.99 REFERRAL TO UROLOGY      AMB POC URINALYSIS DIP STICK AUTO W/O MICRO   4. Night sweats R61 780.8 TSH 3RD GENERATION   5. Essential hypertension F38 242.9 METABOLIC PANEL, COMPREHENSIVE      CBC WITH AUTOMATED DIFF      hydroCHLOROthiazide (HYDRODIURIL) 12.5 mg tablet      COLLECTION VENOUS BLOOD,VENIPUNCTURE   6. Thyroid disorder E07.9 246.9 TSH 3RD GENERATION      COLLECTION VENOUS BLOOD,VENIPUNCTURE   7. Encounter for laboratory examination Z01.89 V72.60 COLLECTION VENOUS BLOOD,VENIPUNCTURE     lab results and schedule of future lab studies reviewed with patient  reviewed diet, exercise and weight control  cardiovascular risk and specific lipid/LDL goals reviewed  reviewed medications and side effects in detail  radiology results and schedule of future radiology studies reviewed with patient      I have discussed the diagnosis with the patient and the intended plan of care as seen in the above orders. The patient has received an after-visit summary and questions were answered concerning future plans.  I have discussed medication, side effects, and warnings with the patient in detail. The patient verbalized understanding and is in agreement with the plan of care. The patient will contact the office with any additional concerns. Conception MD Laurie    PLEASE NOTE:   This document has been produced using voice recognition software.  Unrecognized errors in transcription may be present

## 2020-03-11 NOTE — PROGRESS NOTES
Patient presents for lab draw ordered by Dr Shaheen Jorge  Ordering Department/Practice:  [facility]  Date Ordered:  3-     The following labs were drawn and sent to [facility]     CBC, CMP and TSH, 3rd Generation    The following tubes were sent:    Gold  ( 1) and Lavender  ( 1)    Draw site:  RAC  Pain Level:0  Needle Gauge23  Aseptic technique used  Blood thinners:no  Band-Aid applied  Draw fee added   Procedure tolerated well, patient voiced no complaints.

## 2020-03-16 NOTE — PROGRESS NOTES
Please let patient know his TSH level is slightly low at 0.24. This indicates he does have increased thyroid hormone. I would recheck this in about 6 weeks. Rest of his labs are reassuring.   Conception MD Laurie

## 2020-03-26 ENCOUNTER — TELEPHONE (OUTPATIENT)
Dept: CARDIOLOGY CLINIC | Age: 70
End: 2020-03-26

## 2020-03-26 NOTE — TELEPHONE ENCOUNTER
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 Patient needs to know if he should take medical leave of absences.  Please advise

## 2020-03-26 NOTE — TELEPHONE ENCOUNTER
Due to age and medical conditions, he is at high risk for luigi Covid. Recommend limiting contact as much as possible.

## 2020-03-26 NOTE — TELEPHONE ENCOUNTER
Spoke with patient per Dr. Kebede Sera to age and medical conditions, he is at high risk for luigi Covid. Recommend limiting contact as much as possible. He voices understanding and acceptance of this advice and will call back if any further questions or concerns.

## 2020-03-30 NOTE — PROGRESS NOTES
Spoke with patient regarding lab results. Patient verbalized understanding at this time.  No concerns noted

## 2020-05-11 ENCOUNTER — TELEPHONE (OUTPATIENT)
Dept: FAMILY MEDICINE CLINIC | Age: 70
End: 2020-05-11

## 2020-05-11 DIAGNOSIS — G62.9 NEUROPATHY: ICD-10-CM

## 2020-05-11 DIAGNOSIS — G62.9 NEUROPATHY: Primary | ICD-10-CM

## 2020-05-11 RX ORDER — PREGABALIN 150 MG/1
150 CAPSULE ORAL 2 TIMES DAILY
Qty: 60 CAP | Refills: 1 | Status: SHIPPED | OUTPATIENT
Start: 2020-05-11 | End: 2020-05-11 | Stop reason: SDUPTHER

## 2020-05-11 RX ORDER — PREGABALIN 150 MG/1
150 CAPSULE ORAL 2 TIMES DAILY
Qty: 180 CAP | Refills: 0 | Status: SHIPPED | OUTPATIENT
Start: 2020-05-11 | End: 2020-09-10

## 2020-05-11 NOTE — TELEPHONE ENCOUNTER
Spoke to patient at this time and patient states he was change from Gabapentin to Lyrica and he states he has not have any improvement with his hip and back. Patient is asking if medication can be changed to something stronger or increase the dosage at this time. Please advise.

## 2020-05-11 NOTE — TELEPHONE ENCOUNTER
Patient states he want medication sent to mail order. Please send to mail order. Already called [facility] to cancel

## 2020-06-10 ENCOUNTER — OFFICE VISIT (OUTPATIENT)
Dept: ORTHOPEDIC SURGERY | Age: 70
End: 2020-06-10

## 2020-06-10 VITALS
RESPIRATION RATE: 16 BRPM | OXYGEN SATURATION: 97 % | BODY MASS INDEX: 27.94 KG/M2 | HEIGHT: 73 IN | DIASTOLIC BLOOD PRESSURE: 75 MMHG | WEIGHT: 210.8 LBS | HEART RATE: 77 BPM | SYSTOLIC BLOOD PRESSURE: 138 MMHG | TEMPERATURE: 97 F

## 2020-06-10 DIAGNOSIS — M17.11 LOCALIZED OSTEOARTHRITIS OF RIGHT KNEE: Primary | ICD-10-CM

## 2020-06-10 RX ORDER — TRIAMCINOLONE ACETONIDE 40 MG/ML
40 INJECTION, SUSPENSION INTRA-ARTICULAR; INTRAMUSCULAR ONCE
Qty: 1 VIAL | Refills: 0
Start: 2020-06-10 | End: 2020-06-10

## 2020-06-10 NOTE — PROGRESS NOTES
Patient: Rosy Arce                MRN: [id number]       SSN: [id number]  YOB: 1950        AGE: 69 y.o. SEX: male  Body mass index is 27.81 kg/m². PCP: Mook Martinez MD  06/10/20    Chief Complaint: Right knee pain    HPI: Rosy Arce is a 69 y.o. male patient who returns to the office today for his right knee. He continues to complain of some right knee pain that he says is 6-9 out of 10 on the pain scale. The pain is worse to standing and walking. Past Medical History:   Diagnosis Date    Aortic valve insufficiency     Arthritis     Hypertension     Other ill-defined conditions(709.62)     murmer       Family History   Problem Relation Age of Onset    No Known Problems Mother     No Known Problems Father        Current Outpatient Medications   Medication Sig Dispense Refill    triamcinolone acetonide (KENALOG-40) 40 mg/mL injection 1 mL by Intra artICUlar route once for 1 dose. 1 Vial 0    pregabalin (LYRICA) 150 mg capsule Take 1 Cap by mouth two (2) times a day. Max Daily Amount: 300 mg. 180 Cap 0    avanafiL (STENDRA) 200 mg tab tablet Take 1 Tab by mouth as needed for Other. 10 Tab 3    alfuzosin SR (UROXATRAL) 10 mg SR tablet Take 1 Tab by mouth daily (after dinner). 90 Tab 3    hydroCHLOROthiazide (HYDRODIURIL) 12.5 mg tablet Take 1 Tab by mouth daily. 90 Tab 1    atorvastatin (LIPITOR) 40 mg tablet TAKE 1 TABLET BY MOUTH  DAILY 90 Tab 1    benazepriL (LOTENSIN) 40 mg tablet TAKE 1 TABLET BY MOUTH  DAILY 90 Tab 1    amLODIPine (NORVASC) 10 mg tablet TAKE 1 TABLET BY MOUTH  DAILY 90 Tab 1    finasteride (PROSCAR) 5 mg tablet TAKE 1 TABLET BY MOUTH  DAILY 90 Tab 3    travoprost (TRAVATAN Z) 0.004 % ophthalmic solution Administer 1 Drop to both eyes nightly.  acetaminophen (TYLENOL EXTRA STRENGTH) 500 mg tablet Take 1,000 mg by mouth every six (6) hours as needed for Pain.          No Known Allergies    Past Surgical History:   Procedure Laterality Date    HX APPENDECTOMY      HX OTHER SURGICAL Left     Exc. mass foot     HX OTHER SURGICAL Right     release Dequervain's contracture    HX OTHER SURGICAL Left     Removal large nevi behind ear       Social History     Socioeconomic History    Marital status:      Spouse name: Not on file    Number of children: Not on file    Years of education: Not on file    Highest education level: Not on file   Occupational History    Not on file   Social Needs    Financial resource strain: Not on file    Food insecurity     Worry: Not on file     Inability: Not on file    Transportation needs     Medical: Not on file     Non-medical: Not on file   Tobacco Use    Smoking status: Former Smoker     Last attempt to quit: 1980     Years since quittin.4    Smokeless tobacco: Never Used   Substance and Sexual Activity    Alcohol use: No    Drug use: No     Comment: H/O in the past Marijuana    Sexual activity: Not on file   Lifestyle    Physical activity     Days per week: Not on file     Minutes per session: Not on file    Stress: Not on file   Relationships    Social connections     Talks on phone: Not on file     Gets together: Not on file     Attends [employer] service: Not on file     Active member of club or organization: Not on file     Attends meetings of clubs or organizations: Not on file     Relationship status: Not on file    Intimate partner violence     Fear of current or ex partner: Not on file     Emotionally abused: Not on file     Physically abused: Not on file     Forced sexual activity: Not on file   Other Topics Concern    Not on file   Social History Narrative    Not on file       REVIEW OF SYSTEMS:      No changes from previous review of systems unless noted.     PHYSICAL EXAMINATION:  Visit Vitals  /75 (BP 1 Location: Left arm, BP Patient Position: Sitting)   Pulse 77   Temp 97 °F (36.1 °C) (Oral)   Resp 16   Ht 6' 1\" (1.854 m)   Wt 210 lb 12.8 oz (95.6 kg)   SpO2 97%   BMI 27.81 kg/m²     Body mass index is 27.81 kg/m². GENERAL: Alert and oriented x3, in no acute distress. HEENT: Normocephalic, atraumatic. RESP: Non labored breathing. SKIN: No rashes or lesions noted. Knee Examination      R   L  Effusion   +   -  Warmth   -   -  Erythema   -   -  ROM   Extension  Full   Full   Flexion   Full   Full  Tenderness   Medial Joint  +   -   Lateral Joint  -   -   Posterior knee  -   -  Strength   Quad   5   5   Hamstring  5   5  Crepitus   Tibiofemoral   +   -   Patellofemoral  -   -  Instability   Anterior  -   -   Posterior  -   -   Patellofemoral  -   -  Lachman's   -   -  Anterior Drawer  -   -  Posterior Drawer  -   -  Daisy's   Pain   -   -   Locking  -   -  Calf TTP   -   -  Straight Leg Raise  -   -      IMAGING:  No new imaging today    ASSESSMENT & PLAN  Diagnosis: Right knee osteoarthritis    Earlene Rawls continues to have symptomatic right knee osteoarthritis. This is responded well to cortisone shot in the past.  We discussed multiple treatment options including surgery but at this point he is responding well to conservative treatment and would like to continue that. Therefore the right knee was injected without any complications. He will follow-up as needed.     [facility]  OFFICE PROCEDURE PROGRESS NOTE        Chart reviewed for the following:   IMagdalena MD, have reviewed the History, Physical and updated the Allergic reactions for [address] performed immediately prior to start of procedure:   Edgar Sanches MD, have performed the following reviews on Dala Hoots prior to the start of the procedure:            * Patient was identified by name and date of birth   * Agreement on procedure being performed was verified  * Risks and Benefits explained to the patient  * Procedure site verified and marked as necessary  * Patient was positioned for comfort  * Consent was signed and verified    Time: 11:34 AM    Location: Right knee    Kenalog 40mg & 3cc Lidocaine    Date of procedure: 6/10/2020    Procedure performed by:  Yasmeen Medina MD    Provider assisted by: Thee Sood LPN    Patient assisted by: self    How tolerated by patient: tolerated the procedure well with no complications    Post Procedural Pain Scale: 0 - No Hurt    Comments: none                Electronically signed by: Yasmeen Medina MD

## 2020-06-11 ENCOUNTER — VIRTUAL VISIT (OUTPATIENT)
Dept: FAMILY MEDICINE CLINIC | Age: 70
End: 2020-06-11

## 2020-06-11 DIAGNOSIS — G89.29 CHRONIC PAIN OF RIGHT KNEE: ICD-10-CM

## 2020-06-11 DIAGNOSIS — G62.9 NEUROPATHY: ICD-10-CM

## 2020-06-11 DIAGNOSIS — N48.6 PEYRONIE'S SYNDROME: ICD-10-CM

## 2020-06-11 DIAGNOSIS — M25.561 CHRONIC PAIN OF RIGHT KNEE: ICD-10-CM

## 2020-06-11 DIAGNOSIS — I10 ESSENTIAL HYPERTENSION: Primary | ICD-10-CM

## 2020-06-11 DIAGNOSIS — R39.9 LOWER URINARY TRACT SYMPTOMS (LUTS): ICD-10-CM

## 2020-06-11 DIAGNOSIS — E78.5 DYSLIPIDEMIA: ICD-10-CM

## 2020-06-11 NOTE — PROGRESS NOTES
Lyssa Hernandes is a 69 y.o. male evaluated via audio only technology on 6/11/2020. Consent: He and/or his health care decision maker is aware that he may receive a bill for this audio only encounter, depending on his insurance coverage, and has provided verbal consent to proceed: Yes    I communicated with the patient and/or health care decision maker about the nature and details of the following:  Assessment & Plan:       ICD-10-CM ICD-9-CM    1. Essential hypertension I10 401.9    2. Lower urinary tract symptoms (LUTS) R39.9 788.99    3. Neuropathy G62.9 355.9    4. Dyslipidemia E78.5 272.4    5. Chronic pain of right knee M25.561 719.46     G89.29 338.29    6. Peyronie's syndrome N48.6 607.85          12  Subjective:   Lyssa Hernandes is a 69 y.o. male who was seen for Hypertension and Knee Pain  Patient has hypertension. He is on HCTZ and amlodipine and Lotensin. Blood pressure has been stable. He denies headache, changes in vision or focal weakness. We will continue with the current treatment plan. He has right knee pain that has been ongoing for weeks. Did have swelling of the right knee. Seen by the orthopedist yesterday and had intra-articular cortisone injection. Feels slightly improved today. In the past he has been using Tylenol arthritis for the pain but this caused him to have constipation. Patient has a history of BPH and LUTS. He has been followed up by urologist.  He has Peyronie's syndrome. He is on medication. Takes alfuzosin finasteride, and Stendra. He will continue with management as per the urologist.  Patient has dyslipidemia and takes Lipitor. We will recheck his lipid panel at next visit. He does have neuropathy and chronic pain. He is on Lyrica. Currently taking 150 mg twice a day. This has done better in controlling his symptoms. We will continue with the current treatment plan. Prior to Admission medications    Medication Sig Start Date End Date Taking? Authorizing Provider   triamcinolone acetonide (KENALOG-40) 40 mg/mL injection 1 mL by Intra artICUlar route once for 1 dose. 6/10/20 6/10/20  Connie Rosas MD   pregabalin (LYRICA) 150 mg capsule Take 1 Cap by mouth two (2) times a day. Max Daily Amount: 300 mg. 5/11/20   Alma Cramer MD   avanafiL (STENDRA) 200 mg tab tablet Take 1 Tab by mouth as needed for Other. 4/7/20   Meredith Espinosa MD   alfuzosin SR (UROXATRAL) 10 mg SR tablet Take 1 Tab by mouth daily (after dinner). 4/7/20   Meredith Espinosa MD   hydroCHLOROthiazide (HYDRODIURIL) 12.5 mg tablet Take 1 Tab by mouth daily. 3/11/20   Alma Cramer MD   atorvastatin (LIPITOR) 40 mg tablet TAKE 1 TABLET BY MOUTH  DAILY 2/10/20   Alma Cramer MD   benazepriL (LOTENSIN) 40 mg tablet TAKE 1 TABLET BY MOUTH  DAILY 2/10/20   Alma Cramer MD   amLODIPine (NORVASC) 10 mg tablet TAKE 1 TABLET BY MOUTH  DAILY 2/10/20   Alma Cramer MD   finasteride (PROSCAR) 5 mg tablet TAKE 1 TABLET BY MOUTH  DAILY 10/1/19   Zach Earl MD   travoprost (TRAVATAN Z) 0.004 % ophthalmic solution Administer 1 Drop to both eyes nightly. Provider, Historical   acetaminophen (TYLENOL EXTRA STRENGTH) 500 mg tablet Take 1,000 mg by mouth every six (6) hours as needed for Pain. Provider, Historical     No Known Allergies    ROS Review of all systems is negative except as noted above in the HPI. I affirm this is a Patient-Initiated Episode with a Patient who has not had a related appointment within my department in the past 7 days or scheduled within the next 24 hours.     Total Time: minutes: 11-20 minutes    Note: not billable if this call serves to triage the patient into an appointment for the relevant concern      Ila Mchugh MD

## 2020-07-06 ENCOUNTER — TELEPHONE (OUTPATIENT)
Dept: CARDIOLOGY CLINIC | Age: 70
End: 2020-07-06

## 2020-07-06 NOTE — TELEPHONE ENCOUNTER
Patient states ad/t to your recommendation and his condition he took a leave of absence due to COVID-19 and wants to know what is your opinion about him returning to work. Patient is on leave until September but wants to know if you think he is able to return work any sooner.  Please advise

## 2020-07-07 NOTE — TELEPHONE ENCOUNTER
Risk of luigi virus is similar to general population. Has valvular heart disease with no CHF or LV dysfunction. Can work with standard precautions including using facemask and good hand hygiene.

## 2020-07-07 NOTE — TELEPHONE ENCOUNTER
Spoke with patient regarding returning to work per Dr. Leslie Aranda, risk of luigi virus is similar to general population. Has valvular heart disease with no CHF or LV dysfunction. Can work with standard precautions including using facemask and good hand hygiene. He voices understanding and acceptance of this advice and will call back if any further questions or concerns.

## 2020-07-14 ENCOUNTER — TELEPHONE (OUTPATIENT)
Dept: FAMILY MEDICINE CLINIC | Age: 70
End: 2020-07-14

## 2020-07-14 DIAGNOSIS — E78.5 DYSLIPIDEMIA: ICD-10-CM

## 2020-07-14 DIAGNOSIS — I10 ESSENTIAL HYPERTENSION: ICD-10-CM

## 2020-07-14 RX ORDER — ATORVASTATIN CALCIUM 40 MG/1
TABLET, FILM COATED ORAL
Qty: 90 TAB | Refills: 1 | Status: SHIPPED | OUTPATIENT
Start: 2020-07-14 | End: 2020-10-28 | Stop reason: SDUPTHER

## 2020-07-14 RX ORDER — BENAZEPRIL HYDROCHLORIDE 40 MG/1
TABLET ORAL
Qty: 90 TAB | Refills: 1 | Status: SHIPPED | OUTPATIENT
Start: 2020-07-14 | End: 2020-10-28 | Stop reason: SDUPTHER

## 2020-07-14 RX ORDER — HYDROCHLOROTHIAZIDE 12.5 MG/1
TABLET ORAL
Qty: 90 TAB | Refills: 1 | Status: SHIPPED | OUTPATIENT
Start: 2020-07-14 | End: 2020-10-28 | Stop reason: DRUGHIGH

## 2020-07-14 RX ORDER — AMLODIPINE BESYLATE 10 MG/1
TABLET ORAL
Qty: 90 TAB | Refills: 1 | Status: SHIPPED | OUTPATIENT
Start: 2020-07-14 | End: 2020-10-28 | Stop reason: SDUPTHER

## 2020-07-14 NOTE — TELEPHONE ENCOUNTER
Patient cannot access his MyChart. He is requesting to speak to the nurse to discuss what's in his chart.

## 2020-07-14 NOTE — TELEPHONE ENCOUNTER
Patient needed advice on how to reset his password. Informed patient it should be an option where he can reset his password and username. Patient states he have a new message he could not view. Advised patient if may be results from urologist or refill response notification. No other concern noted

## 2020-09-09 DIAGNOSIS — G62.9 NEUROPATHY: ICD-10-CM

## 2020-09-10 RX ORDER — PREGABALIN 150 MG/1
CAPSULE ORAL
Qty: 180 CAP | Refills: 1 | Status: SHIPPED | OUTPATIENT
Start: 2020-09-10 | End: 2020-10-28 | Stop reason: SDUPTHER

## 2020-10-21 ENCOUNTER — OFFICE VISIT (OUTPATIENT)
Dept: CARDIOLOGY CLINIC | Age: 70
End: 2020-10-21
Payer: MEDICARE

## 2020-10-21 VITALS
DIASTOLIC BLOOD PRESSURE: 83 MMHG | HEART RATE: 72 BPM | TEMPERATURE: 98.2 F | SYSTOLIC BLOOD PRESSURE: 152 MMHG | HEIGHT: 73 IN | WEIGHT: 214 LBS | BODY MASS INDEX: 28.36 KG/M2

## 2020-10-21 DIAGNOSIS — I25.10 MILD CAD: ICD-10-CM

## 2020-10-21 DIAGNOSIS — I35.1 NONRHEUMATIC AORTIC VALVE INSUFFICIENCY: ICD-10-CM

## 2020-10-21 DIAGNOSIS — I10 ESSENTIAL HYPERTENSION: ICD-10-CM

## 2020-10-21 DIAGNOSIS — E78.5 DYSLIPIDEMIA: ICD-10-CM

## 2020-10-21 DIAGNOSIS — R07.9 CHEST PAIN, UNSPECIFIED TYPE: Primary | ICD-10-CM

## 2020-10-21 PROCEDURE — 3017F COLORECTAL CA SCREEN DOC REV: CPT | Performed by: INTERNAL MEDICINE

## 2020-10-21 PROCEDURE — G8428 CUR MEDS NOT DOCUMENT: HCPCS | Performed by: INTERNAL MEDICINE

## 2020-10-21 PROCEDURE — G8419 CALC BMI OUT NRM PARAM NOF/U: HCPCS | Performed by: INTERNAL MEDICINE

## 2020-10-21 PROCEDURE — G8753 SYS BP > OR = 140: HCPCS | Performed by: INTERNAL MEDICINE

## 2020-10-21 PROCEDURE — G8754 DIAS BP LESS 90: HCPCS | Performed by: INTERNAL MEDICINE

## 2020-10-21 PROCEDURE — G8432 DEP SCR NOT DOC, RNG: HCPCS | Performed by: INTERNAL MEDICINE

## 2020-10-21 PROCEDURE — 1101F PT FALLS ASSESS-DOCD LE1/YR: CPT | Performed by: INTERNAL MEDICINE

## 2020-10-21 PROCEDURE — G8536 NO DOC ELDER MAL SCRN: HCPCS | Performed by: INTERNAL MEDICINE

## 2020-10-21 PROCEDURE — 93000 ELECTROCARDIOGRAM COMPLETE: CPT | Performed by: INTERNAL MEDICINE

## 2020-10-21 PROCEDURE — 99214 OFFICE O/P EST MOD 30 MIN: CPT | Performed by: INTERNAL MEDICINE

## 2020-10-21 NOTE — PROGRESS NOTES
HISTORY OF PRESENT ILLNESS  Eleuterio Zhang is a 69 y.o. male. Hypertension   The history is provided by the patient. This is a chronic problem. The problem occurs every several days. The problem has not changed since onset. Associated symptoms include chest pain. Chest Pain (Angina)    The history is provided by the patient. The pain is at a severity of 2/10. The quality of the pain is described as sharp. The pain does not radiate. Associated symptoms include malaise/fatigue. Pertinent negatives include no claudication, no cough, no diaphoresis, no dizziness, no fever, no hemoptysis, no nausea, no orthopnea, no palpitations, no PND, no sputum production, no vomiting and no weakness. Risk factors include hypertension. His past medical history is significant for HTN. Procedural history includes cardiac catheterization. Pertinent negatives include no echocardiogram and no stress thallium. Review of Systems   Constitutional: Positive for malaise/fatigue. Negative for chills, diaphoresis, fever and weight loss. HENT: Negative for congestion, ear discharge, ear pain, hearing loss, nosebleeds and tinnitus. Eyes: Negative for blurred vision. Respiratory: Negative for cough, hemoptysis, sputum production, wheezing and stridor. Cardiovascular: Positive for chest pain. Negative for palpitations, orthopnea, claudication, leg swelling and PND. Gastrointestinal: Negative for heartburn, nausea and vomiting. Musculoskeletal: Negative for myalgias and neck pain. Skin: Negative for itching and rash. Neurological: Negative for dizziness, tingling, tremors, focal weakness, loss of consciousness and weakness. Psychiatric/Behavioral: Negative for depression and suicidal ideas.      Family History   Problem Relation Age of Onset    No Known Problems Mother     No Known Problems Father        Past Medical History:   Diagnosis Date    Aortic valve insufficiency     Arthritis     Hypertension     Other ill-defined conditions(799.89)     awais       Past Surgical History:   Procedure Laterality Date    HX APPENDECTOMY      HX OTHER SURGICAL Left     Exc. mass foot     HX OTHER SURGICAL Right     release Dequervain's contracture    HX OTHER SURGICAL Left     Removal large nevi behind ear       Social History     Tobacco Use    Smoking status: Former Smoker     Last attempt to quit: 1980     Years since quittin.8    Smokeless tobacco: Never Used   Substance Use Topics    Alcohol use: No       No Known Allergies    Outpatient Medications Marked as Taking for the 10/21/20 encounter (Office Visit) with Jazmine Joseph MD   Medication Sig Dispense Refill    pregabalin (LYRICA) 150 mg capsule TAKE 1 CAPSULE BY MOUTH  TWICE DAILY MAXIMUM DAILY  AMOUNT:300MG 180 Cap 1    benazepriL (LOTENSIN) 40 mg tablet TAKE 1 TABLET BY MOUTH  DAILY 90 Tab 1    amLODIPine (NORVASC) 10 mg tablet TAKE 1 TABLET BY MOUTH  DAILY 90 Tab 1    hydroCHLOROthiazide (HYDRODIURIL) 12.5 mg tablet TAKE 1 TABLET BY MOUTH ONCE DAILY 90 Tab 1    atorvastatin (LIPITOR) 40 mg tablet TAKE 1 TABLET BY MOUTH  DAILY 90 Tab 1    alfuzosin SR (UROXATRAL) 10 mg SR tablet Take 1 Tab by mouth daily (after dinner). 90 Tab 3    travoprost (TRAVATAN Z) 0.004 % ophthalmic solution Administer 1 Drop to both eyes nightly.  acetaminophen (TYLENOL EXTRA STRENGTH) 500 mg tablet Take 1,000 mg by mouth every six (6) hours as needed for Pain. Visit Vitals  BP (!) 152/83   Pulse 72   Temp 98.2 °F (36.8 °C) (Temporal)   Ht 6' 1\" (1.854 m)   Wt 97.1 kg (214 lb)   BMI 28.23 kg/m²     Physical Exam   Constitutional: He is oriented to person, place, and time. He appears well-developed and well-nourished. No distress. HENT:   Head: Atraumatic. Mouth/Throat: No oropharyngeal exudate. Eyes: Conjunctivae are normal. Right eye exhibits no discharge. Left eye exhibits no discharge. No scleral icterus.    Neck: Normal range of motion. Neck supple. No JVD present. No tracheal deviation present. No thyromegaly present. Cardiovascular: Normal rate and regular rhythm. Exam reveals no gallop. Murmur (2/6 systolic murmur best heard at apex/ aortic area with no radiation) heard. Pulmonary/Chest: Effort normal and breath sounds normal. No stridor. He has no wheezes. He has no rales. Abdominal: Soft. There is no abdominal tenderness. There is no rebound. Musculoskeletal: Normal range of motion. General: No tenderness or edema. Lymphadenopathy:     He has no cervical adenopathy. Neurological: He is alert and oriented to person, place, and time. He exhibits normal muscle tone. Skin: Skin is warm. He is not diaphoretic. Psychiatric: He has a normal mood and affect. His behavior is normal.     ekg sinus rhythm with t wave inversion in anterior leads. 10/15/18   ECHO ADULT COMPLETE 10/19/2018 10/19/2018    Narrative · Normal left ventricular cavity size and systolic function (ejection   fraction normal). Moderate concentric hypertrophy observed. The estimated   ejection fraction is 61 - 65%. No regional wall motion abnormality noted. There is mild (grade 1) left ventricular diastolic dysfunction. · Normal right ventricular size and function. · The left atrial cavity size is mildly dilated. LA index is 35.03 ml/m2. · The right atiral cavity size is mildly dilated. · Echogenicity visualized on the right coronary cusp of the aortic valve,   cannot rule out healed vegetation vs. calcification. Moderate aortic valve   regurgitation with an eccentrically directed jet. · There is mild leaflet thickening of the mitral valve leaflets with mild   mitral annular calcification and mild regurgitation. · Mild tricuspid valve regurgitation. Pulmonary arterial systolic pressure   is 39 mmHg. · Mild pulmonic regurgitation. · The aortic root is mildly dilated. · Mild aortic valve sclerosis.  Echogenicity visualized on the right coronary cusp, cannot rule out calcification. Moderate aortic valve   regurgitation is present. · Pulmonary arterial systolic pressure is 97.9 mmHg. · Pulmonary arterial systolic pressure is 97.6 mmHg. Signed by: Dolores Conrad MD     Lexiscan : no ischemia  02/14/19   CARDIAC PROCEDURE 02/14/2019 2/14/2019    Narrative Non obstructive epicardial coronary arteries with preserved LV function. Signed by: Dolores Conrad MD     ASSESSMENT and PLAN    ICD-10-CM ICD-9-CM    1. Chest pain, unspecified type  R07.9 786.50 AMB POC EKG ROUTINE W/ 12 LEADS, INTER & REP   2. Nonrheumatic aortic valve insufficiency  I35.1 424.1    3. Dyslipidemia  E78.5 272.4    4. Essential hypertension  I10 401.9    5. Mild CAD  I25.10 414.00      Orders Placed This Encounter    AMB POC EKG ROUTINE W/ 12 LEADS, INTER & REP     Order Specific Question:   Reason for Exam:     Answer:   chest pain     Follow-up and Dispositions    · Return in about 6 months (around 4/21/2021). current treatment plan is effective, no change in therapy  use of aspirin to prevent MI and TIA's discussed. Patient with risk factors seen for atypical chest pain.  ekg is abnormal with concerns for ischemia  No ischemia on stress test  Normal LV function with mild to moderate aortic regurgitation. No vegetation noted on the aortic valve. Underwent recent cardiac cath - mild CAD with normal LVEF. Seen for f/u- c/o atypical chest pain- ekg no new ischemic changes. bp elevated-- advised salt restriction and home bp monitoring and advised to drop off readings in 2 weeks.

## 2020-10-21 NOTE — PROGRESS NOTES
1. Have you been to the ER, urgent care clinic since your last visit? Hospitalized since your last visit?     no  2. Have you seen or consulted any other health care providers outside of the [address] [place] [address] since your last visit? Include any pap smears or colon screening.       No

## 2020-10-28 ENCOUNTER — HOSPITAL ENCOUNTER (OUTPATIENT)
Dept: LAB | Age: 70
Discharge: HOME | End: 2020-10-28
Payer: MEDICARE

## 2020-10-28 ENCOUNTER — OFFICE VISIT (OUTPATIENT)
Dept: FAMILY MEDICINE CLINIC | Age: 70
End: 2020-10-28
Payer: MEDICARE

## 2020-10-28 VITALS
HEART RATE: 74 BPM | SYSTOLIC BLOOD PRESSURE: 143 MMHG | TEMPERATURE: 97.9 F | DIASTOLIC BLOOD PRESSURE: 71 MMHG | WEIGHT: 214 LBS | RESPIRATION RATE: 18 BRPM | OXYGEN SATURATION: 98 % | HEIGHT: 73 IN | BODY MASS INDEX: 28.36 KG/M2

## 2020-10-28 DIAGNOSIS — R39.9 LOWER URINARY TRACT SYMPTOMS (LUTS): ICD-10-CM

## 2020-10-28 DIAGNOSIS — G62.9 NEUROPATHY: ICD-10-CM

## 2020-10-28 DIAGNOSIS — E78.5 DYSLIPIDEMIA: ICD-10-CM

## 2020-10-28 DIAGNOSIS — M25.551 RIGHT HIP PAIN: ICD-10-CM

## 2020-10-28 DIAGNOSIS — Z71.89 ACP (ADVANCE CARE PLANNING): ICD-10-CM

## 2020-10-28 DIAGNOSIS — Z00.00 MEDICARE ANNUAL WELLNESS VISIT, SUBSEQUENT: ICD-10-CM

## 2020-10-28 DIAGNOSIS — Z71.89 ADVANCED DIRECTIVES, COUNSELING/DISCUSSION: ICD-10-CM

## 2020-10-28 DIAGNOSIS — I10 ESSENTIAL HYPERTENSION: Primary | ICD-10-CM

## 2020-10-28 DIAGNOSIS — Z13.31 SCREENING FOR DEPRESSION: ICD-10-CM

## 2020-10-28 LAB
CHOLEST SERPL-MCNC: 145 MG/DL
HDLC SERPL-MCNC: 68 MG/DL (ref 40–60)
HDLC SERPL: 2.1 {RATIO} (ref 0–5)
LDLC SERPL CALC-MCNC: 66.8 MG/DL (ref 0–100)
LIPID PROFILE,FLP: ABNORMAL
TRIGL SERPL-MCNC: 51 MG/DL (ref ?–150)
VLDLC SERPL CALC-MCNC: 10.2 MG/DL

## 2020-10-28 PROCEDURE — 36415 COLL VENOUS BLD VENIPUNCTURE: CPT | Performed by: FAMILY MEDICINE

## 2020-10-28 PROCEDURE — G8427 DOCREV CUR MEDS BY ELIG CLIN: HCPCS | Performed by: FAMILY MEDICINE

## 2020-10-28 PROCEDURE — G8419 CALC BMI OUT NRM PARAM NOF/U: HCPCS | Performed by: FAMILY MEDICINE

## 2020-10-28 PROCEDURE — G8753 SYS BP > OR = 140: HCPCS | Performed by: FAMILY MEDICINE

## 2020-10-28 PROCEDURE — 80061 LIPID PANEL: CPT

## 2020-10-28 PROCEDURE — 99214 OFFICE O/P EST MOD 30 MIN: CPT | Performed by: FAMILY MEDICINE

## 2020-10-28 PROCEDURE — G0439 PPPS, SUBSEQ VISIT: HCPCS | Performed by: FAMILY MEDICINE

## 2020-10-28 PROCEDURE — G8754 DIAS BP LESS 90: HCPCS | Performed by: FAMILY MEDICINE

## 2020-10-28 PROCEDURE — G8536 NO DOC ELDER MAL SCRN: HCPCS | Performed by: FAMILY MEDICINE

## 2020-10-28 PROCEDURE — 3017F COLORECTAL CA SCREEN DOC REV: CPT | Performed by: FAMILY MEDICINE

## 2020-10-28 PROCEDURE — 36415 COLL VENOUS BLD VENIPUNCTURE: CPT

## 2020-10-28 PROCEDURE — 1101F PT FALLS ASSESS-DOCD LE1/YR: CPT | Performed by: FAMILY MEDICINE

## 2020-10-28 PROCEDURE — 99497 ADVNCD CARE PLAN 30 MIN: CPT | Performed by: FAMILY MEDICINE

## 2020-10-28 PROCEDURE — G8510 SCR DEP NEG, NO PLAN REQD: HCPCS | Performed by: FAMILY MEDICINE

## 2020-10-28 RX ORDER — AMLODIPINE BESYLATE 10 MG/1
TABLET ORAL
Qty: 90 TAB | Refills: 1 | Status: SHIPPED | OUTPATIENT
Start: 2020-10-28 | End: 2021-05-03

## 2020-10-28 RX ORDER — ATORVASTATIN CALCIUM 40 MG/1
TABLET, FILM COATED ORAL
Qty: 90 TAB | Refills: 1 | Status: SHIPPED | OUTPATIENT
Start: 2020-10-28 | End: 2021-05-03

## 2020-10-28 RX ORDER — HYDROCHLOROTHIAZIDE 25 MG/1
25 TABLET ORAL DAILY
Qty: 90 TAB | Refills: 1 | Status: SHIPPED | OUTPATIENT
Start: 2020-10-28 | End: 2021-03-08

## 2020-10-28 RX ORDER — PREGABALIN 150 MG/1
CAPSULE ORAL
Qty: 180 CAP | Refills: 1 | Status: SHIPPED | OUTPATIENT
Start: 2020-10-28 | End: 2021-06-29 | Stop reason: ALTCHOICE

## 2020-10-28 RX ORDER — HYDROCHLOROTHIAZIDE 12.5 MG/1
TABLET ORAL
Qty: 90 TAB | Refills: 1 | Status: CANCELLED | OUTPATIENT
Start: 2020-10-28

## 2020-10-28 RX ORDER — POTASSIUM CHLORIDE 750 MG/1
10 TABLET, EXTENDED RELEASE ORAL DAILY
Qty: 90 TAB | Refills: 1 | Status: SHIPPED | OUTPATIENT
Start: 2020-10-28 | End: 2021-03-08

## 2020-10-28 RX ORDER — BENAZEPRIL HYDROCHLORIDE 40 MG/1
TABLET ORAL
Qty: 90 TAB | Refills: 1 | Status: SHIPPED | OUTPATIENT
Start: 2020-10-28 | End: 2021-05-03

## 2020-10-28 NOTE — PATIENT INSTRUCTIONS
Medicare Wellness Visit, Male    The best way to live healthy is to have a lifestyle where you eat a well-balanced diet, exercise regularly, limit alcohol use, and quit all forms of tobacco/nicotine, if applicable. Regular preventive services are another way to keep healthy. Preventive services (vaccines, screening tests, monitoring & exams) can help personalize your care plan, which helps you manage your own care. Screening tests can find health problems at the earliest stages, when they are easiest to treat. [facility] follows the current, evidence-based guidelines published by the [facility] [employer] ([facility]) when recommending preventive services for our patients. Because we follow these guidelines, sometimes recommendations change over time as research supports it. (For example, a prostate screening blood test is no longer routinely recommended for men with no symptoms). Of course, you and your doctor may decide to screen more often for some diseases, based on your risk and co-morbidities (chronic disease you are already diagnosed with). Preventive services for you include:  - Medicare offers their members a free annual wellness visit, which is time for you and your primary care provider to discuss and plan for your preventive service needs. Take advantage of this benefit every year!  -All adults over age 72 should receive the recommended pneumonia vaccines. Current USPSTF guidelines recommend a series of two vaccines for the best pneumonia protection.   -All adults should have a flu vaccine yearly and tetanus vaccine every 10 years.  -All adults age 48 and older should receive the shingles vaccines (series of two vaccines).        -All adults age 38-68 who are overweight should have a diabetes screening test once every three years.   -Other screening tests & preventive services for persons with diabetes include: an eye exam to screen for diabetic retinopathy, a kidney function test, a foot exam, and stricter control over your cholesterol.   -Cardiovascular screening for adults with routine risk involves an electrocardiogram (ECG) at intervals determined by the provider.   -Colorectal cancer screening should be done for adults age 54-65 with no increased risk factors for colorectal cancer. There are a number of acceptable methods of screening for this type of cancer. Each test has its own benefits and drawbacks. Discuss with your provider what is most appropriate for you during your annual wellness visit. The different tests include: colonoscopy (considered the best screening method), a fecal occult blood test, a fecal DNA test, and sigmoidoscopy.  -All adults born between [facility] should be screened once for Hepatitis C.  -An Abdominal Aortic Aneurysm (AAA) Screening is recommended for men age 73-68 who has ever smoked in their lifetime. Here is a list of your current Health Maintenance items (your personalized list of preventive services) with a due date:  Health Maintenance Due   Topic Date Due    Annual Well Visit  09/11/2020    Cholesterol Test   09/11/2020    Glaucoma Screening   11/15/2020     Medicare Wellness Visit, Male    The best way to live healthy is to have a lifestyle where you eat a well-balanced diet, exercise regularly, limit alcohol use, and quit all forms of tobacco/nicotine, if applicable. Regular preventive services are another way to keep healthy. Preventive services (vaccines, screening tests, monitoring & exams) can help personalize your care plan, which helps you manage your own care. Screening tests can find health problems at the earliest stages, when they are easiest to treat. [facility] follows the current, evidence-based guidelines published by the [place] [employer] (USPSTF) when recommending preventive services for our patients.  Because we follow these guidelines, sometimes recommendations change over time as research supports it. (For example, a prostate screening blood test is no longer routinely recommended for men with no symptoms). Of course, you and your doctor may decide to screen more often for some diseases, based on your risk and co-morbidities (chronic disease you are already diagnosed with). Preventive services for you include:  - Medicare offers their members a free annual wellness visit, which is time for you and your primary care provider to discuss and plan for your preventive service needs. Take advantage of this benefit every year!  -All adults over age 72 should receive the recommended pneumonia vaccines. Current USPSTF guidelines recommend a series of two vaccines for the best pneumonia protection.   -All adults should have a flu vaccine yearly and tetanus vaccine every 10 years.  -All adults age 48 and older should receive the shingles vaccines (series of two vaccines). -All adults age 38-68 who are overweight should have a diabetes screening test once every three years.   -Other screening tests & preventive services for persons with diabetes include: an eye exam to screen for diabetic retinopathy, a kidney function test, a foot exam, and stricter control over your cholesterol.   -Cardiovascular screening for adults with routine risk involves an electrocardiogram (ECG) at intervals determined by the provider.   -Colorectal cancer screening should be done for adults age 54-65 with no increased risk factors for colorectal cancer. There are a number of acceptable methods of screening for this type of cancer. Each test has its own benefits and drawbacks. Discuss with your provider what is most appropriate for you during your annual wellness visit.  The different tests include: colonoscopy (considered the best screening method), a fecal occult blood test, a fecal DNA test, and sigmoidoscopy.  -All adults born between [facility] should be screened once for Hepatitis C.  -An Abdominal Aortic Aneurysm (AAA) Screening is recommended for men age 73-68 who has ever smoked in their lifetime.      Here is a list of your current Health Maintenance items (your personalized list of preventive services) with a due date:  Health Maintenance Due   Topic Date Due    Annual Well Visit  09/11/2020    Cholesterol Test   09/11/2020

## 2020-10-28 NOTE — ACP (ADVANCE CARE PLANNING)
Advance Care Planning       Advance Care Planning (ACP) Physician/NP/PA (Provider) Conversation        Date of ACP Conversation: 10/28/2020    Conversation Conducted with:   Patient with Decision Making [address] Crowell Maker:    Current Designated Health Care Decision Maker:       If no Authorized Decision Maker has previously been identified, then patient chooses [place]:  \"Who would you like to name as your primary health care decision-maker? \"    Name: Constantino Mcgregor   Relationship: wife Phone number: [phone]  Jacey Juan this person be reached easily? \" YES    Care Preferences:    Hospitalization: \"If your health worsens and it becomes clear that your chance of recovery is unlikely, what would your preference be regarding hospitalization? \"  If the patient would want hospitalization, answer \"yes\". If the patient would prefer comfort-focused treatment without hospitalization, answer \"no\". yes      Ventilation: \"If you were in your present state of health and suddenly became very ill and were unable to breathe on your own, what would your preference be about the use of a ventilator (breathing machine) if it was available to you? \"    If patient would desire the use of a ventilator (breathing machine), answer \"yes\", if not answer \"no\":yes    \"If your health worsens and it becomes clear that your chance of recovery is unlikely, what would your preference be about the use of a ventilator (breathing machine) if it was available to you? \"   yes      Resuscitation:  \"CPR works best to restart the heart when there is a sudden event, like a heart attack, in someone who is otherwise healthy. Unfortunately, CPR does not typically restart the heart for people who have serious health conditions or who are very sick. \"    \"In the event your heart stopped as a result of an underlying serious health condition, would you want attempts to be made to restart your heart (answer \"yes\" for attempt to resuscitate) or would you prefer a natural death (answer \"no\" for do not attempt to resuscitate)? \"   yes      Conversation Outcomes / Follow-Up Plan:   Recommended completion of Advance Directive      Length of Voluntary ACP Conversation in minutes:  16 minutes      Alma Pineda MD

## 2020-10-28 NOTE — PROGRESS NOTES
HPI  Uziel Salazar comes in for f/u care. HTN: Patient has hypertension. He denies headache, changes in vision or focal weakness. Blood pressure is like elevated today. He is on HCTZ 12.5 mg daily and benazepril. I will increase HCTZ to 25 mg daily. I will add potassium supplementation. I will send in a refill of his blood pressure medications. Hip pain: Patient has pain right hip. Denies any trauma to the hip. We did an x-ray of the hip and this was normal.  Pain comes with sitting and moving. He has taken Tylenol for the pain with transient relief. No swelling of the hip. He has discomfort on internal and external rotation of the hip. I will refer him to the orthopedist for an evaluation. LUTS: Patient has lower urinary tract  symptoms with hesitancy and incontinence. He is on alfuzosin. Symptoms still persist.  He denies dysuria or hematuria. He would like referral to be seen by the urologist.  Referral is placed. Neuropathy: Patient has neuropathy with numbness and tingling of the lower extremities. Lyrica helps with the hip pain at times. It also helps with the neuropathy symptoms. He does need a refill of medication. Prescription will be sent in. Dyslipidemia: Patient has dyslipidemia. He is on atorvastatin. He needs a refill of medication prescription is sent in. We will check his lipid panel today. Cardiac: Patient has a history of aortic valve insufficiency. He did have some chest pain and was seen by the cardiologist.  Chest pain was myofascial in nature. Currently feels stable. Denies diaphoresis, chest pain, shortness of breath. Overweight: Patient has a BMI of 28.23. He should intensify lifestyle and dietary modification.       Past Medical History  Past Medical History:   Diagnosis Date    Aortic valve insufficiency     Arthritis     Hypertension     Other ill-defined conditions(956.89)     murmer       Surgical History  Past Surgical History:   Procedure Laterality Date    HX APPENDECTOMY      HX OTHER SURGICAL Left     Exc. mass foot     HX OTHER SURGICAL Right     release Dequervain's contracture    HX OTHER SURGICAL Left     Removal large nevi behind ear        Medications  Current Outpatient Medications   Medication Sig Dispense Refill    benazepriL (LOTENSIN) 40 mg tablet TAKE 1 TABLET BY MOUTH  DAILY 90 Tab 1    amLODIPine (NORVASC) 10 mg tablet TAKE 1 TABLET BY MOUTH  DAILY 90 Tab 1    atorvastatin (LIPITOR) 40 mg tablet TAKE 1 TABLET BY MOUTH  DAILY 90 Tab 1    pregabalin (LYRICA) 150 mg capsule TAKE 1 CAPSULE BY MOUTH  TWICE DAILY MAXIMUM DAILY  AMOUNT:300MG 180 Cap 1    hydroCHLOROthiazide (HYDRODIURIL) 25 mg tablet Take 1 Tab by mouth daily. 90 Tab 1    potassium chloride (KLOR-CON) 10 mEq tablet Take 1 Tab by mouth daily. 90 Tab 1    alfuzosin SR (UROXATRAL) 10 mg SR tablet Take 1 Tab by mouth daily (after dinner). 90 Tab 3    travoprost (TRAVATAN Z) 0.004 % ophthalmic solution Administer 1 Drop to both eyes nightly.  acetaminophen (TYLENOL EXTRA STRENGTH) 500 mg tablet Take 1,000 mg by mouth every six (6) hours as needed for Pain.          Allergies  No Known Allergies    Family History  Family History   Problem Relation Age of Onset    No Known Problems Mother     No Known Problems Father        Social History  Social History     Socioeconomic History    Marital status:      Spouse name: Not on file    Number of children: Not on file    Years of education: Not on file    Highest education level: Not on file   Occupational History    Not on file   Social Needs    Financial resource strain: Not on file    Food insecurity     Worry: Not on file     Inability: Not on file    Transportation needs     Medical: Not on file     Non-medical: Not on file   Tobacco Use    Smoking status: Former Smoker     Last attempt to quit: 1980     Years since quittin.8    Smokeless tobacco: Never Used   Substance and Sexual Activity    Alcohol use: No    Drug use: No     Comment: H/O in the past Marijuana    Sexual activity: Not on file   Lifestyle    Physical activity     Days per week: Not on file     Minutes per session: Not on file    Stress: Not on file   Relationships    Social connections     Talks on phone: Not on file     Gets together: Not on file     Attends [employer] service: Not on file     Active member of club or organization: Not on file     Attends meetings of clubs or organizations: Not on file     Relationship status: Not on file    Intimate partner violence     Fear of current or ex partner: Not on file     Emotionally abused: Not on file     Physically abused: Not on file     Forced sexual activity: Not on file   Other Topics Concern    Not on file   Social History Narrative    Not on file       Review of Systems  Review of Systems - Review of all systems is negative except as noted above in the HPI.     Vital Signs  Visit Vitals  BP (!) 143/71 (BP 1 Location: Left arm, BP Patient Position: Sitting)   Pulse 74   Temp 97.9 °F (36.6 °C) (Oral)   Resp 18   Ht 6' 1\" (1.854 m)   Wt 214 lb (97.1 kg)   SpO2 98%   BMI 28.23 kg/m²         Physical Exam  Physical Examination: General appearance - alert, well appearing, and in no distress, oriented to person, place, and time, acyanotic, in no respiratory distress and well hydrated  Mental status - alert, oriented to person, place, and time, affect appropriate to mood  Mouth - mucous membranes moist, pharynx normal without lesions  Neck - supple, no significant adenopathy  Lymphatics - no palpable lymphadenopathy, no hepatosplenomegaly  Chest - clear to auscultation, no wheezes, rales or rhonchi, symmetric air entry  Heart - systolic murmur 2/6 at 2nd right intercostal space  Abdomen - soft, nontender, nondistended, no masses or organomegaly  Back exam - limited range of motion  Neurological - abnormal neurological exam unchanged from prior examinations  Musculoskeletal -discomfort right hip to internal and external rotation. Extremities - no pedal edema noted, intact peripheral pulses      Results  Results for orders placed or performed in visit on 06/29/20   PROSTATE SPECIFIC ANTIGEN, TOTAL (PSA)   Result Value Ref Range    Prostate Specific Ag 3.44 0.00 - 4.00 ng/mL       ASSESSMENT and PLAN    ICD-10-CM ICD-9-CM    1. Essential hypertension  I10 401.9 benazepriL (LOTENSIN) 40 mg tablet      amLODIPine (NORVASC) 10 mg tablet      hydroCHLOROthiazide (HYDRODIURIL) 25 mg tablet      potassium chloride (KLOR-CON) 10 mEq tablet   2. Dyslipidemia  E78.5 272.4 atorvastatin (LIPITOR) 40 mg tablet      LIPID PANEL      COLLECTION VENOUS BLOOD,VENIPUNCTURE   3. Neuropathy  G62.9 355.9 pregabalin (LYRICA) 150 mg capsule   4. Lower urinary tract symptoms (LUTS)  R39.9 788.99 REFERRAL TO UROLOGY   5. Right hip pain  M25.551 719.45 REFERRAL TO ORTHOPEDICS   6. Medicare annual wellness visit, subsequent  Z00.00 V70.0    7. ACP (advance care planning)  Z71.89 V65.49 ADVANCE CARE PLANNING FIRST 30 MINS   8. Advanced directives, counseling/discussion  Z71.89 V65.49 ADVANCE CARE PLANNING FIRST 30 MINS      FULL CODE   9. Screening for depression  Z13.31 V79.0 [address]     lab results and schedule of future lab studies reviewed with patient  reviewed diet, exercise and weight control  cardiovascular risk and specific lipid/LDL goals reviewed  reviewed medications and side effects in detail  radiology results and schedule of future radiology studies reviewed with patient      I have discussed the diagnosis with the patient and the intended plan of care as seen in the above orders. The patient has received an after-visit summary and questions were answered concerning future plans. I have discussed medication, side effects, and warnings with the patient in detail.  The patient verbalized understanding and is in agreement with the plan of care. The patient will contact the office with any additional concerns. Maikol Hackett MD    PLEASE NOTE:   This document has been produced using voice recognition software.  Unrecognized errors in transcription may be present

## 2020-10-28 NOTE — PROGRESS NOTES
Chief Complaint   Patient presents with    Annual Wellness Visit    Hip Pain     right hip      1. Have you been to the ER, urgent care clinic since your last visit? Hospitalized since your last visit? No    2. Have you seen or consulted any other health care providers outside of the [address] [place] [address] since your last visit? Include any pap smears or colon screening. No  This is the Subsequent Medicare Annual Wellness Exam, performed 12 months or more after the Initial AWV or the last Subsequent AWV    I have reviewed the patient's medical history in detail and updated the computerized patient record.      History   Kash Clarke comes in for Medicare wellness exam.    Patient Active Problem List   Diagnosis Code    History of colon polyps Z86.010    Family history of colon cancer Z80.0    Benign prostatic hyperplasia with incomplete bladder emptying N40.1, R39.14    Lower urinary tract symptoms (LUTS) R39.9    Essential hypertension I10    Polyp of colon K63.5    Dyslipidemia E78.5    Abnormal EKG R94.31    Chest pain R07.9    Peyronie's syndrome N48.6    Neuropathy G62.9     Past Medical History:   Diagnosis Date    Aortic valve insufficiency     Arthritis     Hypertension     Other ill-defined conditions(799.89)     murmer      Past Surgical History:   Procedure Laterality Date    HX APPENDECTOMY  1961    HX OTHER SURGICAL Left 1990's    Exc. mass foot     HX OTHER SURGICAL Right 1980    release Dequervain's contracture    HX OTHER SURGICAL Left 2014    Removal large nevi behind ear     Current Outpatient Medications   Medication Sig Dispense Refill    pregabalin (LYRICA) 150 mg capsule TAKE 1 CAPSULE BY MOUTH  TWICE DAILY MAXIMUM DAILY  AMOUNT:300MG 180 Cap 1    benazepriL (LOTENSIN) 40 mg tablet TAKE 1 TABLET BY MOUTH  DAILY 90 Tab 1    amLODIPine (NORVASC) 10 mg tablet TAKE 1 TABLET BY MOUTH  DAILY 90 Tab 1    hydroCHLOROthiazide (HYDRODIURIL) 12.5 mg tablet TAKE 1 TABLET BY MOUTH ONCE DAILY 90 Tab 1    atorvastatin (LIPITOR) 40 mg tablet TAKE 1 TABLET BY MOUTH  DAILY 90 Tab 1    alfuzosin SR (UROXATRAL) 10 mg SR tablet Take 1 Tab by mouth daily (after dinner). 90 Tab 3    travoprost (TRAVATAN Z) 0.004 % ophthalmic solution Administer 1 Drop to both eyes nightly.  acetaminophen (TYLENOL EXTRA STRENGTH) 500 mg tablet Take 1,000 mg by mouth every six (6) hours as needed for Pain.  avanafiL (STENDRA) 200 mg tab tablet Take 1 Tab by mouth as needed for Other. 10 Tab 3    finasteride (PROSCAR) 5 mg tablet TAKE 1 TABLET BY MOUTH  DAILY 90 Tab 3     No Known Allergies    Family History   Problem Relation Age of Onset    No Known Problems Mother     No Known Problems Father      Social History     Tobacco Use    Smoking status: Former Smoker     Last attempt to quit: 1980     Years since quittin.8    Smokeless tobacco: Never Used   Substance Use Topics    Alcohol use: No       Depression Risk Factor Screening:     3 most recent PHQ Screens 10/28/2020   Little interest or pleasure in doing things Not at all   Feeling down, depressed, irritable, or hopeless Not at all   Total Score PHQ 2 0       Alcohol Risk Screen   Do you average more than 1 drink per night or more than 7 drinks a week: No    In the past three months have you have had more than 4 drinks containing alcohol on one occasion: No        Functional Ability and Level of Safety:   1. Was the patient's timed Up and GO test unsteady or longer than 30 seconds? Yes  2. Does the patient need help with the phone, transportation, shopping, preparing meals, housework, laundry, medications or managing money? No  3. Does the patients' home have rugs in the hallway, lack grab bars in the bathroom, lack handrails on the stairs or have poor lighting? No  4. Have you noticed any hearing difficulties? No  Hearing Evaluation:    Hearing: Hearing is good. Activities of Daily Living:   The home contains: no safety equipment. Patient does total self care     Ambulation: with no difficulty     Fall Risk:  Fall Risk Assessment, last 12 mths 10/28/2020   Able to walk? Yes   Fall in past 12 months? No   Fall with injury? -   Number of falls in past 12 months -   Fall Risk Score -     Abuse Screen:  Patient is not abused       Cognitive Screening   Has your family/caregiver stated any concerns about your memory: no     Cognitive Screening: Normal - Verbal Fluency Test    Patient Care Team   Patient Care Team:  Mark Otero MD as PCP - General (Family Medicine)  Mark Otero MD as PCP - [address] [place] [address]  [facility] Provider  Denise Pat MD as Physician (Urology)  Candace Greer MD as Physician (Cardiology)    Assessment/Plan   Education and counseling provided:  1. Medicare annual wellness visit, subsequent    2. ACP (advance care planning)  - ADVANCE CARE PLANNING FIRST 30 MINS    3. Advanced directives, counseling/discussion  - ADVANCE CARE PLANNING FIRST 27 MINS  - FULL CODE    4. Screening for depression  - [place] Maintenance Due   Topic Date Due    Medicare Yearly Exam  2020    Lipid Screen  2020    GLAUCOMA SCREENING Q2Y  11/15/2020     I have discussed the diagnosis with the patient and the intended plan of care as seen in the above orders. The patient has received an after-visit summary and questions were answered concerning future plans. I have discussed medication, side effects, and warnings with the patient in detail. The patient verbalized understanding and is in agreement with the plan of care. The patient will contact the office with any additional concerns. Personalized preventative plan of care was discussed, printed and given to patient.     Ronnie Jaimes MD

## 2020-11-19 ENCOUNTER — OFFICE VISIT (OUTPATIENT)
Dept: ORTHOPEDIC SURGERY | Age: 70
End: 2020-11-19
Payer: MEDICARE

## 2020-11-19 VITALS
WEIGHT: 214.6 LBS | HEART RATE: 76 BPM | SYSTOLIC BLOOD PRESSURE: 141 MMHG | BODY MASS INDEX: 28.44 KG/M2 | TEMPERATURE: 96.9 F | OXYGEN SATURATION: 97 % | DIASTOLIC BLOOD PRESSURE: 79 MMHG | HEIGHT: 73 IN

## 2020-11-19 DIAGNOSIS — R20.2 NUMBNESS AND TINGLING OF RIGHT LOWER EXTREMITY: ICD-10-CM

## 2020-11-19 DIAGNOSIS — M70.61 GREATER TROCHANTERIC BURSITIS OF RIGHT HIP: ICD-10-CM

## 2020-11-19 DIAGNOSIS — R20.0 NUMBNESS AND TINGLING OF RIGHT LOWER EXTREMITY: ICD-10-CM

## 2020-11-19 DIAGNOSIS — M54.50 LUMBAR PAIN: ICD-10-CM

## 2020-11-19 DIAGNOSIS — M25.551 RIGHT HIP PAIN: Primary | ICD-10-CM

## 2020-11-19 PROCEDURE — G8419 CALC BMI OUT NRM PARAM NOF/U: HCPCS | Performed by: ORTHOPAEDIC SURGERY

## 2020-11-19 PROCEDURE — 1101F PT FALLS ASSESS-DOCD LE1/YR: CPT | Performed by: ORTHOPAEDIC SURGERY

## 2020-11-19 PROCEDURE — G8427 DOCREV CUR MEDS BY ELIG CLIN: HCPCS | Performed by: ORTHOPAEDIC SURGERY

## 2020-11-19 PROCEDURE — 99214 OFFICE O/P EST MOD 30 MIN: CPT | Performed by: ORTHOPAEDIC SURGERY

## 2020-11-19 PROCEDURE — 73502 X-RAY EXAM HIP UNI 2-3 VIEWS: CPT | Performed by: ORTHOPAEDIC SURGERY

## 2020-11-19 PROCEDURE — 3017F COLORECTAL CA SCREEN DOC REV: CPT | Performed by: ORTHOPAEDIC SURGERY

## 2020-11-19 PROCEDURE — G8536 NO DOC ELDER MAL SCRN: HCPCS | Performed by: ORTHOPAEDIC SURGERY

## 2020-11-19 PROCEDURE — G8510 SCR DEP NEG, NO PLAN REQD: HCPCS | Performed by: ORTHOPAEDIC SURGERY

## 2020-11-19 PROCEDURE — 20610 DRAIN/INJ JOINT/BURSA W/O US: CPT | Performed by: ORTHOPAEDIC SURGERY

## 2020-11-19 PROCEDURE — G8754 DIAS BP LESS 90: HCPCS | Performed by: ORTHOPAEDIC SURGERY

## 2020-11-19 PROCEDURE — G8753 SYS BP > OR = 140: HCPCS | Performed by: ORTHOPAEDIC SURGERY

## 2020-11-19 RX ORDER — BETAMETHASONE SODIUM PHOSPHATE AND BETAMETHASONE ACETATE 3; 3 MG/ML; MG/ML
6 INJECTION, SUSPENSION INTRA-ARTICULAR; INTRALESIONAL; INTRAMUSCULAR; SOFT TISSUE ONCE
Status: COMPLETED | OUTPATIENT
Start: 2020-11-19 | End: 2020-11-19

## 2020-11-19 RX ADMIN — BETAMETHASONE SODIUM PHOSPHATE AND BETAMETHASONE ACETATE 6 MG: 3; 3 INJECTION, SUSPENSION INTRA-ARTICULAR; INTRALESIONAL; INTRAMUSCULAR; SOFT TISSUE at 08:41

## 2020-11-19 NOTE — PROGRESS NOTES
Patient: Fabi Espinal                MRN: [id number]       SSN: [id number]  YOB: 1950        AGE: 69 y.o. SEX: male  Body mass index is 28.31 kg/m². PCP: Mayda Chapa MD  11/19/20  Mr. Moises Sagastume is seen today for opinion and advice with regards to his right hip and low back pain with radiculopathy down the right leg. The pain in the hip is occasionally in the groin but is mostly laterally based and also points to the low back pain radiates down the right leg depending if he standing for period of time cold weather and prolonged walking and sitting can also bother it he does is involved with healthcare. He has been taking Lyrica for about a year or so which does tend to help some of the radiculopathy no bowel or bladder problems and he denies fevers chills night sweats or weight loss otherwise feeling well. The examination today he walks with just a slightly antalgic gait going to the right side which tends to smooth off to normal gait pattern in his low back somewhat tender the left hip rotates normally the right hip is just a touch stiff with internal rotation but mainly well-preserved. He has tenderness over the greater trochanter on the right side as well as the low back and has decreased sensation to L4 to a lesser degree L5 on the right side and straight leg raise is equivocal with some radiculopathy extending to the level of the knee. X-rays today on November 19, 2020 including AP pelvis AP and lateral of the right hip including the low lumbar spine he has multiple level degenerative disc disease of the lumbar spine and a 8 mm cystic area in the right femoral head no acute fractures mild arthritis noted of the hip. Overall impression is trochanteric bursitis of the right hip cystic area in the right femoral head to be evaluated radicular syndrome for MRI of the lumbar spine.       REVIEW OF SYSTEMS:      CON: negative  EYE: negative   ENT: negative  RESP: negative  GI:    negative   :  negative  MSK: Positive  A twelve point review of systems was completed, positives noted and all other systems were reviewed and are negative          Past Medical History:   Diagnosis Date    Aortic valve insufficiency     Arthritis     Hypertension     Other ill-defined conditions(129.89)     murmer       Family History   Problem Relation Age of Onset    No Known Problems Mother     No Known Problems Father        Current Outpatient Medications   Medication Sig Dispense Refill    benazepriL (LOTENSIN) 40 mg tablet TAKE 1 TABLET BY MOUTH  DAILY 90 Tab 1    amLODIPine (NORVASC) 10 mg tablet TAKE 1 TABLET BY MOUTH  DAILY 90 Tab 1    atorvastatin (LIPITOR) 40 mg tablet TAKE 1 TABLET BY MOUTH  DAILY 90 Tab 1    pregabalin (LYRICA) 150 mg capsule TAKE 1 CAPSULE BY MOUTH  TWICE DAILY MAXIMUM DAILY  AMOUNT:300MG 180 Cap 1    hydroCHLOROthiazide (HYDRODIURIL) 25 mg tablet Take 1 Tab by mouth daily. 90 Tab 1    potassium chloride (KLOR-CON) 10 mEq tablet Take 1 Tab by mouth daily. 90 Tab 1    alfuzosin SR (UROXATRAL) 10 mg SR tablet Take 1 Tab by mouth daily (after dinner). 90 Tab 3    travoprost (TRAVATAN Z) 0.004 % ophthalmic solution Administer 1 Drop to both eyes nightly.  acetaminophen (TYLENOL EXTRA STRENGTH) 500 mg tablet Take 1,000 mg by mouth every six (6) hours as needed for Pain.        Current Facility-Administered Medications   Medication Dose Route Frequency Provider Last Rate Last Dose    betamethasone (CELESTONE) injection 6 mg  6 mg IntraBURSal ONCE Hira Herndon MD           No Known Allergies    Past Surgical History:   Procedure Laterality Date    HX APPENDECTOMY  1961    HX OTHER SURGICAL Left 1990's    Exc. mass foot     HX OTHER SURGICAL Right 1980    release Dequervain's contracture    HX OTHER SURGICAL Left 2014    Removal large nevi behind ear       Social History     Socioeconomic History    Marital status:  Spouse name: Not on file    Number of children: Not on file    Years of education: Not on file    Highest education level: Not on file   Occupational History    Not on file   Social Needs    Financial resource strain: Not on file    Food insecurity     Worry: Not on file     Inability: Not on file    Transportation needs     Medical: Not on file     Non-medical: Not on file   Tobacco Use    Smoking status: Former Smoker     Last attempt to quit: 1980     Years since quittin.9    Smokeless tobacco: Never Used   Substance and Sexual Activity    Alcohol use: No    Drug use: No     Comment: H/O in the past Marijuana    Sexual activity: Not on file   Lifestyle    Physical activity     Days per week: Not on file     Minutes per session: Not on file    Stress: Not on file   Relationships    Social connections     Talks on phone: Not on file     Gets together: Not on file     Attends Protestant service: Not on file     Active member of club or organization: Not on file     Attends meetings of clubs or organizations: Not on file     Relationship status: Not on file    Intimate partner violence     Fear of current or ex partner: Not on file     Emotionally abused: Not on file     Physically abused: Not on file     Forced sexual activity: Not on file   Other Topics Concern    Not on file   Social History Narrative    Not on file       Visit Vitals  BP (!) 141/79 (BP 1 Location: Left arm, BP Patient Position: Sitting)   Pulse 76   Temp 96.9 °F (36.1 °C) (Temporal)   Ht 6' 1\" (1.854 m)   Wt 97.3 kg (214 lb 9.6 oz)   SpO2 97%   BMI 28.31 kg/m²         PHYSICAL EXAMINATION:  GENERAL: Alert and oriented x3, in no acute distress, well-developed, well-nourished, afebrile. HEART: No JVD. EYES: No scleral icterus   NECK: No significant lymphadenopathy   LUNGS: No respiratory compromise or indrawing  ABDOMEN: Soft, non-tender, non-distended. Electronically signed by:  Tate Araujo MD            I, Jaylan Jacinto. Sp Conte M.D., have reviewed the history, physical, and have updated the allergic reactions for Maicol Guerrero. TIME OUT performed immediately prior to the start of procedure:  Kayce Rico M.D., have performed the following reviews on Maicol Guerrero prior to the start of the procedure:    - Patient was identified by name and date of birth  - Agreement on procedure being performed was verified  - Risks and benefits explained to the patient  -Patient was positioned for comfort  - Consent was signed and verified    Time: 8:36 AM     Body Part: right trochanteric bursa    Medication and Dose: 1mL Celestone preparation, i.e. 6 mg. Date of Procedure: 11/19/20    PROCEDURE PERFORMED BY : Tramaine Conte M.D., [facility])    Provider Assisted by: Adelita Montgomery    Patient assisted by: self    Patient tolerated procedure well with no complications

## 2020-11-26 DIAGNOSIS — M54.50 LUMBAR PAIN: ICD-10-CM

## 2020-11-26 DIAGNOSIS — R20.2 NUMBNESS AND TINGLING OF RIGHT LOWER EXTREMITY: ICD-10-CM

## 2020-11-26 DIAGNOSIS — M25.551 RIGHT HIP PAIN: ICD-10-CM

## 2020-11-26 DIAGNOSIS — R20.0 NUMBNESS AND TINGLING OF RIGHT LOWER EXTREMITY: ICD-10-CM

## 2020-12-11 ENCOUNTER — HOSPITAL ENCOUNTER (OUTPATIENT)
Age: 70
Discharge: HOME | End: 2020-12-11
Attending: ORTHOPAEDIC SURGERY
Payer: MEDICARE

## 2020-12-11 PROCEDURE — 72148 MRI LUMBAR SPINE W/O DYE: CPT

## 2020-12-11 PROCEDURE — 73721 MRI JNT OF LWR EXTRE W/O DYE: CPT

## 2021-01-12 ENCOUNTER — TELEPHONE (OUTPATIENT)
Dept: FAMILY MEDICINE CLINIC | Age: 71
End: 2021-01-12

## 2021-01-12 NOTE — TELEPHONE ENCOUNTER
Patient is experiencing light headedness and dizzy since getting up this morning.   Please call him back to discuss and advise what he should do    [phone]

## 2021-01-12 NOTE — TELEPHONE ENCOUNTER
Spoke with patient at this time. Referred patient to urgent care for evaluation at this time. Also follow up scheduled for 01/21/21

## 2021-02-12 ENCOUNTER — OFFICE VISIT (OUTPATIENT)
Dept: ORTHOPEDIC SURGERY | Age: 71
End: 2021-02-12
Payer: MEDICARE

## 2021-02-12 VITALS
BODY MASS INDEX: 28.49 KG/M2 | WEIGHT: 215 LBS | SYSTOLIC BLOOD PRESSURE: 150 MMHG | HEIGHT: 73 IN | RESPIRATION RATE: 16 BRPM | TEMPERATURE: 98.2 F | DIASTOLIC BLOOD PRESSURE: 83 MMHG | HEART RATE: 93 BPM | OXYGEN SATURATION: 100 %

## 2021-02-12 DIAGNOSIS — M70.61 GREATER TROCHANTERIC BURSITIS OF RIGHT HIP: Primary | ICD-10-CM

## 2021-02-12 DIAGNOSIS — M48.062 SPINAL STENOSIS OF LUMBAR REGION WITH NEUROGENIC CLAUDICATION: ICD-10-CM

## 2021-02-12 DIAGNOSIS — M25.551 RIGHT HIP PAIN: ICD-10-CM

## 2021-02-12 DIAGNOSIS — M54.50 LUMBAR PAIN: ICD-10-CM

## 2021-02-12 DIAGNOSIS — R59.9 LYMPH NODES ENLARGED: ICD-10-CM

## 2021-02-12 PROCEDURE — G8753 SYS BP > OR = 140: HCPCS | Performed by: ORTHOPAEDIC SURGERY

## 2021-02-12 PROCEDURE — G8427 DOCREV CUR MEDS BY ELIG CLIN: HCPCS | Performed by: ORTHOPAEDIC SURGERY

## 2021-02-12 PROCEDURE — G8432 DEP SCR NOT DOC, RNG: HCPCS | Performed by: ORTHOPAEDIC SURGERY

## 2021-02-12 PROCEDURE — G8419 CALC BMI OUT NRM PARAM NOF/U: HCPCS | Performed by: ORTHOPAEDIC SURGERY

## 2021-02-12 PROCEDURE — G8754 DIAS BP LESS 90: HCPCS | Performed by: ORTHOPAEDIC SURGERY

## 2021-02-12 PROCEDURE — G8536 NO DOC ELDER MAL SCRN: HCPCS | Performed by: ORTHOPAEDIC SURGERY

## 2021-02-12 PROCEDURE — 3017F COLORECTAL CA SCREEN DOC REV: CPT | Performed by: ORTHOPAEDIC SURGERY

## 2021-02-12 PROCEDURE — 99214 OFFICE O/P EST MOD 30 MIN: CPT | Performed by: ORTHOPAEDIC SURGERY

## 2021-02-12 PROCEDURE — 1101F PT FALLS ASSESS-DOCD LE1/YR: CPT | Performed by: ORTHOPAEDIC SURGERY

## 2021-02-12 RX ORDER — IBUPROFEN 200 MG
400 TABLET ORAL
COMMUNITY
End: 2021-10-26

## 2021-02-12 NOTE — PROGRESS NOTES
Patient: Analilia Rosales                MRN: [id number]       SSN: [id number]  YOB: 1950        AGE: 66 y.o. SEX: male  Body mass index is 28.37 kg/m². PCP: Apoorva Dowd MD  02/12/21    HISTORY:  We saw Mr. Margie Martinez for reevaluation of hip pain, radiculopathy, low back pain. Cortisone injection helped to some degree. It still hurts him a little bit to roll over on it. He has been taking ibuprofen twice a day with the usual precautions. He can go up to three times a day. We did discus complications associated with it. He is still having radicular symptoms going down the legs,sometimes he has to bend over or sit down for it to improve. No bowel or bladder issues. Denies fevers, chills, night sweats or weight loss. Denies any loss of smell or taste. The back pain is moderate. The right hip pain is improved. PHYSICAL EXAMINATION:  SLR is just equivocal. No foot drop. Compartments are soft. No cyanosis, peripheral edema or clubbing. Just mild evidence of neuropathy of L4. The hips rotate nicely. MRI reviewed of the hip, indicates bursitis of the right hip. Lumbar spine MRI indicates significant spinal stenosis as expected with degenerative disc disease. PLAN:  I recommend the [address] [place] [address] for him. They did note some lymphadenopathy and they recommend general surgery consultation as well. Hopefully it is just benign. He otherwise has been feeling well and there is no personal history of cancer for him. I have offered therapy for his hip, he would like to see how things go. We can always do some therapy for the hip at a later date. We will refer him to [address] [place] [address] as well. Follow up six weeks time to check on bursitis of his hip.     C:   Jocelyn Reid MD        REVIEW OF SYSTEMS:      CON: negative  EYE: negative   ENT: negative  RESP: negative  GI:    negative   :  negative  MSK: Positive  A twelve point review of systems was completed, positives noted and all other systems were reviewed and are negative          Past Medical History:   Diagnosis Date    Aortic valve insufficiency     Arthritis     Hypertension     Other ill-defined conditions(486.54)     murmer       Family History   Problem Relation Age of Onset    No Known Problems Mother     No Known Problems Father        Current Outpatient Medications   Medication Sig Dispense Refill    ibuprofen (MOTRIN) 200 mg tablet Take  by mouth.  alfuzosin SR (UROXATRAL) 10 mg SR tablet TAKE 1 TABLET BY MOUTH  DAILY AFTER DINNER 90 Tab 3    benazepriL (LOTENSIN) 40 mg tablet TAKE 1 TABLET BY MOUTH  DAILY 90 Tab 1    amLODIPine (NORVASC) 10 mg tablet TAKE 1 TABLET BY MOUTH  DAILY 90 Tab 1    atorvastatin (LIPITOR) 40 mg tablet TAKE 1 TABLET BY MOUTH  DAILY 90 Tab 1    pregabalin (LYRICA) 150 mg capsule TAKE 1 CAPSULE BY MOUTH  TWICE DAILY MAXIMUM DAILY  AMOUNT:300MG 180 Cap 1    hydroCHLOROthiazide (HYDRODIURIL) 25 mg tablet Take 1 Tab by mouth daily. 90 Tab 1    potassium chloride (KLOR-CON) 10 mEq tablet Take 1 Tab by mouth daily. 90 Tab 1    travoprost (TRAVATAN Z) 0.004 % ophthalmic solution Administer 1 Drop to both eyes nightly.  acetaminophen (TYLENOL EXTRA STRENGTH) 500 mg tablet Take 1,000 mg by mouth every six (6) hours as needed for Pain.          No Known Allergies    Past Surgical History:   Procedure Laterality Date    HX APPENDECTOMY  1961    HX OTHER SURGICAL Left 1990's    Exc. mass foot     HX OTHER SURGICAL Right 1980    release Dequervain's contracture    HX OTHER SURGICAL Left 2014    Removal large nevi behind ear       Social History     Socioeconomic History    Marital status:      Spouse name: Not on file    Number of children: Not on file    Years of education: Not on file    Highest education level: Not on file   Occupational History    Not on file   Social Needs    Financial resource strain: Not on file    Food insecurity     Worry: Not on file Inability: Not on file    Transportation needs     Medical: Not on file     Non-medical: Not on file   Tobacco Use    Smoking status: Former Smoker     Quit date: 1980     Years since quittin.1    Smokeless tobacco: Never Used   Substance and Sexual Activity    Alcohol use: No    Drug use: No     Comment: H/O in the past Marijuana    Sexual activity: Not on file   Lifestyle    Physical activity     Days per week: Not on file     Minutes per session: Not on file    Stress: Not on file   Relationships    Social connections     Talks on phone: Not on file     Gets together: Not on file     Attends Latter-day service: Not on file     Active member of club or organization: Not on file     Attends meetings of clubs or organizations: Not on file     Relationship status: Not on file    Intimate partner violence     Fear of current or ex partner: Not on file     Emotionally abused: Not on file     Physically abused: Not on file     Forced sexual activity: Not on file   Other Topics Concern    Not on file   Social History Narrative    Not on file       Visit Vitals  BP (!) 150/83 (BP 1 Location: Left upper arm, BP Patient Position: Sitting)   Pulse 93   Temp 98.2 °F (36.8 °C) (Temporal)   Resp 16   Ht 6' 1\" (1.854 m)   Wt 97.5 kg (215 lb)   SpO2 100%   BMI 28.37 kg/m²         PHYSICAL EXAMINATION:  GENERAL: Alert and oriented x3, in no acute distress, well-developed, well-nourished, afebrile. HEART: No JVD. EYES: No scleral icterus   NECK: No significant lymphadenopathy   LUNGS: No respiratory compromise or indrawing  ABDOMEN: Soft, non-tender, non-distended. Electronically signed by:  Jose Reyes MD

## 2021-03-01 ENCOUNTER — OFFICE VISIT (OUTPATIENT)
Dept: FAMILY MEDICINE CLINIC | Age: 71
End: 2021-03-01
Payer: MEDICARE

## 2021-03-01 VITALS
OXYGEN SATURATION: 97 % | SYSTOLIC BLOOD PRESSURE: 134 MMHG | WEIGHT: 215 LBS | BODY MASS INDEX: 28.49 KG/M2 | DIASTOLIC BLOOD PRESSURE: 67 MMHG | RESPIRATION RATE: 18 BRPM | HEART RATE: 72 BPM | TEMPERATURE: 98.6 F | HEIGHT: 73 IN

## 2021-03-01 DIAGNOSIS — E78.5 DYSLIPIDEMIA: ICD-10-CM

## 2021-03-01 DIAGNOSIS — B07.8 OTHER VIRAL WARTS: ICD-10-CM

## 2021-03-01 DIAGNOSIS — G62.9 NEUROPATHY: ICD-10-CM

## 2021-03-01 DIAGNOSIS — R39.9 LOWER URINARY TRACT SYMPTOMS (LUTS): ICD-10-CM

## 2021-03-01 DIAGNOSIS — I10 ESSENTIAL HYPERTENSION: Primary | ICD-10-CM

## 2021-03-01 DIAGNOSIS — M54.50 CHRONIC MIDLINE LOW BACK PAIN WITHOUT SCIATICA: ICD-10-CM

## 2021-03-01 DIAGNOSIS — G89.29 CHRONIC MIDLINE LOW BACK PAIN WITHOUT SCIATICA: ICD-10-CM

## 2021-03-01 DIAGNOSIS — M25.551 RIGHT HIP PAIN: ICD-10-CM

## 2021-03-01 PROCEDURE — G8752 SYS BP LESS 140: HCPCS | Performed by: FAMILY MEDICINE

## 2021-03-01 PROCEDURE — G8419 CALC BMI OUT NRM PARAM NOF/U: HCPCS | Performed by: FAMILY MEDICINE

## 2021-03-01 PROCEDURE — G8427 DOCREV CUR MEDS BY ELIG CLIN: HCPCS | Performed by: FAMILY MEDICINE

## 2021-03-01 PROCEDURE — 3017F COLORECTAL CA SCREEN DOC REV: CPT | Performed by: FAMILY MEDICINE

## 2021-03-01 PROCEDURE — G8754 DIAS BP LESS 90: HCPCS | Performed by: FAMILY MEDICINE

## 2021-03-01 PROCEDURE — 1101F PT FALLS ASSESS-DOCD LE1/YR: CPT | Performed by: FAMILY MEDICINE

## 2021-03-01 PROCEDURE — 99214 OFFICE O/P EST MOD 30 MIN: CPT | Performed by: FAMILY MEDICINE

## 2021-03-01 PROCEDURE — G8510 SCR DEP NEG, NO PLAN REQD: HCPCS | Performed by: FAMILY MEDICINE

## 2021-03-01 PROCEDURE — G8536 NO DOC ELDER MAL SCRN: HCPCS | Performed by: FAMILY MEDICINE

## 2021-03-01 NOTE — PROGRESS NOTES
Chief Complaint   Patient presents with    Follow Up Chronic Condition     1. Have you been to the ER, urgent care clinic since your last visit? Hospitalized since your last visit? No    2. Have you seen or consulted any other health care providers outside of the [address] [place] [address] since your last visit? Include any pap smears or colon screening. No     HPI  Monique Alberto comes in for f/u care. Back pain: Patient has chronic low back pain. Diagnosed as having spinal stenosis. He is seen by Dr Mayco Cisneros. He has had an MRI done in the past.  He is currently taking ibuprofen and Tylenol. He will continue with management as recommended by the specialist.  He denies bladder or bowel dysfunction. He does have occasional numbness and tingling lower extremities. Neuropathy: Patient has numbness and tingling lower extremities. This affects the hands also. He is on on lyrica. This has helped with the symptoms. We will continue with current treatment plan. Hip pain: Patient has right hip pain. He is followed up by the orthopedist.  He is on ibuprofen and Tylenol with good result. He also does supportive care measures. HTN: Patient has hypertension. Blood pressure is stable. Denies headache, changes in vision or focal weakness. He is on Lotensin, HCTZ and amlodipine. He takes potassium supplementation. We will continue with the current treatment plan. Urology: Patient has urinary incontinence, urgency and LUTS. He is seen by the urologist, Dr. Vidya Stallings. Symptoms have persisted despite medication. He is on alfuzosin. Currently has been recommended to get a UroLift procedure. Dyslipidemia: Patient has dyslipidemia. He is on atorvastatin. We will recheck lipid panel at next visit. He will exercise and take a diet low in polysaturated fats. Warts: Patient has viral warts on his right hand fingers. Has noticed these over the past few months. He would like to have them taken out.   I will place a referral for him to be seen by the dermatologist.       Past Medical History  Past Medical History:   Diagnosis Date    Aortic valve insufficiency     Arthritis     Hypertension     Other ill-defined conditions(799.89)     murmer       Surgical History  Past Surgical History:   Procedure Laterality Date    HX APPENDECTOMY  1961    HX OTHER SURGICAL Left 1990's    Exc. mass foot     HX OTHER SURGICAL Right 1980    release Dequervain's contracture    HX OTHER SURGICAL Left 2014    Removal large nevi behind ear        Medications  Current Outpatient Medications   Medication Sig Dispense Refill    ibuprofen (MOTRIN) 200 mg tablet Take 400 mg by mouth every six (6) hours as needed.  alfuzosin SR (UROXATRAL) 10 mg SR tablet TAKE 1 TABLET BY MOUTH  DAILY AFTER DINNER 90 Tab 3    benazepriL (LOTENSIN) 40 mg tablet TAKE 1 TABLET BY MOUTH  DAILY 90 Tab 1    amLODIPine (NORVASC) 10 mg tablet TAKE 1 TABLET BY MOUTH  DAILY 90 Tab 1    atorvastatin (LIPITOR) 40 mg tablet TAKE 1 TABLET BY MOUTH  DAILY 90 Tab 1    pregabalin (LYRICA) 150 mg capsule TAKE 1 CAPSULE BY MOUTH  TWICE DAILY MAXIMUM DAILY  AMOUNT:300MG 180 Cap 1    hydroCHLOROthiazide (HYDRODIURIL) 25 mg tablet Take 1 Tab by mouth daily. 90 Tab 1    potassium chloride (KLOR-CON) 10 mEq tablet Take 1 Tab by mouth daily. 90 Tab 1    travoprost (TRAVATAN Z) 0.004 % ophthalmic solution Administer 1 Drop to both eyes nightly.  acetaminophen (TYLENOL EXTRA STRENGTH) 500 mg tablet Take 1,000 mg by mouth every six (6) hours as needed for Pain.          Allergies  No Known Allergies    Family History  Family History   Problem Relation Age of Onset    No Known Problems Mother     No Known Problems Father        Social History  Social History     Socioeconomic History    Marital status:      Spouse name: Not on file    Number of children: Not on file    Years of education: Not on file    Highest education level: Not on file   Occupational History    Not on file   Social Needs    Financial resource strain: Not on file    Food insecurity     Worry: Not on file     Inability: Not on file    Transportation needs     Medical: Not on file     Non-medical: Not on file   Tobacco Use    Smoking status: Former Smoker     Quit date: 1980     Years since quittin.1    Smokeless tobacco: Never Used   Substance and Sexual Activity    Alcohol use: No    Drug use: No     Comment: H/O in the past Marijuana    Sexual activity: Not on file   Lifestyle    Physical activity     Days per week: Not on file     Minutes per session: Not on file    Stress: Not on file   Relationships    Social connections     Talks on phone: Not on file     Gets together: Not on file     Attends Congregational service: Not on file     Active member of club or organization: Not on file     Attends meetings of clubs or organizations: Not on file     Relationship status: Not on file    Intimate partner violence     Fear of current or ex partner: Not on file     Emotionally abused: Not on file     Physically abused: Not on file     Forced sexual activity: Not on file   Other Topics Concern    Not on file   Social History Narrative    Not on file       Review of Systems  Review of Systems - Review of all systems is negative except as noted above in the HPI.     Vital Signs  Visit Vitals  /67 (BP 1 Location: Left upper arm, BP Patient Position: Sitting, BP Cuff Size: Adult)   Pulse 72   Temp 98.6 °F (37 °C) (Oral)   Resp 18   Ht 6' 1\" (1.854 m)   Wt 215 lb (97.5 kg)   SpO2 97%   BMI 28.37 kg/m²         Physical Exam  Physical Examination: General appearance - oriented to person, place, and time, overweight, acyanotic, in no respiratory distress and well hydrated  Mental status - alert, oriented to person, place, and time, affect appropriate to mood  Neck - supple, no significant adenopathy  Lymphatics - no palpable lymphadenopathy  Chest - clear to auscultation, no wheezes, rales or rhonchi, symmetric air entry  Heart - S1 and S2 normal  Abdomen - no rebound tenderness noted  Back exam - limited range of motion, pain with motion noted during exam  Neurological - abnormal neurological exam unchanged from prior examinations  Musculoskeletal - osteoarthritic changes noted in both hands  Extremities - intact peripheral pulses, warts right hand distal phalanx second third and fourth fingers        Results  Results for orders placed or performed in visit on 02/19/21   AMB POC URINALYSIS DIP STICK AUTO W/O MICRO   Result Value Ref Range    Color (UA POC) Yellow     Clarity (UA POC) Clear     Glucose (UA POC) Negative Negative    Bilirubin (UA POC) Negative Negative    Ketones (UA POC) Negative Negative    Specific gravity (UA POC) 1.025 1.001 - 1.035    Blood (UA POC) Negative Negative    pH (UA POC) 5.0 4.6 - 8.0    Protein (UA POC) Negative Negative    Urobilinogen (UA POC) 0.2 mg/dL 0.2 - 1    Nitrites (UA POC) Negative Negative    Leukocyte esterase (UA POC) Negative Negative       ASSESSMENT and PLAN    ICD-10-CM ICD-9-CM    1. Essential hypertension  I10 401.9    2. Dyslipidemia  E78.5 272.4    3. Neuropathy  G62.9 355.9    4. Lower urinary tract symptoms (LUTS)  R39.9 788.99    5. Right hip pain  M25.551 719.45    6. Chronic midline low back pain without sciatica  M54.5 724.2     G89.29 338.29    7. Other viral warts  B07.8 078.19 REFERRAL TO DERMATOLOGY     lab results and schedule of future lab studies reviewed with patient  reviewed diet, exercise and weight control  cardiovascular risk and specific lipid/LDL goals reviewed  reviewed medications and side effects in detail  use of aspirin to prevent MI and TIA's discussed      I have discussed the diagnosis with the patient and the intended plan of care as seen in the above orders. The patient has received an after-visit summary and questions were answered concerning future plans.  I have discussed medication, side effects, and warnings with the patient in detail. The patient verbalized understanding and is in agreement with the plan of care. The patient will contact the office with any additional concerns. Kimmy Starr MD    PLEASE NOTE:   This document has been produced using voice recognition software.  Unrecognized errors in transcription may be present

## 2021-03-06 DIAGNOSIS — I10 ESSENTIAL HYPERTENSION: ICD-10-CM

## 2021-03-08 RX ORDER — POTASSIUM CHLORIDE 750 MG/1
TABLET, EXTENDED RELEASE ORAL
Qty: 90 TAB | Refills: 3 | Status: SHIPPED | OUTPATIENT
Start: 2021-03-08 | End: 2022-04-06

## 2021-03-08 RX ORDER — HYDROCHLOROTHIAZIDE 25 MG/1
TABLET ORAL
Qty: 90 TAB | Refills: 3 | Status: SHIPPED | OUTPATIENT
Start: 2021-03-08 | End: 2021-07-29 | Stop reason: SDUPTHER

## 2021-04-28 ENCOUNTER — OFFICE VISIT (OUTPATIENT)
Dept: CARDIOLOGY CLINIC | Age: 71
End: 2021-04-28
Payer: MEDICARE

## 2021-04-28 VITALS
DIASTOLIC BLOOD PRESSURE: 74 MMHG | HEIGHT: 73 IN | WEIGHT: 205 LBS | BODY MASS INDEX: 27.17 KG/M2 | SYSTOLIC BLOOD PRESSURE: 144 MMHG | HEART RATE: 66 BPM

## 2021-04-28 DIAGNOSIS — I10 ESSENTIAL HYPERTENSION: ICD-10-CM

## 2021-04-28 DIAGNOSIS — E78.5 DYSLIPIDEMIA: ICD-10-CM

## 2021-04-28 DIAGNOSIS — I35.1 NONRHEUMATIC AORTIC VALVE INSUFFICIENCY: Primary | ICD-10-CM

## 2021-04-28 DIAGNOSIS — I25.10 MILD CAD: ICD-10-CM

## 2021-04-28 PROCEDURE — 99214 OFFICE O/P EST MOD 30 MIN: CPT | Performed by: INTERNAL MEDICINE

## 2021-04-28 PROCEDURE — G8427 DOCREV CUR MEDS BY ELIG CLIN: HCPCS | Performed by: INTERNAL MEDICINE

## 2021-04-28 PROCEDURE — G8510 SCR DEP NEG, NO PLAN REQD: HCPCS | Performed by: INTERNAL MEDICINE

## 2021-04-28 PROCEDURE — G8754 DIAS BP LESS 90: HCPCS | Performed by: INTERNAL MEDICINE

## 2021-04-28 PROCEDURE — G8536 NO DOC ELDER MAL SCRN: HCPCS | Performed by: INTERNAL MEDICINE

## 2021-04-28 PROCEDURE — 3017F COLORECTAL CA SCREEN DOC REV: CPT | Performed by: INTERNAL MEDICINE

## 2021-04-28 PROCEDURE — G8753 SYS BP > OR = 140: HCPCS | Performed by: INTERNAL MEDICINE

## 2021-04-28 PROCEDURE — G8419 CALC BMI OUT NRM PARAM NOF/U: HCPCS | Performed by: INTERNAL MEDICINE

## 2021-04-28 PROCEDURE — 1101F PT FALLS ASSESS-DOCD LE1/YR: CPT | Performed by: INTERNAL MEDICINE

## 2021-04-28 NOTE — PROGRESS NOTES
1. Have you been to the ER, urgent care clinic since your last visit? Hospitalized since your last visit?     no    2. Have you seen or consulted any other health care providers outside of the [address] [place] [address] since your last visit? Include any pap smears or colon screening.       Yes Where: Dr. Stacy Liang

## 2021-04-28 NOTE — PROGRESS NOTES
HISTORY OF PRESENT ILLNESS  Prabhjot Anaya is a 66 y.o. male. Hypertension  The history is provided by the patient. This is a chronic problem. The problem occurs every several days. The problem has not changed since onset. Associated symptoms include shortness of breath. Shortness of Breath  The history is provided by the patient. This is a chronic problem. The problem occurs intermittently. The problem has not changed since onset. Pertinent negatives include no ear pain, no neck pain, no wheezing, no rash and no leg swelling. Review of Systems   Constitutional: Negative for chills and weight loss. HENT: Negative for congestion, ear discharge, ear pain, hearing loss, nosebleeds and tinnitus. Eyes: Negative for blurred vision. Respiratory: Positive for shortness of breath. Negative for wheezing and stridor. Cardiovascular: Negative for leg swelling. Gastrointestinal: Negative for heartburn. Musculoskeletal: Negative for myalgias and neck pain. Skin: Negative for itching and rash. Neurological: Negative for tingling, tremors, focal weakness and loss of consciousness. Psychiatric/Behavioral: Negative for depression and suicidal ideas.      Family History   Problem Relation Age of Onset    No Known Problems Mother     No Known Problems Father        Past Medical History:   Diagnosis Date    Aortic valve insufficiency     Arthritis     Hypertension     Other ill-defined conditions(799.89)     murmer       Past Surgical History:   Procedure Laterality Date    HX APPENDECTOMY      HX OTHER SURGICAL Left 1's    Exc. mass foot     HX OTHER SURGICAL Right     release Dequervain's contracture    HX OTHER SURGICAL Left     Removal large nevi behind ear       Social History     Tobacco Use    Smoking status: Former Smoker     Packs/day: 0.00     Years: 41.00     Pack years: 0.00     Quit date: 1980     Years since quittin.3    Smokeless tobacco: Never Used   Substance Use Topics    Alcohol use: No       No Known Allergies    Outpatient Medications Marked as Taking for the 4/28/21 encounter (Office Visit) with Josue Bauer MD   Medication Sig Dispense Refill    potassium chloride (KLOR-CON) 10 mEq tablet TAKE 1 TABLET BY MOUTH  DAILY 90 Tab 3    hydroCHLOROthiazide (HYDRODIURIL) 25 mg tablet TAKE 1 TABLET BY MOUTH  DAILY 90 Tab 3    ibuprofen (MOTRIN) 200 mg tablet Take 400 mg by mouth every six (6) hours as needed.  alfuzosin SR (UROXATRAL) 10 mg SR tablet TAKE 1 TABLET BY MOUTH  DAILY AFTER DINNER 90 Tab 3    benazepriL (LOTENSIN) 40 mg tablet TAKE 1 TABLET BY MOUTH  DAILY 90 Tab 1    amLODIPine (NORVASC) 10 mg tablet TAKE 1 TABLET BY MOUTH  DAILY 90 Tab 1    atorvastatin (LIPITOR) 40 mg tablet TAKE 1 TABLET BY MOUTH  DAILY 90 Tab 1    pregabalin (LYRICA) 150 mg capsule TAKE 1 CAPSULE BY MOUTH  TWICE DAILY MAXIMUM DAILY  AMOUNT:300MG 180 Cap 1    travoprost (TRAVATAN Z) 0.004 % ophthalmic solution Administer 1 Drop to both eyes nightly.  acetaminophen (TYLENOL EXTRA STRENGTH) 500 mg tablet Take 1,000 mg by mouth every six (6) hours as needed for Pain. Visit Vitals  BP (!) 144/74   Pulse 66   Ht 6' 1\" (1.854 m)   Wt 93 kg (205 lb)   BMI 27.05 kg/m²     Physical Exam   Constitutional: He is oriented to person, place, and time. He appears well-developed and well-nourished. No distress. HENT:   Head: Atraumatic. Mouth/Throat: No oropharyngeal exudate. Eyes: Conjunctivae are normal. Right eye exhibits no discharge. Left eye exhibits no discharge. No scleral icterus. Neck: Normal range of motion. Neck supple. No JVD present. No tracheal deviation present. No thyromegaly present. Cardiovascular: Normal rate and regular rhythm. Exam reveals no gallop. Murmur (2/6 systolic murmur best heard at apex/ aortic area with no radiation) heard. Pulmonary/Chest: Effort normal and breath sounds normal. No stridor. He has no wheezes.  He has no rales.   Abdominal: Soft. There is no abdominal tenderness. There is no rebound. Musculoskeletal: Normal range of motion. General: No tenderness or edema. Lymphadenopathy:     He has no cervical adenopathy. Neurological: He is alert and oriented to person, place, and time. He exhibits normal muscle tone. Skin: Skin is warm. He is not diaphoretic. Psychiatric: He has a normal mood and affect. His behavior is normal.     ekg sinus rhythm with t wave inversion in anterior leads. 10/15/18   ECHO ADULT COMPLETE 10/19/2018 10/19/2018    Narrative · Normal left ventricular cavity size and systolic function (ejection   fraction normal). Moderate concentric hypertrophy observed. The estimated   ejection fraction is 61 - 65%. No regional wall motion abnormality noted. There is mild (grade 1) left ventricular diastolic dysfunction. · Normal right ventricular size and function. · The left atrial cavity size is mildly dilated. LA index is 35.03 ml/m2. · The right atiral cavity size is mildly dilated. · Echogenicity visualized on the right coronary cusp of the aortic valve,   cannot rule out healed vegetation vs. calcification. Moderate aortic valve   regurgitation with an eccentrically directed jet. · There is mild leaflet thickening of the mitral valve leaflets with mild   mitral annular calcification and mild regurgitation. · Mild tricuspid valve regurgitation. Pulmonary arterial systolic pressure   is 39 mmHg. · Mild pulmonic regurgitation. · The aortic root is mildly dilated. · Mild aortic valve sclerosis. Echogenicity visualized on the right   coronary cusp, cannot rule out calcification. Moderate aortic valve   regurgitation is present. · Pulmonary arterial systolic pressure is 47.9 mmHg. · Pulmonary arterial systolic pressure is 56.2 mmHg.         Signed by: Padmini Wade MD     Lexiscan : no ischemia  02/14/19   CARDIAC PROCEDURE 02/14/2019 2/14/2019    Narrative Non obstructive epicardial coronary arteries with preserved LV function. Signed by: Bruno Mckeon MD     ASSESSMENT and PLAN    ICD-10-CM ICD-9-CM    1. Nonrheumatic aortic valve insufficiency  I35.1 424.1 ECHO ADULT COMPLETE   2. Dyslipidemia  E78.5 272.4    3. Essential hypertension  I10 401.9    4. Mild CAD  I25.10 414.00      Orders Placed This Encounter    ECHO ADULT COMPLETE     Standing Status:   Future     Standing Expiration Date:   10/29/2021     Order Specific Question:   Contrast Enhancement (Bubble Study, Definity, Optison) may be used if criteria listed in established evidence-based protocol has been identified. Answer:   Yes     Follow-up and Dispositions    · Return in about 6 months (around 10/28/2021). current treatment plan is effective, no change in therapy  use of aspirin to prevent MI and TIA's discussed. Patient with risk factors seen for atypical chest pain.  ekg is abnormal with concerns for ischemia  No ischemia on stress test  Normal LV function with mild to moderate aortic regurgitation. No vegetation noted on the aortic valve. Underwent recent cardiac cath - mild CAD with normal LVEF. Seen for f/u- bp improving on current meds  Has stable dyspnea. Will repeat echo prior to next appt to assess VHD/ AI.  meawhile continue salt restriction and weight reduction.

## 2021-04-29 ENCOUNTER — OFFICE VISIT (OUTPATIENT)
Dept: ORTHOPEDIC SURGERY | Age: 71
End: 2021-04-29
Payer: MEDICARE

## 2021-04-29 VITALS
OXYGEN SATURATION: 97 % | BODY MASS INDEX: 27.17 KG/M2 | HEART RATE: 73 BPM | WEIGHT: 205 LBS | RESPIRATION RATE: 16 BRPM | HEIGHT: 73 IN | TEMPERATURE: 97.2 F

## 2021-04-29 DIAGNOSIS — M54.10 RADICULAR LOW BACK PAIN: ICD-10-CM

## 2021-04-29 DIAGNOSIS — M70.61 GREATER TROCHANTERIC BURSITIS OF RIGHT HIP: Primary | ICD-10-CM

## 2021-04-29 DIAGNOSIS — M25.551 RIGHT HIP PAIN: ICD-10-CM

## 2021-04-29 DIAGNOSIS — M54.16 LUMBAR RADICULOPATHY: ICD-10-CM

## 2021-04-29 PROCEDURE — G8432 DEP SCR NOT DOC, RNG: HCPCS | Performed by: ORTHOPAEDIC SURGERY

## 2021-04-29 PROCEDURE — G8419 CALC BMI OUT NRM PARAM NOF/U: HCPCS | Performed by: ORTHOPAEDIC SURGERY

## 2021-04-29 PROCEDURE — G8756 NO BP MEASURE DOC: HCPCS | Performed by: ORTHOPAEDIC SURGERY

## 2021-04-29 PROCEDURE — 3017F COLORECTAL CA SCREEN DOC REV: CPT | Performed by: ORTHOPAEDIC SURGERY

## 2021-04-29 PROCEDURE — 1101F PT FALLS ASSESS-DOCD LE1/YR: CPT | Performed by: ORTHOPAEDIC SURGERY

## 2021-04-29 PROCEDURE — 99214 OFFICE O/P EST MOD 30 MIN: CPT | Performed by: ORTHOPAEDIC SURGERY

## 2021-04-29 PROCEDURE — G8427 DOCREV CUR MEDS BY ELIG CLIN: HCPCS | Performed by: ORTHOPAEDIC SURGERY

## 2021-04-29 PROCEDURE — G8536 NO DOC ELDER MAL SCRN: HCPCS | Performed by: ORTHOPAEDIC SURGERY

## 2021-04-29 PROCEDURE — 20610 DRAIN/INJ JOINT/BURSA W/O US: CPT | Performed by: ORTHOPAEDIC SURGERY

## 2021-04-29 RX ORDER — BETAMETHASONE SODIUM PHOSPHATE AND BETAMETHASONE ACETATE 3; 3 MG/ML; MG/ML
6 INJECTION, SUSPENSION INTRA-ARTICULAR; INTRALESIONAL; INTRAMUSCULAR; SOFT TISSUE ONCE
Status: COMPLETED | OUTPATIENT
Start: 2021-04-29 | End: 2021-04-29

## 2021-04-29 RX ADMIN — BETAMETHASONE SODIUM PHOSPHATE AND BETAMETHASONE ACETATE 6 MG: 3; 3 INJECTION, SUSPENSION INTRA-ARTICULAR; INTRALESIONAL; INTRAMUSCULAR; SOFT TISSUE at 09:34

## 2021-04-29 NOTE — PROGRESS NOTES
Patient: Christophe Jimenez                MRN: [id number]       SSN: [id number]  YOB: 1950        AGE: 66 y.o. SEX: male  Body mass index is 27.05 kg/m². PCP: Chelle Joseph MD  04/29/21    Rickie Schirmer returns in follow-up for reevaluation of right hip pain also low back pain with radiculopathy can go down both legs has had a previous MRI of the lumbar spine and also of the hip revealed significant spinal stenosis and foraminal stenosis in the MRI of the hip is mainly bursitis with just mild arthritis only. No recent trauma no fevers chills night sweats or weight loss and he is interested in going to the spine center    Not much in the way of groin pain    Hurts to roll over on at night and the previous injection was helpful we also discussed the role of physical therapy. The examination he appears younger than his stated age both hips actually rotate fairly well he has some tenderness over the trochanter which is mild to moderate in the lower lumbar spine is mildly tender form as well straight leg raise is negative there is no foot drop and tib ant EHL 70 5 out of 5 mild evidence of neuropathy distally.     Previous x-rays and MRI confirmed is mild arthritis of the    Under aseptic this is the right hip was injected bursa and well-tolerated as per protocol there was a discussion regarding surgery was decided that is not currently recommended we should pursue nonoperative measures we will also do some therapy for his hip and spine center for his back    REVIEW OF SYSTEMS:      CON: negative  EYE: negative   ENT: negative  RESP: negative  GI:    negative   :  negative  MSK: Positive  A twelve point review of systems was completed, positives noted and all other systems were reviewed and are negative          Past Medical History:   Diagnosis Date    Aortic valve insufficiency     Arthritis     Hypertension     Other ill-defined conditions(919.37)     murmer       Family History Problem Relation Age of Onset    No Known Problems Mother     No Known Problems Father        Current Outpatient Medications   Medication Sig Dispense Refill    potassium chloride (KLOR-CON) 10 mEq tablet TAKE 1 TABLET BY MOUTH  DAILY 90 Tab 3    hydroCHLOROthiazide (HYDRODIURIL) 25 mg tablet TAKE 1 TABLET BY MOUTH  DAILY 90 Tab 3    ibuprofen (MOTRIN) 200 mg tablet Take 400 mg by mouth every six (6) hours as needed.  alfuzosin SR (UROXATRAL) 10 mg SR tablet TAKE 1 TABLET BY MOUTH  DAILY AFTER DINNER 90 Tab 3    benazepriL (LOTENSIN) 40 mg tablet TAKE 1 TABLET BY MOUTH  DAILY 90 Tab 1    amLODIPine (NORVASC) 10 mg tablet TAKE 1 TABLET BY MOUTH  DAILY 90 Tab 1    atorvastatin (LIPITOR) 40 mg tablet TAKE 1 TABLET BY MOUTH  DAILY 90 Tab 1    pregabalin (LYRICA) 150 mg capsule TAKE 1 CAPSULE BY MOUTH  TWICE DAILY MAXIMUM DAILY  AMOUNT:300MG 180 Cap 1    travoprost (TRAVATAN Z) 0.004 % ophthalmic solution Administer 1 Drop to both eyes nightly.  acetaminophen (TYLENOL EXTRA STRENGTH) 500 mg tablet Take 1,000 mg by mouth every six (6) hours as needed for Pain.        Current Facility-Administered Medications   Medication Dose Route Frequency Provider Last Rate Last Admin    betamethasone (CELESTONE) injection 6 mg  6 mg IntraBURSal ONCE Raul Herndon MD           No Known Allergies    Past Surgical History:   Procedure Laterality Date    HX APPENDECTOMY  1961    HX OTHER SURGICAL Left 1990's    Exc. mass foot     HX OTHER SURGICAL Right 1980    release Dequervain's contracture    HX OTHER SURGICAL Left 2014    Removal large nevi behind ear       Social History     Socioeconomic History    Marital status:      Spouse name: Not on file    Number of children: Not on file    Years of education: Not on file    Highest education level: Not on file   Occupational History    Not on file   Social Needs    Financial resource strain: Not on file    Food insecurity     Worry: Not on file Inability: Not on file    Transportation needs     Medical: Not on file     Non-medical: Not on file   Tobacco Use    Smoking status: Former Smoker     Packs/day: 0.00     Years: 41.00     Pack years: 0.00     Quit date: 1980     Years since quittin.3    Smokeless tobacco: Never Used   Substance and Sexual Activity    Alcohol use: No    Drug use: No     Comment: H/O in the past Marijuana    Sexual activity: Not on file   Lifestyle    Physical activity     Days per week: Not on file     Minutes per session: Not on file    Stress: Not on file   Relationships    Social connections     Talks on phone: Not on file     Gets together: Not on file     Attends Oriental orthodox service: Not on file     Active member of club or organization: Not on file     Attends meetings of clubs or organizations: Not on file     Relationship status: Not on file    Intimate partner violence     Fear of current or ex partner: Not on file     Emotionally abused: Not on file     Physically abused: Not on file     Forced sexual activity: Not on file   Other Topics Concern    Not on file   Social History Narrative    Not on file       Visit Vitals  Pulse 73   Temp 97.2 °F (36.2 °C)   Resp 16   Ht 6' 1\" (1.854 m)   Wt 93 kg (205 lb)   SpO2 97%   BMI 27.05 kg/m²         PHYSICAL EXAMINATION:  GENERAL: Alert and oriented x3, in no acute distress, well-developed, well-nourished, afebrile. HEART: No JVD. EYES: No scleral icterus   NECK: No significant lymphadenopathy   LUNGS: No respiratory compromise or indrawing  ABDOMEN: Soft, non-tender, non-distended. Electronically signed by: MD SAM Kerr Santina Irish Gino Lei, M.D., have reviewed the history, physical, and have updated the allergic reactions for Odessa Alexander.     TIME OUT performed immediately prior to the start of procedure:  Alicia VARGAS M.D., have performed the following reviews on Odessa Alexander prior to the start of the procedure:    - Patient was identified by name and date of birth  - Agreement on procedure being performed was verified  - Risks and benefits explained to the patient  - Patient was positioned for comfort  - Consent was signed and verified  - Patient was advised regarding risks of bruising, bleeding, infection and pain    Time: 9:28 AM     Body Part: intra-bursal injection of right hip    Medication and Dose: 1mL Celestone preparation, i.e. 6 mg, and 3 mL 1% lidocaine    Date of Procedure: 04/29/21    PROCEDURE PERFORMED BY : Lazarus Samuel L. Jaunita Linger, M.D., [facility])    Provider Assisted by: Oswaldo Boyd    Patient assisted by: self    Patient tolerated procedure well with no complications

## 2021-05-01 DIAGNOSIS — I10 ESSENTIAL HYPERTENSION: ICD-10-CM

## 2021-05-01 DIAGNOSIS — E78.5 DYSLIPIDEMIA: ICD-10-CM

## 2021-05-03 RX ORDER — AMLODIPINE BESYLATE 10 MG/1
TABLET ORAL
Qty: 90 TAB | Refills: 3 | Status: SHIPPED | OUTPATIENT
Start: 2021-05-03

## 2021-05-03 RX ORDER — ATORVASTATIN CALCIUM 40 MG/1
TABLET, FILM COATED ORAL
Qty: 90 TAB | Refills: 3 | Status: SHIPPED | OUTPATIENT
Start: 2021-05-03

## 2021-05-03 RX ORDER — BENAZEPRIL HYDROCHLORIDE 40 MG/1
TABLET ORAL
Qty: 90 TAB | Refills: 3 | Status: SHIPPED | OUTPATIENT
Start: 2021-05-03

## 2021-05-17 ENCOUNTER — OFFICE VISIT (OUTPATIENT)
Dept: ORTHOPEDIC SURGERY | Age: 71
End: 2021-05-17
Payer: MEDICARE

## 2021-05-17 VITALS
BODY MASS INDEX: 27.77 KG/M2 | OXYGEN SATURATION: 97 % | HEIGHT: 72 IN | WEIGHT: 205 LBS | TEMPERATURE: 98 F | HEART RATE: 74 BPM

## 2021-05-17 DIAGNOSIS — M25.551 RIGHT HIP PAIN: ICD-10-CM

## 2021-05-17 DIAGNOSIS — M48.062 SPINAL STENOSIS OF LUMBAR REGION WITH NEUROGENIC CLAUDICATION: Primary | ICD-10-CM

## 2021-05-17 PROCEDURE — G8427 DOCREV CUR MEDS BY ELIG CLIN: HCPCS | Performed by: PHYSICAL MEDICINE & REHABILITATION

## 2021-05-17 PROCEDURE — G8419 CALC BMI OUT NRM PARAM NOF/U: HCPCS | Performed by: PHYSICAL MEDICINE & REHABILITATION

## 2021-05-17 PROCEDURE — G8536 NO DOC ELDER MAL SCRN: HCPCS | Performed by: PHYSICAL MEDICINE & REHABILITATION

## 2021-05-17 PROCEDURE — 99203 OFFICE O/P NEW LOW 30 MIN: CPT | Performed by: PHYSICAL MEDICINE & REHABILITATION

## 2021-05-17 PROCEDURE — 3017F COLORECTAL CA SCREEN DOC REV: CPT | Performed by: PHYSICAL MEDICINE & REHABILITATION

## 2021-05-17 PROCEDURE — G8432 DEP SCR NOT DOC, RNG: HCPCS | Performed by: PHYSICAL MEDICINE & REHABILITATION

## 2021-05-17 PROCEDURE — G8756 NO BP MEASURE DOC: HCPCS | Performed by: PHYSICAL MEDICINE & REHABILITATION

## 2021-05-17 PROCEDURE — 1101F PT FALLS ASSESS-DOCD LE1/YR: CPT | Performed by: PHYSICAL MEDICINE & REHABILITATION

## 2021-05-17 NOTE — PROGRESS NOTES
[address]  [address], [address]  [place], [address] [place] [address] Street  Phone: [phone]  Fax: [phone]      Patient: Prabhjot Anaya                                                                              MRN: [id number]        YOB: 1950          AGE: 66 y.o. PCP: Rowena Caldwell MD  Date:  05/17/21    Reason for Consultation: Back Pain      HPI:  Prabhjot Anaya is a 66 y.o. male with relevant PMH of aortic valve insufficiency HTN,  who presents with intermittent low back pain radiating down bilateral legs R>L. Low back pain began over 25 years ago after lifting something heavy- was diagnosed with a bulging disc at the time. Over the years pain has been manageable until recently. He has been seeing Telly Marin for right hip pain with mild hip OA and acute greater trochanteric bursitis. Dr. Rose Marie Marin got an MRI of his lumbar spine which demonstrates severe spinal stenosis and referred him here for further evaluation          Neurologic symptoms: +  numbness, tingling, +  weakness, NO bowel or bladder changes. No recent falls      Location: The pain is located in the low back  Radiation: The pain Blank single select template: does radiate down b/l legs laterally into the calf. Pain Score: Currently: 3/10   Quality: Pain is of a Achy, Burning, Tingling and Numbness quality. Aggravating: Pain is exacerbated by walking, standing and bending  Alleviating: The pain is alleviated by sitting    Prior Treatments:   Right GT bursa injection most recent 2 weeks ago  Previous Medications: gabapentin  Current Medications: ibuprofen, tylenol, lyrica 150mg  Previous work-up has included:   MRI right hip 11/20/2020  Acute right trochanteric bursitis  No osseous defect identified in the right femur  Nonspecific but enlargement inguinal lymph nodes identified    MRI lumbar spine 11/20/2020  L1-2: Patent canal and foramina.   L2-3: Central canal stenosis 0.65 cm. Ligamentous and bony hypertrophy identified .  L3-4: Severe central canal stenosis 0.56 cm AP dimension. Ligamentous and bony facet hypertrophy identified. . Right neuroforamina left neuroforamina unremarkable .     L4-5: Central canal stenosis present thecal sac AP dimension just at 1 cm  Right neuroforamina demonstrates no significant stenosis. The left neuroforamina  demonstrates small herniation caudal aspect of the exiting nerve root. Nerve root is not compressed.     L5-S1: Moderate signal loss mild displacement is present. There is no central canal stenosis There is a left neuroforaminal moderate-sized herniation projecting into the foramina which touches but does not compress the exiting left L5 nerve root.   The symptomatic right side L5 nerve root is not compressed.   The defect on the left side does project into the thecal sac and may be causing intermittent left S1 nerve root irritation. Past Medical History:   Past Medical History:   Diagnosis Date    Aortic valve insufficiency     Arthritis     Hypertension     Other ill-defined conditions(799.89)     murmer      Past Surgical History:   Past Surgical History:   Procedure Laterality Date    HX APPENDECTOMY      HX OTHER SURGICAL Left     Exc. mass foot     HX OTHER SURGICAL Right     release Dequervain's contracture    HX OTHER SURGICAL Left     Removal large nevi behind ear      SocHx:   Social History     Tobacco Use    Smoking status: Former Smoker     Packs/day: 0.00     Years: 41.00     Pack years: 0.00     Quit date: 1980     Years since quittin.4    Smokeless tobacco: Never Used   Substance Use Topics    Alcohol use: No      FamHx:? Family History   Problem Relation Age of Onset    No Known Problems Mother     No Known Problems Father        Current Medications:    Current Outpatient Medications   Medication Sig Dispense Refill    aspirin delayed-release 81 mg tablet Take 81 mg by mouth daily.  cholecalciferol (VITAMIN D3) (1000 Units /25 mcg) tablet Take 1,000 Units by mouth daily.  therapeutic multivitamin (THERAGRAN) tablet Take 1 Tab by mouth daily.  atorvastatin (LIPITOR) 40 mg tablet TAKE 1 TABLET BY MOUTH  DAILY 90 Tab 3    benazepriL (LOTENSIN) 40 mg tablet TAKE 1 TABLET BY MOUTH  DAILY 90 Tab 3    amLODIPine (NORVASC) 10 mg tablet TAKE 1 TABLET BY MOUTH  DAILY 90 Tab 3    potassium chloride (KLOR-CON) 10 mEq tablet TAKE 1 TABLET BY MOUTH  DAILY 90 Tab 3    hydroCHLOROthiazide (HYDRODIURIL) 25 mg tablet TAKE 1 TABLET BY MOUTH  DAILY 90 Tab 3    ibuprofen (MOTRIN) 200 mg tablet Take 400 mg by mouth every six (6) hours as needed.  alfuzosin SR (UROXATRAL) 10 mg SR tablet TAKE 1 TABLET BY MOUTH  DAILY AFTER DINNER 90 Tab 3    pregabalin (LYRICA) 150 mg capsule TAKE 1 CAPSULE BY MOUTH  TWICE DAILY MAXIMUM DAILY  AMOUNT:300MG 180 Cap 1    travoprost (TRAVATAN Z) 0.004 % ophthalmic solution Administer 1 Drop to both eyes nightly.  acetaminophen (TYLENOL EXTRA STRENGTH) 500 mg tablet Take 1,000 mg by mouth every six (6) hours as needed for Pain.  zinc gluconate 50 mg tablet Take 50 mg by mouth daily. Allergies:  No Known Allergies     Review of Systems:   Gen:    Denied fevers, chills, malaise, fatigue, weight changes   Resp: Denied shortness of breath, cough, wheezing   CVS: Denied chest pain, palpitations   : Denied urinary urgency, frequency, incontinence   GI: Denied nausea, vomiting, constipation, diarrhea   Skin: Denied rashes, wounds   Psych: Denied anxiety, depression   Vasc: Denied claudication, ulcers   Hem: Denied easy bruising/bleeding   MSK: See HPI   Neuro: See HPI         Physical Exam     Vital Signs:   Visit Vitals  Ht 6' (1.829 m)   Wt 205 lb (93 kg)   BMI 27.80 kg/m²      General: ??????? Well nourished and well developed male without any acute distress   Psychiatric: ?  Alert and oriented x 3 with normal mood    HEENT: ????????   Atraumatic Respiratory:   Breathing non-labored and non dyspneic   CV: ???????????????? Peripheral pulses intact, no peripheral edema   Skin: ????????????? No rashes       Neurologic: ?? Sensation: normal and grossly intact thebilateral, lower extremity(s)  Strength: 5/5 in the bilateral, lower extremity(s)   Reflexes: reveals 2+ symmetric DTRs throughout LE  Gait: normal       Musculoskeletal: Lumbar Exam     Inspection:   Alignment: Abnormal straightening lumbar lordosis  Atrophy: None         Tenderness to Palpation:   Lumbar paraspinals Positive  Lumbar spinous processes Negative  SI Joint:  Negative  Gluteal:Negative   Greater trochanter: Positive right and positive right gluteal    ROM:   Lumbar ROM: Abnormal mild pain with extension  Lumbar facet loading: Negative  Hip ROM: No reproduction of pain with movement     Special Tests      Slump test: Negative  SLR: Negative   Tight hamstrings b/l  URIEL: Negative  FADIR: Negative  Log Roll: Negative          Medical Decision Making:    Images:   MRI right hip 11/20/2020  Acute right trochanteric bursitis  No osseous defect identified in the right femur  Nonspecific but enlargement inguinal lymph nodes identified    MRI lumbar spine 11/20/2020- indepednetly reviewed images with patient. Severe spinal stenosis at L3/4, L4/5. Left foraminal narrowing L5/S1       Assessment:   1. Lumbar spondylosis  2. Severe spinal stenosis       Plan:      -Physical therapy -  Referral to start Physical therapy  -Medications - continue current medications, he is considering switching back to gabapentin which seemed to help more than lyrica is helping.  Counseled regarding side effects and appropriate administration of medications.    -Diagnostics/Imaging - Reviewed MRI lumbar spine and hip with patient  -Injections - consider lumbar XIANG vs facet MBB  -Lifestyle - Encouraged regular exercise continued weight loss, he has lost 10lbs through diet recenlty  -Education - The patient's diagnosis, prognosis and treatment options were discussed today. All questions were answered  -Coordination of Care- Printed copy of hip MRI to take to PCP next weeks appt review inguinal lymph node enlargement  F/U - in 8 week(s) or sooner if needed.   Consider [facility]

## 2021-06-03 ENCOUNTER — HOSPITAL ENCOUNTER (OUTPATIENT)
Dept: PHYSICAL THERAPY | Age: 71
Discharge: HOME | End: 2021-06-03
Attending: PHYSICAL MEDICINE & REHABILITATION
Payer: MEDICARE

## 2021-06-03 PROCEDURE — 97110 THERAPEUTIC EXERCISES: CPT | Performed by: PHYSICAL THERAPIST

## 2021-06-03 PROCEDURE — 97162 PT EVAL MOD COMPLEX 30 MIN: CPT | Performed by: PHYSICAL THERAPIST

## 2021-06-03 NOTE — PROGRESS NOTES
PT DAILY TREATMENT NOTE/LUMBAR EVAL     Patient Name: Odessa Alexander  Date:6/3/2021  : 1950  [x]  Patient  Verified  [facility] / Plan: [address] / Product Type: Managed Care Medicare /    In time:2:45  Out time:2:29  Total Treatment Time (min): 44  Visit #: 1 of 8    Medicare/BCBS Only   Total Timed Codes (min):  24 1:1 Treatment Time:  44     Treatment Area: Right hip pain [M25.551]  Low back pain [M54.5]  SUBJECTIVE  Pain Level (0-10 scale): 3/10 now; 1/10 at best; 8/10 at worst.  Right hip pain 5-6/10 now; 3/10 at best; 10/10 at worst.  []constant [x]intermittent []improving [x]worsening []no change since onset    Any medication changes, allergies to medications, adverse drug reactions, diagnosis change, or new procedure performed?: [x] No    [] Yes (see summary sheet for update)  Subjective functional status/changes:     PLOF: Works part time; Orthodoxy activities; not very active. Limitations to PLOF: None at this time. Mechanism of Injury: Patient with a long h/o back problems. Notes it has progressively gotten worse. Worse the past 6 months, more than it ever was. Current symptoms/Complaints: Lower back pain which is intermittent. Pain is bilateral in the back. He has numbness down both legs. Also notes tingling. Sometimes feels like he will fall. Previous Treatment/Compliance: None. He has had injections in his hip for bursitis. PMHx/Surgical Hx: No h/o back surgeries. 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 Living Situation: Lives in an apartment, has to go up stairs but uses an elevator most of the time. Pt Goals: \"I would like to this problem to decrease or go completely. \"  Barriers: [x]pain []financial []time []transportation []other  Cognition: A & O x 3    Other:    OBJECTIVE/EXAMINATION  Domestic Life: Lives with his wife. Activity/Recreational Limitations: None, notes he is not that active.   Mobility: ambulates without any AD.  Self Care: Independent. 20 min [x]Eval                  []Re-Eval       24 min Therapeutic Exercise:  [] See flow sheet :   Rationale: increase ROM and increase strength to improve the patients ability to increase his functional activity level. With   [] TE   [] TA   [] neuro   [] other: Patient Education: [x] Review HEP    [] Progressed/Changed HEP based on:   [] positioning   [] body mechanics   [] transfers   [] heat/ice application    [] other:      Other Objective/Functional Measures:     Physical Therapy Evaluation - Lumbar Spine ([facility])    SUBJECTIVE  Symptoms:  Aggravated by:   [x] Bending [] Sitting [x] Standing [x] Walking   [] Moving [] Cough [] Sneeze [] Valsalva   [] AM  [] PM  Lying:  [] sup   [] pro   [] sidelying   [] Other:     Eased by:    [] Bending [x] Sitting [] Standing [] Walking   [] Moving [] AM  [] PM  Lying: [] sup  [] pro  [x] sidelying   [] Other:     Diagnostic Tests: [] Lab work [] X-rays    [] CT [] MRI     [] Other:  Results: Per Dr. Meghan Leon PN of 5/17/2021:  MRI lumbar spine 11/20/2020  L1-2: Patent canal and foramina. L2-3: Central canal stenosis 0.65 cm. Ligamentous and bony hypertrophy identified .  L3-4: Severe central canal stenosis 0.56 cm AP dimension. Ligamentous and bony facet hypertrophy identified. . Right neuroforamina left neuroforamina unremarkable .     L4-5: Central canal stenosis present thecal sac AP dimension just at 1 cm  Right neuroforamina demonstrates no significant stenosis. The left neuroforamina  demonstrates small herniation caudal aspect of the exiting nerve root. Nerve root is not compressed.     L5-S1: Moderate signal loss mild displacement is present.  There is no central canal stenosis There is a left neuroforaminal moderate-sized herniation projecting into the foramina which touches but does not compress the exiting left L5 nerve root.   The symptomatic right side L5 nerve root is not compressed.   The defect on the left side does project into the thecal sac and may be causing intermittent left S1 nerve root irritation. MRI right hip 11/20/2020  Acute right trochanteric bursitis  No osseous defect identified in the right femur  Nonspecific but enlargement inguinal lymph nodes identified    OBJECTIVE  Posture:  Lateral Shift: [] R    [x] L     [x] +  [] -  Kyphosis: [] Increased [] Decreased   [x]  WNL  Lordosis:  [] Increased [] Decreased   [x] WNL  Pelvic symmetry: [x] WNL    [] Other:    Gait:  [] Normal     [] Abnormal:    Active Movements: [] N/A   [] Too acute   [] Other:  ROM % AROM  limitation  Comments:pain, area   Forward flexion 40-60 WNL     Extension 20-30 25%     SB right 20-30 WNL     SB left 20-30 WNL     Rotation right 5-10 WNL     Rotation left 5-10 WNL         Strength   L(0-5) R (0-5) N/T   Hip Flexion (L1,2) 5 5 []   Knee Extension (L3,4) 5 5 []   Ankle Dorsiflexion (L4) 5 5 []   Great Toe Extension (L5)   [x]   Ankle Plantarflexion (S1) 5 5 []   Knee Flexion (S1,2) 4+ 4+ []   Hip Extension (S1,2) 4 4 []   Hip Abduction (L5) 4+ 4- []      []      []      []      []     Special Tests  Lumbar:  Iliolumbar Ligament Test: [] Pos  [x] Neg               Leg Length: [] +    [x] -   Position: supine    Crests: level in standing    Mobility: Standing flex: (-)    Hip: Perry:  [x] R    [x] L    [x] +    [] - tightness and groin pain    Scour:  [x] R    [] L    [] +    [x] -     Piriformis: [x] R    [x] L    [x] +    [] -   Dural Mobility:    SLR Sitting: [x] R    [x] L    [] +    [x] -  @ (degrees):             Supine: [x] R    [x] L    [] +    [x] -  @ (degrees):     Slump Test: [x] R    [x] L    [] +    [x] -  @ (degrees):     Prone Knee Bend: [x] R    [x] L    [] +    [x] -     Palpation  [x] Min  [x] Mod  [] Severe    Location: right greater trochanter  [] Min  [x] Mod  [] Severe    Location: midline over the spinous processes L5 into the upper T/S.        Other tests/comments: Able to perform heel gait and toe gait.     Pain Level (0-10 scale) post treatment: Hip 6; Back 0    ASSESSMENT/Changes in Function: Patient with signs and symptoms consistent with acute exacerbation of a chronic lower back problem. Patient notes insidious onset of increased back pain with bilateral LE numbness and tingling. He has good AROM of the L/S except for limited extension. He has good LE strength. There is midline tenderness over the spinous processes from L5 to the upper T/S. Functionally, he is doing his normal activities despite the pain but acknowledges that he is not very active normally.       Patient will continue to benefit from skilled PT services to modify and progress therapeutic interventions, address functional mobility deficits, analyze and address soft tissue restrictions, analyze and cue movement patterns, analyze and modify body mechanics/ergonomics and assess and modify postural abnormalities to attain remaining goals. [x]  See Plan of Care  []  See progress note/recertification  []  See Discharge Summary         Progress towards goals / Updated goals:  Short Term Goals: To be accomplished in 1 weeks:  1. Patient will become proficient in their HEP and will be compliant in performing that program.  Evaluation:   Patient given a written/illustrated HEP.     Long Term Goals: To be accomplished in 4 weeks:  1. Patient's pain level will be 1/10-4/10 with activity in order to improve patient's ability to perform normal ADLs. Evaluation:  1/10-8/10  2. Patient will demonstrate AROM Lumbar Spine extension with less than 25% limitation and minimal pain to increase ease of ADLs. Evaluation:  AROM L/S extension limited 25% with pain. 3. Patient will increase FOTO score to 66 to indicate increased functional mobility. Evaluation:  58  4. Patient will tolerate walking 1/2 mile with little to no difficulty. Evaluation:  Walking a mile is limited a lot.     PLAN  [x]  Upgrade activities as tolerated     [x]  Continue plan of care  []  Update interventions per flow sheet       []  Discharge due to:_  []  Other:_      Turner Zhang, PT 6/3/2021  12:42 PM

## 2021-06-03 NOTE — PROGRESS NOTES
[facility]              [address] [facility]        [place], [address] [place]   [phone] fax    Plan of Care/ Statement of Necessity for Physical Therapy Services    Patient name: Evans Acevedo Start of Care: 6/3/2021   Referral source: Jb Monsalve* : 1950    Medical Diagnosis: Right hip pain [M25.551]  Low back pain [M54.5]  Payor: [address] [place] [address] / Plan: [address] / Product Type: Managed Care Medicare /  Onset Date:    Treatment Diagnosis: Right hip pain; lower back pain   Prior Hospitalization: see medical history Provider#: [id number]   Medications: Verified on Patient summary List    Comorbidities: Arthritis, Back Pain, HBP, Visual Impairment. Prior Level of Function: Works part time; Christianity activities; not very active. Independent in his self care. The Plan of Care and following information is based on the information from the initial evaluation. Assessment/ key information: Patient with signs and symptoms consistent with acute exacerbation of a chronic lower back problem. Patient notes insidious onset of increased back pain with bilateral LE numbness and tingling. He has good AROM of the L/S except for limited extension. He has good LE strength. There is midline tenderness over the spinous processes from L5 to the upper T/S. Functionally, he is doing his normal activities despite the pain but acknowledges that he is not very active normally. Patient will benefit from a program of skilled physical therapy to include therapeutic exercises to address strength deficits, therapeutic activities to improve functional mobility, neuromuscular reeducation to address balance, coordination and proprioception, manual therapy to address ROM and tissue extensibility and modalities as indicated. All questions were answered.     Evaluation Complexity History MEDIUM  Complexity : 1-2 comorbidities / personal factors will impact the outcome/ POC ; Examination MEDIUM Complexity : 3 Standardized tests and measures addressing body structure, function, activity limitation and / or participation in recreation  ;Presentation MEDIUM Complexity : Evolving with changing characteristics  ; Clinical Decision Making MEDIUM Complexity : FOTO score of 26-74  Overall Complexity Rating: MEDIUM  Problem List: pain affecting function, decrease activity tolerance and decrease flexibility/ joint mobility   Treatment Plan may include any combination of the following: Therapeutic exercise, Therapeutic activities, Neuromuscular re-education, Physical agent/modality and Manual therapy  Patient / Family readiness to learn indicated by: asking questions, trying to perform skills and interest  Persons(s) to be included in education: patient (P)  Barriers to Learning/Limitations: None  Patient Goal (s): I would like to this problem to decrease or go completely  Patient Self Reported Health Status: fair  Rehabilitation Potential: good    Short Term Goals: To be accomplished in 1 weeks:  1. Patient will become proficient in their HEP and will be compliant in performing that program.  Evaluation:   Patient given a written/illustrated HEP. Long Term Goals: To be accomplished in 4 weeks:  1. Patient's pain level will be 1/10-4/10 with activity in order to improve patient's ability to perform normal ADLs. Evaluation:  1/10-8/10  2. Patient will demonstrate AROM Lumbar Spine extension with less than 25% limitation and minimal pain to increase ease of ADLs. Evaluation:  AROM L/S extension limited 25% with pain. 3. Patient will increase FOTO score to 66 to indicate increased functional mobility. Evaluation:  58  4. Patient will tolerate walking 1/2 mile with little to no difficulty. Evaluation:  Walking a mile is limited a lot. Frequency / Duration: Patient to be seen 2 times per week for 4 weeks.     Patient/ Caregiver education and instruction: Diagnosis, prognosis, exercises   [x]  Plan of care has been reviewed with PTA      Certification Period: 6/3/2021 - 7/2/2021  Tarah Graham, PT 6/3/2021 12:22 PM  ________________________________________________________________________    I certify that the above Therapy Services are being furnished while the patient is under my care. I agree with the treatment plan and certify that this therapy is necessary.     Physician's Signature:____________Date:_________TIME:________     Lori Francis*  ** Signature, Date and Time must be completed for valid certification **  Please sign and return to [facility] - [address], [address] [place]   [phone] fax

## 2021-06-28 ENCOUNTER — OFFICE VISIT (OUTPATIENT)
Dept: SURGERY | Age: 71
End: 2021-06-28
Payer: MEDICARE

## 2021-06-28 VITALS
OXYGEN SATURATION: 99 % | HEIGHT: 72 IN | BODY MASS INDEX: 27.2 KG/M2 | HEART RATE: 90 BPM | WEIGHT: 200.8 LBS | SYSTOLIC BLOOD PRESSURE: 153 MMHG | DIASTOLIC BLOOD PRESSURE: 80 MMHG | TEMPERATURE: 97.4 F

## 2021-06-28 DIAGNOSIS — R59.1 LYMPHADENOPATHY: Primary | ICD-10-CM

## 2021-06-28 PROCEDURE — 1101F PT FALLS ASSESS-DOCD LE1/YR: CPT | Performed by: SURGERY

## 2021-06-28 PROCEDURE — G8754 DIAS BP LESS 90: HCPCS | Performed by: SURGERY

## 2021-06-28 PROCEDURE — G8427 DOCREV CUR MEDS BY ELIG CLIN: HCPCS | Performed by: SURGERY

## 2021-06-28 PROCEDURE — G8432 DEP SCR NOT DOC, RNG: HCPCS | Performed by: SURGERY

## 2021-06-28 PROCEDURE — 99204 OFFICE O/P NEW MOD 45 MIN: CPT | Performed by: SURGERY

## 2021-06-28 PROCEDURE — G8536 NO DOC ELDER MAL SCRN: HCPCS | Performed by: SURGERY

## 2021-06-28 PROCEDURE — G8753 SYS BP > OR = 140: HCPCS | Performed by: SURGERY

## 2021-06-28 PROCEDURE — 3017F COLORECTAL CA SCREEN DOC REV: CPT | Performed by: SURGERY

## 2021-06-28 PROCEDURE — G8419 CALC BMI OUT NRM PARAM NOF/U: HCPCS | Performed by: SURGERY

## 2021-06-28 NOTE — PROGRESS NOTES
Katlyn Padilla is a 66 y.o. male (: 1950) presenting to address:    Chief Complaint   Patient presents with    New Patient     enlarged inguinal lymph nodes       Medication list and allergies have been reviewed with Katlyn Padilla and updated as of today's date. I have gone over all Medical, Surgical and Social History with Katlyn Padilla and updated/added the information accordingly.

## 2021-06-28 NOTE — PROGRESS NOTES
General Surgery Consult    Jeronimo Weldon  Admit date: (Not on file)    MRN: [id number]     : 1950     Age: 66 y.o. Attending Physician: Markos Scott MD, [facility]      History of Present Illness:      Jeronimo Weldon is a 66 y.o. male who was referred to me by Dr. Thom Hanna for evaluation of bilateral iliac and groin lymphadenopathy. The patient had a CT scan in December of last year and it showed nonspecific lymphadenopathy. The patient has stated that his been having some problem with urinary urgency and he had a UroLift surgery earlier this year by the urology team.  He stated he also have BPH. He denies any groin pain or discomfort and he denies any fever or chills or night sweats. He stated that his main concern is that he has an insurance that is not good and does not cover very well and does not cover the surgery at all and even it does not cover outpatient surgical procedures according to him.      Patient Active Problem List    Diagnosis Date Noted    Peyronie's syndrome 2020    Neuropathy 2020    Abnormal EKG 10/10/2018    Chest pain 10/10/2018    Dyslipidemia 2018    Benign prostatic hyperplasia with incomplete bladder emptying 2018    Lower urinary tract symptoms (LUTS) 2018    Essential hypertension 2018    Polyp of colon 2018    History of colon polyps 2014    Family history of colon cancer 2014     Past Medical History:   Diagnosis Date    Aortic valve insufficiency     Arthritis     Hypertension     Other ill-defined conditions(799.89)     murmer      Past Surgical History:   Procedure Laterality Date    HX APPENDECTOMY      HX OTHER SURGICAL Left     Exc. mass foot     HX OTHER SURGICAL Right     release Dequervain's contracture    HX OTHER SURGICAL Left     Removal large nevi behind ear      Social History     Tobacco Use    Smoking status: Former Smoker     Packs/day: 0.00     Years: 41.00 Pack years: 0.00     Quit date: 1980     Years since quittin.5    Smokeless tobacco: Never Used   Substance Use Topics    Alcohol use: No      Social History     Tobacco Use   Smoking Status Former Smoker    Packs/day: 0.00    Years: 41.00    Pack years: 0.00    Quit date: 1980    Years since quittin.5   Smokeless Tobacco Never Used     Family History   Problem Relation Age of Onset    No Known Problems Mother     No Known Problems Father       Current Outpatient Medications   Medication Sig    aspirin delayed-release 81 mg tablet Take 81 mg by mouth daily.  cholecalciferol (VITAMIN D3) (1000 Units /25 mcg) tablet Take 1,000 Units by mouth daily.  therapeutic multivitamin (THERAGRAN) tablet Take 1 Tab by mouth daily.  zinc gluconate 50 mg tablet Take 50 mg by mouth daily.  atorvastatin (LIPITOR) 40 mg tablet TAKE 1 TABLET BY MOUTH  DAILY    benazepriL (LOTENSIN) 40 mg tablet TAKE 1 TABLET BY MOUTH  DAILY    amLODIPine (NORVASC) 10 mg tablet TAKE 1 TABLET BY MOUTH  DAILY    potassium chloride (KLOR-CON) 10 mEq tablet TAKE 1 TABLET BY MOUTH  DAILY    hydroCHLOROthiazide (HYDRODIURIL) 25 mg tablet TAKE 1 TABLET BY MOUTH  DAILY    ibuprofen (MOTRIN) 200 mg tablet Take 400 mg by mouth every six (6) hours as needed.  alfuzosin SR (UROXATRAL) 10 mg SR tablet TAKE 1 TABLET BY MOUTH  DAILY AFTER DINNER    pregabalin (LYRICA) 150 mg capsule TAKE 1 CAPSULE BY MOUTH  TWICE DAILY MAXIMUM DAILY  AMOUNT:300MG    travoprost (TRAVATAN Z) 0.004 % ophthalmic solution Administer 1 Drop to both eyes nightly.  acetaminophen (TYLENOL EXTRA STRENGTH) 500 mg tablet Take 1,000 mg by mouth every six (6) hours as needed for Pain. No current facility-administered medications for this visit. No Known Allergies       Review of Systems:  Pertinent items are noted in the History of Present Illness.     Objective:     Visit Vitals  BP (!) 153/80 (BP 1 Location: Left lower arm, BP Patient Position: Sitting)   Pulse 90   Temp 97.4 °F (36.3 °C)   Ht 6' (1.829 m)   Wt 91.1 kg (200 lb 12.8 oz)   SpO2 99%   BMI 27.23 kg/m²       Physical Exam:      General:  in no apparent distress, alert and oriented times 3   Eyes:  conjunctivae and sclerae normal, pupils equal, round, reactive to light   Throat & Neck: no erythema or exudates noted and neck supple and symmetrical; no palpable masses   Lungs:   clear to auscultation bilaterally   Heart:  Regular rate and rhythm   Abdomen:   rounded, soft, nontender, nondistended, no masses or organomegaly. Bilateral groin exam showed no clear pathology on the left side but on the right groin area there is a lymph node that is around 1 to 1.5 cm that is slightly harder than usual but easily movable. Extremities: extremities normal, atraumatic, no cyanosis or edema   Skin: Normal.       Imaging and Lab Review:     CBC:   Lab Results   Component Value Date/Time    WBC 4.7 03/11/2020 10:25 AM    RBC 5.68 (H) 03/11/2020 10:25 AM    HGB 15.6 03/11/2020 10:25 AM    HCT 48.6 (H) 03/11/2020 10:25 AM    PLATELET 725 54/63/5612 10:25 AM     BMP:   Lab Results   Component Value Date/Time    Glucose 98 03/11/2020 10:25 AM    Sodium 141 03/11/2020 10:25 AM    Potassium 4.5 03/11/2020 10:25 AM    Chloride 107 03/11/2020 10:25 AM    CO2 29 03/11/2020 10:25 AM    BUN 17 03/11/2020 10:25 AM    Creatinine 1.02 03/11/2020 10:25 AM    Calcium 9.6 03/11/2020 10:25 AM     CMP:  Lab Results   Component Value Date/Time    Glucose 98 03/11/2020 10:25 AM    Sodium 141 03/11/2020 10:25 AM    Potassium 4.5 03/11/2020 10:25 AM    Chloride 107 03/11/2020 10:25 AM    CO2 29 03/11/2020 10:25 AM    BUN 17 03/11/2020 10:25 AM    Creatinine 1.02 03/11/2020 10:25 AM    Calcium 9.6 03/11/2020 10:25 AM    Anion gap 5 03/11/2020 10:25 AM    BUN/Creatinine ratio 17 03/11/2020 10:25 AM    Alk.  phosphatase 76 03/11/2020 10:25 AM    Protein, total 8.1 03/11/2020 10:25 AM    Albumin 4.4 03/11/2020 10:25 AM Globulin 3.7 03/11/2020 10:25 AM    A-G Ratio 1.2 03/11/2020 10:25 AM       No results found for this or any previous visit (from the past 24 hour(s)). images and reports reviewed    Assessment:   Jeronimo Weldon is a 66 y.o. male who has multiple medical conditions and is presenting with bilateral lymphadenopathy in the iliac and femoral area for at least 7 months now. On examination I could only feel a superficial lymph nodes in the right groin area that slightly harder than usual and I explained to the patient that the best option is to excise this lymph nodes. At the beginning the patient was clearly against doing any surgery and he said no and I explained to him that that the reason why we need to excise it is to rule out malignancy. He stated that he is not interested much into doing that and he stated that his main concern is that his insurance would not cover for the procedure. He stated that his insurance did not cover surgery so I asked him if they do cover radiological procedures because I can place an order for ultrasound-guided biopsy though its not as sensitive as an excisional biopsy but it will do the job I think giving the situation. The patient stated that he is not sure. At that point the patient was still reluctant to do any intervention so I explained to him that it may be Margarita Bland will call his insurance and see if they can cover for the procedure and maybe he can proceed with that whether with the excisional biopsy that I can do or a ultrasound-guided biopsy which we can ask radiology to do. The patient finally agreed on this plan. I also advised the patient to follow-up with the urology. Plan: Will ask Margarita Bland to please call his insurance and see how much will be the co-pay for excisional biopsy of right groin lymph node. If it is too high or did not cover and the patient is not willing to proceed then we can check if it can be done under ultrasound-guided biopsy.    Follow-up with his urology team   follow-up with his primary care physician    Please call me if you have any questions (cell phone: [phone])     Signed By: Liliana Ansari MD     June 28, 2021

## 2021-06-29 ENCOUNTER — HOSPITAL ENCOUNTER (OUTPATIENT)
Dept: LAB | Age: 71
Discharge: HOME | End: 2021-06-29
Payer: MEDICARE

## 2021-06-29 ENCOUNTER — OFFICE VISIT (OUTPATIENT)
Dept: FAMILY MEDICINE CLINIC | Age: 71
End: 2021-06-29
Payer: MEDICARE

## 2021-06-29 ENCOUNTER — TELEPHONE (OUTPATIENT)
Dept: SURGERY | Age: 71
End: 2021-06-29

## 2021-06-29 VITALS
HEIGHT: 73 IN | RESPIRATION RATE: 16 BRPM | DIASTOLIC BLOOD PRESSURE: 63 MMHG | WEIGHT: 201.4 LBS | TEMPERATURE: 96.7 F | HEART RATE: 66 BPM | OXYGEN SATURATION: 98 % | SYSTOLIC BLOOD PRESSURE: 128 MMHG | BODY MASS INDEX: 26.69 KG/M2

## 2021-06-29 DIAGNOSIS — I10 ESSENTIAL HYPERTENSION: Primary | ICD-10-CM

## 2021-06-29 DIAGNOSIS — N40.1 BENIGN PROSTATIC HYPERPLASIA WITH INCOMPLETE BLADDER EMPTYING: ICD-10-CM

## 2021-06-29 DIAGNOSIS — R39.14 BENIGN PROSTATIC HYPERPLASIA WITH INCOMPLETE BLADDER EMPTYING: ICD-10-CM

## 2021-06-29 DIAGNOSIS — M25.559 HIP PAIN: ICD-10-CM

## 2021-06-29 DIAGNOSIS — Z02.83 ENCOUNTER FOR DRUG SCREENING: ICD-10-CM

## 2021-06-29 DIAGNOSIS — Z12.5 SCREENING FOR MALIGNANT NEOPLASM OF PROSTATE: ICD-10-CM

## 2021-06-29 DIAGNOSIS — G62.9 NEUROPATHY: ICD-10-CM

## 2021-06-29 DIAGNOSIS — E78.5 DYSLIPIDEMIA: ICD-10-CM

## 2021-06-29 DIAGNOSIS — R59.1 LYMPHADENOPATHY: ICD-10-CM

## 2021-06-29 DIAGNOSIS — R39.9 LOWER URINARY TRACT SYMPTOMS (LUTS): ICD-10-CM

## 2021-06-29 PROCEDURE — G8754 DIAS BP LESS 90: HCPCS | Performed by: FAMILY MEDICINE

## 2021-06-29 PROCEDURE — 99214 OFFICE O/P EST MOD 30 MIN: CPT | Performed by: FAMILY MEDICINE

## 2021-06-29 PROCEDURE — G8510 SCR DEP NEG, NO PLAN REQD: HCPCS | Performed by: FAMILY MEDICINE

## 2021-06-29 PROCEDURE — G8419 CALC BMI OUT NRM PARAM NOF/U: HCPCS | Performed by: FAMILY MEDICINE

## 2021-06-29 PROCEDURE — G8752 SYS BP LESS 140: HCPCS | Performed by: FAMILY MEDICINE

## 2021-06-29 PROCEDURE — 3017F COLORECTAL CA SCREEN DOC REV: CPT | Performed by: FAMILY MEDICINE

## 2021-06-29 PROCEDURE — 1101F PT FALLS ASSESS-DOCD LE1/YR: CPT | Performed by: FAMILY MEDICINE

## 2021-06-29 PROCEDURE — G8536 NO DOC ELDER MAL SCRN: HCPCS | Performed by: FAMILY MEDICINE

## 2021-06-29 PROCEDURE — G8427 DOCREV CUR MEDS BY ELIG CLIN: HCPCS | Performed by: FAMILY MEDICINE

## 2021-06-29 PROCEDURE — 80307 DRUG TEST PRSMV CHEM ANLYZR: CPT

## 2021-06-29 RX ORDER — GABAPENTIN 400 MG/1
400 CAPSULE ORAL 3 TIMES DAILY
Qty: 90 CAPSULE | Refills: 2 | Status: SHIPPED | OUTPATIENT
Start: 2021-06-29 | End: 2021-07-30 | Stop reason: SDUPTHER

## 2021-06-29 RX ORDER — GABAPENTIN 400 MG/1
400 CAPSULE ORAL 3 TIMES DAILY
Qty: 90 CAPSULE | Refills: 2 | Status: SHIPPED | OUTPATIENT
Start: 2021-06-29 | End: 2021-06-29 | Stop reason: SDUPTHER

## 2021-06-29 NOTE — PROGRESS NOTES
Chief Complaint   Patient presents with    Hypertension    Medication Evaluation     request change lyrica to gabapentin      1. Have you been to the ER, urgent care clinic since your last visit? Hospitalized since your last visit? No    2. Have you seen or consulted any other health care providers outside of the [address] [place] [address] since your last visit? Include any pap smears or colon screening. No     HPI  Liza Guerrero comes in for f/u care. Neuropathy: Patient has a history of low back pain and lumbar neuropathy. We had started him on Lyrica and has been taking this for some months. States that he has previously been on gabapentin and that did better for him. Has numbness and tingling lower extremities. Would like to get back on the gabapentin. He signed a controlled substance agreement. We will do a urine drug screen. I will send in a prescription of gabapentin to the pharmacy. I will follow-up at next visit. HTN: Patient has hypertension. Blood pressure is stable. He is on amlodipine, benazepril and HCTZ. Denies headache, changes in vision or focal weakness. We will continue current treatment plan. Needs to have labs drawn. I will place a request for the same. Dyslipidemia: Patient has dyslipidemia. He is on lipitor. Stable on the medication and tolerating. We will recheck lipid panel. Urology: Patient has a history of BPH and LUTS. He has been followed up with a urologist.  He is on alfuzosin. He recently had a UroLift procedure following which he had urinary retention. Seen by the specialist and had a Barlow catheter placed that was later removed. Currently he is stable. He is able to pass urine. Back pain: Patient has a history of chronic low back pain. Has been seen by the spine specialist.  He is on ibuprofen.   He has history of spinal stenosis and was seen by the spine specialist.  At the time due to neuropathic symptoms was advised to follow-up for change of medication to the gabapentin. We will do this today. He also will continue with supportive care measures. Hip pain: Patient has right hip pain with trochanteric bursitis. Has been followed up by the orthopedist.  Lymphadenopathy: Patient has inguinal lymphadenopathy. Has been seen by the general surgeon. Recommendation was to do an excisional biopsy but patient seems to have been hesitant due to cost.  He would consider ultrasound-guided biopsy but would like to know the cost of this. He will follow-up with the general surgeon on this. Past Medical History  Past Medical History:   Diagnosis Date    Aortic valve insufficiency     Arthritis     Hypertension     Other ill-defined conditions(459.89)     murmer       Surgical History  Past Surgical History:   Procedure Laterality Date    HX APPENDECTOMY  1961    HX OTHER SURGICAL Left 1990's    Exc. mass foot     HX OTHER SURGICAL Right 1980    release Dequervain's contracture    HX OTHER SURGICAL Left 2014    Removal large nevi behind ear        Medications  Current Outpatient Medications   Medication Sig Dispense Refill    aspirin delayed-release 81 mg tablet Take 81 mg by mouth daily.  cholecalciferol (VITAMIN D3) (1000 Units /25 mcg) tablet Take 1,000 Units by mouth daily.  therapeutic multivitamin (THERAGRAN) tablet Take 1 Tab by mouth daily.  zinc gluconate 50 mg tablet Take 50 mg by mouth daily.  atorvastatin (LIPITOR) 40 mg tablet TAKE 1 TABLET BY MOUTH  DAILY 90 Tab 3    benazepriL (LOTENSIN) 40 mg tablet TAKE 1 TABLET BY MOUTH  DAILY 90 Tab 3    amLODIPine (NORVASC) 10 mg tablet TAKE 1 TABLET BY MOUTH  DAILY 90 Tab 3    potassium chloride (KLOR-CON) 10 mEq tablet TAKE 1 TABLET BY MOUTH  DAILY 90 Tab 3    hydroCHLOROthiazide (HYDRODIURIL) 25 mg tablet TAKE 1 TABLET BY MOUTH  DAILY 90 Tab 3    ibuprofen (MOTRIN) 200 mg tablet Take 400 mg by mouth every six (6) hours as needed.       alfuzosin SR (UROXATRAL) 10 mg SR tablet TAKE 1 TABLET BY MOUTH  DAILY AFTER DINNER 90 Tab 3    pregabalin (LYRICA) 150 mg capsule TAKE 1 CAPSULE BY MOUTH  TWICE DAILY MAXIMUM DAILY  AMOUNT:300MG 180 Cap 1    travoprost (TRAVATAN Z) 0.004 % ophthalmic solution Administer 1 Drop to both eyes nightly.  acetaminophen (TYLENOL EXTRA STRENGTH) 500 mg tablet Take 1,000 mg by mouth every six (6) hours as needed for Pain. Allergies  No Known Allergies    Family History  Family History   Problem Relation Age of Onset    No Known Problems Mother     No Known Problems Father        Social History  Social History     Socioeconomic History    Marital status:      Spouse name: Not on file    Number of children: Not on file    Years of education: Not on file    Highest education level: Not on file   Occupational History    Not on file   Tobacco Use    Smoking status: Former Smoker     Packs/day: 0.00     Years: 41.00     Pack years: 0.00     Quit date: 1980     Years since quittin.5    Smokeless tobacco: Never Used   Vaping Use    Vaping Use: Never used   Substance and Sexual Activity    Alcohol use: No    Drug use: No     Comment: H/O in the past Marijuana    Sexual activity: Not on file   Other Topics Concern    Not on file   Social History Narrative    Not on file     Social Determinants of Health     Financial Resource Strain:     Difficulty of Paying Living Expenses:    Food Insecurity:     Worried About [facility] in the Last Year:     [facility] in the Last Year:    Transportation Needs:     Lack of Transportation (Medical):      Lack of Transportation (Non-Medical):    Physical Activity:     Days of Exercise per Week:     Minutes of Exercise per Session:    Stress:     Feeling of Stress :    Social Connections:     Frequency of Communication with Friends and Family:     Frequency of Social Gatherings with Friends and Family:     Attends Holiness Services:     Active Member of Clubs or Organizations:     Attends Club or Organization Meetings:     Marital Status:    Intimate Partner Violence:     Fear of Current or Ex-Partner:     Emotionally Abused:     Physically Abused:     Sexually Abused:        Review of Systems  Review of Systems - Review of all systems is negative except as noted above in the HPI.     Vital Signs  Visit Vitals  /63 (BP 1 Location: Left upper arm, BP Patient Position: Sitting, BP Cuff Size: Adult)   Pulse 66   Temp (!) 96.7 °F (35.9 °C) (Oral)   Resp 16   Ht 6' 1\" (1.854 m)   Wt 201 lb 6.4 oz (91.4 kg)   SpO2 98%   BMI 26.57 kg/m²         Physical Exam  Physical Examination: General appearance - oriented to person, place, and time and acyanotic, in no respiratory distress  Mental status - affect appropriate to mood  Neck - supple, no significant adenopathy  Lymphatics - no palpable lymphadenopathy, no hepatosplenomegaly  Chest - clear to auscultation, no wheezes, rales or rhonchi, symmetric air entry  Heart - normal rate and regular rhythm, S1 and S2 normal  Abdomen - no rebound tenderness noted  Back exam - limited range of motion  Neurological - abnormal neurological exam unchanged from prior examinations  Musculoskeletal - osteoarthritic changes noted in both hands  Extremities - intact peripheral pulses        Results  Results for orders placed or performed in visit on 05/04/21   AMB POC PVR, SINDI,POST-VOID RES,US,NON-IMAGING   Result Value Ref Range    PVR 31 cc   AMB POC UROFLOWMETRY   Result Value Ref Range    VOIDING TIME 39.3 Seconds    FLOW TIME 28.3 Seconds    TIME TO MAX 6.6 Seconds    MAX FLOW RATE 10.0 ml/Seconds    AVG FLOW RATE 4.0 ml/Seconds    VOIDED VOLUME 112 ml   AMB POC URINALYSIS DIP STICK AUTO W/O MICRO   Result Value Ref Range    Color (UA POC) Yellow     Clarity (UA POC) Clear     Glucose (UA POC) Negative Negative    Bilirubin (UA POC) Negative Negative    Ketones (UA POC) Negative Negative    Specific gravity (UA POC) 1.020 1.001 - 1.035    Blood (UA POC) Negative Negative    pH (UA POC) 5.0 4.6 - 8.0    Protein (UA POC) Negative Negative    Urobilinogen (UA POC) 0.2 mg/dL 0.2 - 1    Nitrites (UA POC) Negative Negative    Leukocyte esterase (UA POC) Negative Negative       ASSESSMENT and PLAN    ICD-10-CM ICD-9-CM    1. Essential hypertension  K22 303.5 METABOLIC PANEL, COMPREHENSIVE      CBC WITH AUTOMATED DIFF   2. Neuropathy  G62.9 355.9 DRUG SCREENING PREGABALIN      gabapentin (NEURONTIN) 400 mg capsule      DRUG SCREEN, URINE      DRUG SCREEN, URINE      DISCONTINUED: gabapentin (NEURONTIN) 400 mg capsule   3. Lower urinary tract symptoms (LUTS)  R39.9 788.99    4. Dyslipidemia  E78.5 272.4 LIPID PANEL   5. Benign prostatic hyperplasia with incomplete bladder emptying  N40.1 600.01     R39.14 788.21    6. Screening for malignant neoplasm of prostate  Z12.5 V76.44 PSA SCREENING (SCREENING)   7. Encounter for drug screening  Z02.83 V72.85 DRUG SCREEN, URINE      DRUG SCREEN, URINE   8. Lymphadenopathy  R59.1 785.6    9. Hip pain  M25.559 719.45      lab results and schedule of future lab studies reviewed with patient  reviewed diet, exercise and weight control  cardiovascular risk and specific lipid/LDL goals reviewed  reviewed medications and side effects in detail      I have discussed the diagnosis with the patient and the intended plan of care as seen in the above orders. The patient has received an after-visit summary and questions were answered concerning future plans. I have discussed medication, side effects, and warnings with the patient in detail. The patient verbalized understanding and is in agreement with the plan of care. The patient will contact the office with any additional concerns. Arther Ormond, MD    PLEASE NOTE:   This document has been produced using voice recognition software.  Unrecognized errors in transcription may be present

## 2021-06-29 NOTE — TELEPHONE ENCOUNTER
Called Mr. Gonzalez/left message with wife as requested to provide information (cpt code to contact his insurance for cost share inquiry) for recommended surgery (lymph node biopsy). I also provided by direct phone number to call me with any questions/when he ready to proceed with scheduling surgery.

## 2021-06-29 NOTE — LETTER
Name:Quinton Mccullough  :1950   MR #:[id number]   Office: [facility]   Page 1 of 5        CONTROLLED SUBSTANCE AGREEMENT     I may be prescribed medications that are controlled substances as part  of my treatment plan for management of my medical condition(s). The goal of my treatment plan is to maintain and/or improve my health and wellbeing. Because controlled substances have an increased risk of abuse or harm, continual re-evaluation is needed determine if the goals of my treatment plan are being met for my safety and the safety of others. Danial Stearns  am entering into this Controlled Substance Agreement with my provider, __________________________________ at [address] [place] [address] . I understand that successful treatment requires mutual trust and honesty between me and my provider. I understand that there are state and federal laws and regulations which apply to the medications that my provider may prescribe that must be followed. I understand there are risks and benefits ts of taking the medicines that my provider may prescribe. I understand and agree that following this Agreement is necessary in continuing my provider-patient relationship and success of my treatment plan. As a part of my treatment plan, I agree to the following:    COMMUNICATION:    1. I will communicate fully with my provider about my medical condition(s), including the effect on my daily life and how well my medications are helping. I will tell my provider all of the medications that I take for any reason, including medications I receive from another health care provider, and will notify my provider about all issues, problems or concerns, including any side effects, which may be related to my medications. I understand that this information allows my provider to adjust my treatment plan to help manage my medical condition.  I understand that this information will become part of my permanent medical record. 2. I will notify my provider if I have a history of alcohol/drug misuse/addiction or if I have had treatment for alcohol/drug addiction in the past, or if I have a new problem with or concern about alcohol/drug use/addiction, because this increases the likelihood of high risk behaviors and may lead to serious medical conditions. 3. Females Only: I will notify my provider if I am or become pregnant, or if I intend to become pregnant, or if I intend to breastfeed. I understand that communication of these issues with my provider is important, due to possible effects my medication could have on an unborn fetus or breastfeeding child. Initials_____      Name:Quinton Dotson   :1950   MR #:[id number]   Office: [facility]   Page 2 of 5       MISUSE OF MEDICATIONS / DRUGS:    1. I agree to take all controlled substances as prescribed, and will not misuse or abuse any controlled substances prescribed by my provider. For my safety, I will not increase the amount of medicine I take without first talking with and getting permission from my provider. 2. If I have a medical emergency, another health care provider may prescribe me medication. If I seek emergency treatment, I will notify my provider within seventy-two (72) hours. 3. I understand that my provider may discuss my use and/or possible misuse/abuse of controlled substances and alcohol, as appropriate, with any health care provider involved in my care, pharmacist or legal authority. ILLEGAL DRUGS:    1. I will not use illegal drugs of any kind, including but not limited to marijuana, heroin, cocaine, or any prescription drug which is not prescribed to me. DRUG DIVERSION / PRESCRIPTION FRAUD:    1. I will not share, sell, trade, give away, or otherwise misuse my prescriptions or medications. 2. I will not alter any prescriptions provided to me by my provider.     SINGLE PROVIDER:    1. I agree that all controlled substances that I take will be prescribed only by my provider (or his/her covering provider) under this Agreement. This agreement does not prevent me from seeking emergency medical treatment or receiving pain management related to a surgery. PROTECTING MEDICATIONS:    1. I am responsible for keeping my prescriptions and medications in a safe and secure place including safeguarding them from loss or theft. I understand that lost, stolen or damaged/destroyed prescriptions or medications will not be replaced. Initials____          Name:.Miller Mckeon   :1950   MR #:[id number]   Office: [facility]   Page 3 of 5   PRESCRIPTION RENEWALS/REFILLS:    1. I will follow my controlled substance medication schedule as prescribed by my provider. 2. I understand and agree that I will make any requests for renewals or refills of my prescriptions only at the time of an office visit or during my providers regular office hours subject to the prescription refill requirements of the individual practice. 3. I understand that my provider may not call in prescriptions for controlled substances to my pharmacy. 4. I understand that my provider may adjust or discontinue these medications as deemed appropriate for my medical treatment plan. This Agreement does not guarantee the prescription of controlled medications. 5. I agree that if my medications are adjusted or discontinued, I will properly dispose of any remaining medications. I understand that I will be required to dispose of any remaining controlled medications prior to being provided with any prescriptions for other controlled medications. 6. I understand that the renewal of my prescription depends on my medical condition, my consistent participation, and my adherence with my treatment plan and this Agreement.     7. I understand that if I do not keep an appointment with my provider, I may not receive a renewal or refill for my controlled substance medication. PRESCRIPTION MONITORING / DRUG TESTIN. I understand that my provider may require me to provide urine, saliva or blood for testing at any time. I understand that this testing will be used to monitor for safety and adherence with my treatment plan and this Agreement. 2. I understand that my provider may ask me to provide an observed urine specimen, which means that a nurse or other health care provider may watch me provide urine, and I agree to cooperate if I am asked to provide an observed specimen. 3. I understand that if I do not provide urine, saliva or blood samples within two (2) hours of my providers request, or other timeframe decided by my provider, my treatment plan could be changed, or my prescriptions and medications may be changed or ended. 4. I understand that urine, saliva and blood test results will be a part of my permanent medical record. Initials_____        Name:Quinton Rabago   XNF:   MR #:[id number]   JYOTIZ:[facility]   Page 4 of 5    5. I understand that my provider is required to obtain a copy of my State Prescription Monitoring Program () Report at any time in order to safely prescribe medications. 6. I will bring all of my prescribed controlled substance medications in their original bottles to all of my scheduled appointments. 7. I understand that my provider may ask me to come to the practice with all of my prescribed medications for a random pill count at any time. I agree to cooperate if I am asked to come in for a random pill count. I understand that if I do not arrive in the timeframe decided by my provider, my treatment plan could be changed, or my prescriptions and medications may be changed or ended.     COOPERATION WITH INVESTIGATIONS:    1. I authorize my provider and my pharmacy to cooperate fully with any local, state, or federal law enforcement agency in the investigation of any possible misuse, sale, or other diversion of my controlled substance prescriptions or medications. RISKS:    1. I understand that my level of consciousness may be affected from the use of controlled substances, and I understand that there are risks, benefits, effects and potential alternatives (including no treatment) to the medications that my provider has prescribed. 2. I understand that I may become drowsy, tired, dizzy, constipated, and sick to my stomach, or have changes in my mood or in my sleep while taking my medications. I have talked with my provider about these possible side effects, risks, benefits, and alternative treatments, and my provider has answered all of my questions. 3. I understand that I should not suddenly stop taking my medications without first speaking with my provider. I understand that if I suddenly stop taking my medications, I may experience nausea, vomiting, sweating,anxiety, sleeplessness, itching or other uncomfortable feelings. 4. I will not take my medications with alcohol of any kind, including beer, wine or liquor. I understand that drinking alcohol with my medications increases the chances of side effects, including breathing problems or even death. 5. I understand that if I have a history of alcoholism or other drug addiction I may be at increased risk of addiction to my medications. Signs of addiction might include craving, compulsive use, and continued use despite harm. Since addiction is a disease, I understand my provider may decide to change my medications and refer me to appropriate treatment services. I understand that this information would become part of my permanent medical record. Initials_____        Name:AdrianaMiller Lizaeli Soares   GUEVARA:[id number]    #:[id number]   MAX:JANAK CLEMENS [facility]   Page 5 of 5       6.  Females only: Children born to women who regularly take controlled substances are likely to have physical problems and suffer withdrawal symptoms at birth. If I am of child-bearing age, I understand that I should use safe and effective birth control while taking any controlled substances to avoid the impact of medications on an unborn fetus or  child. I agree to notify my provider immediately if I should become pregnant so that my treatment plan can be adjusted. 7. Males only: I understand that chronic use of controlled substances has been associated with low testosterone levels in males which may affect my mood, stamina, sexual desire, and general health. I understand that my provider may order the appropriate laboratory test to determine my testosterone level,and I agree to this testing. ADHERENCE:    1. I understand that if I do not adhere to this Agreement in any way, my provider may change my prescriptions, stop prescribing controlled substances or end our provider-patient relationship. 2. If my provider decides to stop prescribing medication, or decides to end our provider-patient relationship,my provider may require that I taper my medications slowly. If necessary, my provider may also provide a prescription for other medications to treat my withdrawal symptoms. UNDERSTANDING THIS AGREEMENT:    I understand that my provider may adjust or stop my prescriptions for controlled substances based on my medical condition and my treatment plan. I understand that this Agreement does not guarantee that I will be prescribed medications or controlled substances. I understand that controlled substances may be just one part  of my treatment plan. My initial on each page and my signature below shows that I have read each page of this Agreement, I have had an opportunity to ask questions, and all of my questions have been answered to my satisfaction by my provider.     By signing below, I agree to comply with this Agreement, and I understand that if I do not follow the Agreements listed above, my provider may stop    _________________________________________  Date/Time 6/29/2021 3:37 PM                 (Patient Signature)    ________________________________________    Date/Time 6/29/2021 3:37 PM   (Parent or Guardian Signature if <18 yrs)    _________________________________________ Date/Time 6/29/2021 3:37 PM   (Provider Signature)

## 2021-06-30 ENCOUNTER — HOSPITAL ENCOUNTER (OUTPATIENT)
Dept: PHYSICAL THERAPY | Age: 71
Discharge: HOME | End: 2021-06-30
Attending: PHYSICAL MEDICINE & REHABILITATION
Payer: MEDICARE

## 2021-06-30 PROCEDURE — 97112 NEUROMUSCULAR REEDUCATION: CPT

## 2021-06-30 PROCEDURE — 97110 THERAPEUTIC EXERCISES: CPT

## 2021-06-30 NOTE — PROGRESS NOTES
PT DAILY TREATMENT NOTE     Patient Name: Odalis Jacinto  Date:2021  : 1950  [x]  Patient  Verified  Payor: AARP MEDICARE COMPLETE / [facility] / Product Type: Managed Care Medicare /    In time:3:29  Out time:4:20  Total Treatment Time (min): 51  Visit #: 2 of 8    Medicare/BCBS Only   Total Timed Codes (min):  41 1:1 Treatment Time:  41       Treatment Area: Right hip pain [M25.551]  Low back pain [M54.5]    SUBJECTIVE  Pain Level (0-10 scale): back 4; hip 7  Any medication changes, allergies to medications, adverse drug reactions, diagnosis change, or new procedure performed?: [x] No    [] Yes (see summary sheet for update)  Subjective functional status/changes:   [] No changes reported  Patient reports that prolong sitting aggravates his back. OBJECTIVE    Modality rationale: decrease pain to improve the patients ability to decrease difficulty while performing tasks. Min Type Additional Details   10 [x]  Ice     []  heat  []  Ice massage  []  Laser   []  Anodyne Position:sidelying   Location:right hip   [] Skin assessment post-treatment:  []intact []redness- no adverse reaction    []redness - adverse reaction:       31 min Therapeutic Exercise:  [x] See flow sheet :   Rationale: increase ROM, increase strength, improve coordination, improve balance and increase proprioception to improve the patients ability to increase tolerance to activities. 10 min Neuromuscular Re-education:  [x]  See flow sheet : emphasis on glut activation. Rationale: increase ROM, increase strength, improve coordination, improve balance and increase proprioception  to improve the patients ability to increase ease with standing tolerance.            With   [] TE   [] TA   [] neuro   [] other: Patient Education: [x] Review HEP    [] Progressed/Changed HEP based on:   [] positioning   [] body mechanics   [] transfers   [] heat/ice application    [] other:      Other Objective/Functional Measures: pain range 3/10-10/10. Pain Level (0-10 scale) post treatment: 4    ASSESSMENT/Changes in Function: Initiated exercises per POC. Patient ambulates with no AD with a lateral shift and forward flexed posture. Patient will continue to benefit from skilled PT services to modify and progress therapeutic interventions, address functional mobility deficits, address ROM deficits, address strength deficits and analyze and address soft tissue restrictions to attain remaining goals. []  See Plan of Care  []  See progress note/recertification  []  See Discharge Summary         Progress towards goals / Updated goals:  Short Term Goals: To be accomplished in 1 weeks:  1.  Patient will become proficient in their HEP and will be compliant in performing that program.  Evaluation:   Patient given a written/illustrated HEP. Current: Progressing: patient has initiated without full compliance. 6/30/21     Long Term Goals: To be accomplished in 4 weeks:  1. Patient's pain level will be 1/10-4/10 with activity in order to improve patient's ability to perform normal ADLs. Evaluation:  1/10-8/10  Current: regressed: pain range 3/10-10/10. 6/30/21  2. Patient will demonstrate AROM Lumbar Spine extension with less than 25% limitation and minimal pain to increase ease of ADLs. Evaluation:  AROM L/S extension limited 25% with pain. 3. Patient will increase FOTO score to 66 to indicate increased functional mobility. Evaluation:  58  4. Patient will tolerate walking 1/2 mile with little to no difficulty. Evaluation:  Walking a mile is limited a lot. Current: remains: walking 1/2 mile limited a lot.  6/30/21       PLAN  []  Upgrade activities as tolerated     [x]  Continue plan of care  []  Update interventions per flow sheet       []  Discharge due to:_  []  Other:_      Lalit Prakash PTA 6/30/2021  3:31 PM    Future Appointments   Date Time Provider [place]   7/2/2021  3:30 PM Amadeo Cm Sunny Se, PTA [facility]   7/12/2021  2:30 PM Ariana Lee MD [facility] AMB   7/15/2021  8:20 AM HR [facility]- LAB [facility]   9/28/2021 11:30 AM CA ECHO [facility] AMB   10/26/2021  9:00 AM Alma Cramer MD [facility] AMB   11/3/2021  8:00 AM Yin Gordon MD [facility] AMB   5/3/2022 10:15 AM Tone Medley MD [facility]

## 2021-07-01 LAB
AMPHETAMINE SCREEN, URINE, 701828: NORMAL NG/ML
AMPHETAMINES UR QL SCN: NEGATIVE NG/ML
BARBITURATES SCREEN, URINE, 701831: NORMAL NG/ML
BARBITURATES UR QL SCN: NEGATIVE NG/ML
BENZODIAZ UR QL SCN: NEGATIVE NG/ML
BENZODIAZEPINES SCREEN, URINE, 701832: NORMAL NG/ML
BUPRENORPHINE UR QL: NEGATIVE NG/ML
BUPRENORPHINE, URINE: NORMAL NG/ML
BZE UR QL SCN: NEGATIVE NG/ML
CANNABINOID SCREEN, URINE, 701833: NORMAL NG/ML
CANNABINOIDS UR QL SCN: NEGATIVE NG/ML
COCAINE METAB. SCREEN, URINE, 701834: NORMAL NG/ML
CREAT UR-MCNC: 97.3 MG/DL (ref 20–300)
METHADONE SCREEN, URINE, 701837: NORMAL NG/ML
METHADONE UR QL SCN: NEGATIVE NG/ML
OPIATE SCREEN, URINE, 701835: NORMAL NG/ML
OPIATES UR QL SCN: NEGATIVE NG/ML
OXYCODONE+OXYMORPHONE UR QL SCN: NEGATIVE NG/ML
OXYCODONE/OXYMORPHONE, URINE, 701858: NORMAL NG/ML
PCP UR QL: NEGATIVE NG/ML
PH UR: 5 [PH] (ref 4.5–8.9)
PHENCYCLIDINE SCREEN, URINE, 701836: NORMAL NG/ML
PLEASE NOTE:, 733163: NORMAL
PROPOXYPH UR QL SCN: NEGATIVE NG/ML
PROPOXYPHENE SCREEN, URINE, 701838: NORMAL NG/ML

## 2021-07-02 ENCOUNTER — HOSPITAL ENCOUNTER (OUTPATIENT)
Dept: PHYSICAL THERAPY | Age: 71
Discharge: HOME | End: 2021-07-02
Attending: PHYSICAL MEDICINE & REHABILITATION
Payer: MEDICARE

## 2021-07-02 ENCOUNTER — TELEPHONE (OUTPATIENT)
Dept: SURGERY | Age: 71
End: 2021-07-02

## 2021-07-02 PROCEDURE — 97112 NEUROMUSCULAR REEDUCATION: CPT

## 2021-07-02 PROCEDURE — 97110 THERAPEUTIC EXERCISES: CPT

## 2021-07-02 NOTE — PROGRESS NOTES
PT DAILY TREATMENT NOTE     Patient Name: Brock Early  Date:2021  : 1950  [x]  Patient  Verified  Payor: AARP MEDICARE COMPLETE / [facility] / Product Type: Managed Care Medicare /    In time:3:27  Out time:4:19  Total Treatment Time (min): 51  Visit #: 3 of 8    Medicare/BCBS Only   Total Timed Codes (min):  41 1:1 Treatment Time:  41       Treatment Area: Right hip pain [M25.551]  Low back pain [M54.5]    SUBJECTIVE  Pain Level (0-10 scale): 7  Any medication changes, allergies to medications, adverse drug reactions, diagnosis change, or new procedure performed?: [x] No    [] Yes (see summary sheet for update)  Subjective functional status/changes:   [] No changes reported  Patient reports continue constant pain in right LE and back. OBJECTIVE              Modality rationale: decrease pain to improve the patients ability to decrease difficulty while performing tasks. Min Type Additional Details    10 []? Ice     [x]? heat  []? Ice massage  []? Laser   []? Anodyne Position:supine  Location:back     []? Skin assessment post-treatment:  []?intact []? redness- no adverse reaction    []? redness - adverse reaction:         31 min Therapeutic Exercise:  [x]? See flow sheet :   Rationale: increase ROM, increase strength, improve coordination, improve balance and increase proprioception to improve the patients ability to increase tolerance to activities.         10 min Neuromuscular Re-education:  [x]? See flow sheet : emphasis on glut activation. Rationale: increase ROM, increase strength, improve coordination, improve balance and increase proprioception  to improve the patients ability to increase ease with standing tolerance.          With   [] TE   [] TA   [] neuro   [] other: Patient Education: [x] Review HEP    [] Progressed/Changed HEP based on:   [] positioning   [] body mechanics   [] transfers   [] heat/ice application    [] other:      Other Objective/Functional Measures: see goals      Pain Level (0-10 scale) post treatment: 4    ASSESSMENT/Changes in Function: Patient has not noticed much improvement since starting therapy due to only having 2 follow up appointment since evaluation due to insurance reasons. Patient is limited lumbar extension mobility, ambulation tolerance, and limited with work related tasks of lifting. Patient will continue to benefit from skilled PT services to modify and progress therapeutic interventions, address functional mobility deficits, address ROM deficits, address strength deficits and analyze and address soft tissue restrictions to attain remaining goals. []  See Plan of Care  [x]  See progress note/recertification  []  See Discharge Summary         Progress towards goals / Updated goals:  Short Term Goals: To be accomplished in 1 weeks:  1.  Patient will become proficient in their HEP and will be compliant in performing that program.  Evaluation:   Patient given a written/illustrated HEP. Current: Progressing: patient has initiated without full compliance. 6/30/21     Long Term Goals: To be accomplished in 4 weeks:  1. Patient's pain level will be 1/10-4/10 with activity in order to improve patient's ability to perform normal ADLs. Evaluation:  1/10-8/10  Current: regressed: pain range 3/10-10/10. 6/30/21  2. Patient will demonstrate AROM Lumbar Spine extension with less than 25% limitation and minimal pain to increase ease of ADLs. Evaluation:  AROM L/S extension limited 25% with pain. Current: remains: AROM L/S extension limited 25%. 7/2/21  3. Patient will increase FOTO score to 66 to indicate increased functional mobility. Evaluation:  58  4. Patient will tolerate walking 1/2 mile with little to no difficulty. Evaluation:  Walking a mile is limited a lot. Current: remains: walking 1/2 mile limited a lot.  6/30/21    PLAN  []  Upgrade activities as tolerated     [x]  Continue plan of care  []  Update interventions per flow sheet       []  Discharge due to:_  []  Other:_      Aggie Vazquez, PTA 7/2/2021  3:38 PM    Future Appointments   Date Time Provider [place]   7/12/2021  2:30 PM Nigel Rosales MD [facility] AMB   7/13/2021  8:00 AM Lucrezia Manifold, PTA [facility]   7/15/2021  8:20 AM HR [facility] AMB   7/15/2021  2:45 PM Lucrezia Manifold, PTA [facility]   7/20/2021  8:00 AM Lucrezia Manifold, PTA [facility]   7/22/2021  2:45 PM Lucrezia Manifold, PTA [facility]   7/27/2021  8:00 AM Lucrezia Manifold, PTA [facility]   7/29/2021  3:30 PM JohnSan Antonio Cargo, PT [facility]   9/28/2021 11:30 AM CA ECHO [facility] AMB   10/26/2021  9:00 AM Alma Cramer MD [facility] BS AMB   11/3/2021  8:00 AM Kelvin Andrea MD [facility] AMB   5/3/2022 10:15 AM Karan Dinero MD [address] [facility]

## 2021-07-02 NOTE — TELEPHONE ENCOUNTER
Left message with Mrs. Karan Hammond for Mr. Karan Hammond to contact our office regarding his request that I contact his insurance to inquire what his copayment would be for hernia repair surgery. Called to inform I called Harriett Brewster, call reference number [id number], was informed Mr. Karan Hammond copayment for surgery would be $345.00/surgeon $35.00. Mrs. Karan Hammond states she will give Mr. Karan Hammond the message to contact our office.

## 2021-07-02 NOTE — PROGRESS NOTES
[facility] - [facility]              [address]        [place], [address]   [phone] fax    Continued Plan of Care/ Re-certification for Physical Therapy Services    Patient name: Tami Ramírez Start of Care: 6/3/2021   Referral source: Phillip Rojas* : 1950   Medical/Treatment Diagnosis: Right hip pain [M25.551]  Low back pain [M54.5]  Payor: [address] [place] [address] / Plan: [address] / Product Type: Managed Care Medicare /  Onset Date:2021     Prior Hospitalization: see medical history Provider#: [id number]   Medications: Verified on Patient Summary List    Comorbidities: Arthritis, Back Pain, HBP, Visual Impairment. Prior Level of Function: Works part time; Mosque activities; not very active. Independent in his self care. Visits from Start of Care: 3    Missed Visits: 0    The Plan of Care and following information is based on the patient's Short Term Goals: To be accomplished in 1 weeks:  1. Patient will become proficient in their HEP and will be compliant in performing that program.  Evaluation:   Patient given a written/illustrated HEP. Current: Progressing: patient has initiated without full compliance. 21     Long Term Goals: To be accomplished in 4 weeks:  1. Patient's pain level will be 1/10-4/10 with activity in order to improve patient's ability to perform normal ADLs. Evaluation:  1/10-8/10  Current: regressed: pain range 3/10-10/10. 21  2. Patient will demonstrate AROM Lumbar Spine extension with less than 25% limitation and minimal pain to increase ease of ADLs. Evaluation:  AROM L/S extension limited 25% with pain. Current: remains: AROM L/S extension limited 25%. 21  3. Patient will increase FOTO score to 66 to indicate increased functional mobility. Evaluation:  58  4. Patient will tolerate walking 1/2 mile with little to no difficulty.   Evaluation:  Walking a mile is limited a lot.  Current: remains: walking 1/2 mile limited a lot. 6/30/21    Key functional changes: see goals above      Problems/ barriers to goal attainment: N/S     Problem List: pain affecting function, decrease ROM, decrease strength, edema affecting function, impaired gait/ balance, decrease ADL/ functional abilitiies, decrease activity tolerance, decrease flexibility/ joint mobility and decrease transfer abilities    Treatment Plan: Therapeutic exercise, Therapeutic activities, Neuromuscular re-education, Physical agent/modality, Gait/balance training, Manual therapy, Patient education, Self Care training, Functional mobility training, Home safety training and Stair training     Patient Goal (s) has been updated and includes: \"I want to feel better\"     Goals for this certification period to be accomplished in 4 weeks:  Continue with unmet goals. Frequency / Duration: Patient to be seen 2 times per week for 4 weeks:    Assessment / Recommendations:Patient has not noticed much improvement since starting therapy due to only having 2 follow up appointment since evaluation due to insurance reasons. Patient is limited lumbar extension mobility, ambulation tolerance, and limited with work related tasks of lifting. Patient will continue to benefit from skilled PT services to modify and progress therapeutic interventions, address functional mobility deficits, address ROM deficits, address strength deficits and analyze and address soft tissue restrictions to attain remaining goals. Certification Period: 7/6/2021-8/4/2021    Wild Irene, \[facility] [place] [facility]\"" 7/2/2021 3:53 PM    ________________________________________________________________________  I certify that the above Therapy Services are being furnished while the patient is under my care. I agree with the treatment plan and certify that this therapy is necessary. [] I have read the above and request that my patient continue as recommended.   [] I have read the above report and request that my patient continue therapy with the following changes/special instructions: _______________________________________  [] I have read the above report and request that my patient be discharged from therapy    Physician's Signature:____________Date:_________TIME:________     Lori Kyle*  ** Signature, Date and Time must be completed for valid certification **  Please sign and return to [facility] - [address]        [place], [address]   [phone] fax

## 2021-07-13 ENCOUNTER — HOSPITAL ENCOUNTER (OUTPATIENT)
Dept: PHYSICAL THERAPY | Age: 71
Discharge: HOME | End: 2021-07-13
Attending: PHYSICAL MEDICINE & REHABILITATION
Payer: MEDICARE

## 2021-07-13 PROCEDURE — 97112 NEUROMUSCULAR REEDUCATION: CPT

## 2021-07-13 PROCEDURE — 97110 THERAPEUTIC EXERCISES: CPT

## 2021-07-13 NOTE — PROGRESS NOTES
PT DAILY TREATMENT NOTE     Patient Name: Luis Drew  Date:2021  : 1950  [x]  Patient  Verified  Payor: [facility] MEDICARE COMPLETE / [facility] / Product Type: Managed Care Medicare /    In time:7:57  Out time:8:54  Total Treatment Time (min): 62  Visit #: 1 of 8 (re-cert)    Medicare/BCBS Only   Total Timed Codes (min):  47 1:1 Treatment Time:  47       Treatment Area: Right hip pain [M25.551]  Low back pain [M54.5]    SUBJECTIVE  Pain Level (0-10 scale): 4  Any medication changes, allergies to medications, adverse drug reactions, diagnosis change, or new procedure performed?: [x] No    [] Yes (see summary sheet for update)  Subjective functional status/changes:   [] No changes reported  Patient reports that bending limites him with performing work related tasks. OBJECTIVE                Modality rationale: decrease pain to improve the patients ability to decrease difficulty while performing tasks.    Min Type Additional Details     10 []??  Ice     [x]? ?  heat  []? ?  Ice massage  []? ?  Laser   []? ?  Anodyne Position:supine  Location:back      []? ? Skin assessment post-treatment:  []??intact []? ?redness- no adverse reaction    []? ?redness - adverse reaction:         32 min Therapeutic Exercise:  [x]? ? See flow sheet :   Rationale: increase ROM, increase strength, improve coordination, improve balance and increase proprioception to improve the patients ability to increase tolerance to activities.         15 min Neuromuscular Re-education:  [x]? ?  See flow sheet : emphasis on glut activation.    Rationale: increase ROM, increase strength, improve coordination, improve balance and increase proprioception  to improve the patients ability to increase ease with standing tolerance.             With   [] TE   [] TA   [] neuro   [] other: Patient Education: [x] Review HEP    [] Progressed/Changed HEP based on:   [] positioning   [] body mechanics   [] transfers   [] heat/ice application    [] other:      Other Objective/Functional Measures: pain range 4/10-8/10. Pain Level (0-10 scale) post treatment: 0    ASSESSMENT/Changes in Function: Progressing with weightbearing exercises to increase ease with standing and bending tolerance. Patient will continue to benefit from skilled PT services to modify and progress therapeutic interventions, address functional mobility deficits, address ROM deficits, address strength deficits and analyze and address soft tissue restrictions to attain remaining goals. []  See Plan of Care  []  See progress note/recertification  []  See Discharge Summary         Progress towards goals / Updated goals:  Short Term Goals: To be accomplished in 1 weeks:  1.  Patient will become proficient in their HEP and will be compliant in performing that program.  Last PN:  patient has initiated without full compliance. 6/30/21     Long Term Goals: To be accomplished in 4 weeks:  1. Patient's pain level will be 1/10-4/10 with activity in order to improve patient's ability to perform normal ADLs. Last PN:  pain range 3/10-10/10. 6/30/21  Current: Progressing: pain range 4/10-8/10. 7/13/21  2. Patient will demonstrate AROM Lumbar Spine extension with less than 25% limitation and minimal pain to increase ease of ADLs. Last PN:  AROM L/S extension limited 25%. 7/2/21  3. Patient will increase FOTO score to 66 to indicate increased functional mobility. Last PN: 57.   4. Patient will tolerate walking 1/2 mile with little to no difficulty. Last PN:  walking 1/2 mile limited a lot.  6/30/21    PLAN  []  Upgrade activities as tolerated     [x]  Continue plan of care  []  Update interventions per flow sheet       []  Discharge due to:_  []  Other:_      Ivelisse Kruger PTA 7/13/2021  8:01 AM    Future Appointments   Date Time Provider Wing Cleaning   7/15/2021  8:20 AM HR [facility]   7/15/2021  2:45 PM Nancy Ramos PTA [facility]   7/20/2021  8:00 AM Creasie Madden, PTA [facility]   7/22/2021  2:45 PM Creasie Madden, PTA [facility]   7/27/2021  8:00 AM Creasie Madden, PTA [facility]   7/29/2021  3:30 PM Sharon Lynch, PT [facility]   9/28/2021 11:30 AM [facility] BS AMB   10/26/2021  9:00 AM Alma Cramer MD [facility] BS AMB   11/3/2021  8:00 AM Luis F Bravo MD [facility] BS AMB   5/3/2022 10:15 AM Ran Garcia MD [place] [id number]

## 2021-07-15 ENCOUNTER — LAB ONLY (OUTPATIENT)
Dept: FAMILY MEDICINE CLINIC | Age: 71
End: 2021-07-15
Payer: MEDICARE

## 2021-07-15 ENCOUNTER — HOSPITAL ENCOUNTER (OUTPATIENT)
Dept: PHYSICAL THERAPY | Age: 71
Discharge: HOME | End: 2021-07-15
Attending: PHYSICAL MEDICINE & REHABILITATION
Payer: MEDICARE

## 2021-07-15 ENCOUNTER — HOSPITAL ENCOUNTER (OUTPATIENT)
Dept: LAB | Age: 71
Discharge: HOME | End: 2021-07-15
Payer: MEDICARE

## 2021-07-15 DIAGNOSIS — E78.5 DYSLIPIDEMIA: ICD-10-CM

## 2021-07-15 DIAGNOSIS — Z01.89 ENCOUNTER FOR LABORATORY TEST: Primary | ICD-10-CM

## 2021-07-15 DIAGNOSIS — I10 ESSENTIAL HYPERTENSION: ICD-10-CM

## 2021-07-15 DIAGNOSIS — Z12.5 SCREENING FOR MALIGNANT NEOPLASM OF PROSTATE: ICD-10-CM

## 2021-07-15 LAB
ALBUMIN SERPL-MCNC: 4.1 G/DL (ref 3.4–5)
ALBUMIN/GLOB SERPL: 1.2 {RATIO} (ref 0.8–1.7)
ALP SERPL-CCNC: 74 U/L (ref 45–117)
ALT SERPL-CCNC: 13 U/L (ref 16–61)
ANION GAP SERPL CALC-SCNC: 3 MMOL/L (ref 3–18)
AST SERPL-CCNC: 17 U/L (ref 10–38)
BASOPHILS # BLD: 0.1 K/UL (ref 0–0.1)
BASOPHILS NFR BLD: 1 % (ref 0–2)
BILIRUB SERPL-MCNC: 0.6 MG/DL (ref 0.2–1)
BUN SERPL-MCNC: 19 MG/DL (ref 7–18)
BUN/CREAT SERPL: 19 (ref 12–20)
CALCIUM SERPL-MCNC: 9.3 MG/DL (ref 8.5–10.1)
CHLORIDE SERPL-SCNC: 108 MMOL/L (ref 100–111)
CHOLEST SERPL-MCNC: 157 MG/DL
CO2 SERPL-SCNC: 30 MMOL/L (ref 21–32)
CREAT SERPL-MCNC: 0.98 MG/DL (ref 0.6–1.3)
DIFFERENTIAL METHOD BLD: ABNORMAL
EOSINOPHIL # BLD: 0.1 K/UL (ref 0–0.4)
EOSINOPHIL NFR BLD: 2 % (ref 0–5)
ERYTHROCYTE [DISTWIDTH] IN BLOOD BY AUTOMATED COUNT: 11.9 % (ref 11.6–14.5)
GLOBULIN SER CALC-MCNC: 3.3 G/DL (ref 2–4)
GLUCOSE SERPL-MCNC: 103 MG/DL (ref 74–99)
HCT VFR BLD AUTO: 44.6 % (ref 36–48)
HDLC SERPL-MCNC: 63 MG/DL (ref 40–60)
HDLC SERPL: 2.5 {RATIO} (ref 0–5)
HGB BLD-MCNC: 14.1 G/DL (ref 13–16)
LDLC SERPL CALC-MCNC: 84.6 MG/DL (ref 0–100)
LIPID PROFILE,FLP: ABNORMAL
LYMPHOCYTES # BLD: 2.4 K/UL (ref 0.9–3.6)
LYMPHOCYTES NFR BLD: 45 % (ref 21–52)
MCH RBC QN AUTO: 27.6 PG (ref 24–34)
MCHC RBC AUTO-ENTMCNC: 31.6 G/DL (ref 31–37)
MCV RBC AUTO: 87.3 FL (ref 74–97)
MONOCYTES # BLD: 0.6 K/UL (ref 0.05–1.2)
MONOCYTES NFR BLD: 11 % (ref 3–10)
NEUTS SEG # BLD: 2.2 K/UL (ref 1.8–8)
NEUTS SEG NFR BLD: 41 % (ref 40–73)
PLATELET # BLD AUTO: 247 K/UL (ref 135–420)
PMV BLD AUTO: 10.5 FL (ref 9.2–11.8)
POTASSIUM SERPL-SCNC: 3.8 MMOL/L (ref 3.5–5.5)
PROT SERPL-MCNC: 7.4 G/DL (ref 6.4–8.2)
PSA SERPL-MCNC: 6.8 NG/ML (ref 0–4)
RBC # BLD AUTO: 5.11 M/UL (ref 4.35–5.65)
SODIUM SERPL-SCNC: 141 MMOL/L (ref 136–145)
TRIGL SERPL-MCNC: 47 MG/DL (ref ?–150)
VLDLC SERPL CALC-MCNC: 9.4 MG/DL
WBC # BLD AUTO: 5.4 K/UL (ref 4.6–13.2)

## 2021-07-15 PROCEDURE — 85025 COMPLETE CBC W/AUTO DIFF WBC: CPT

## 2021-07-15 PROCEDURE — 36415 COLL VENOUS BLD VENIPUNCTURE: CPT

## 2021-07-15 PROCEDURE — 80053 COMPREHEN METABOLIC PANEL: CPT

## 2021-07-15 PROCEDURE — 97112 NEUROMUSCULAR REEDUCATION: CPT

## 2021-07-15 PROCEDURE — 97110 THERAPEUTIC EXERCISES: CPT

## 2021-07-15 PROCEDURE — 36415 COLL VENOUS BLD VENIPUNCTURE: CPT | Performed by: FAMILY MEDICINE

## 2021-07-15 PROCEDURE — 84153 ASSAY OF PSA TOTAL: CPT

## 2021-07-15 PROCEDURE — 80061 LIPID PANEL: CPT

## 2021-07-15 NOTE — PROGRESS NOTES
PT DAILY TREATMENT NOTE     Patient Name: Alli Mccoy  Date:7/15/2021  : 1950  [x]  Patient  Verified  Payor: [facility] MEDICARE COMPLETE / [facility] / Product Type: Managed Care Medicare /    In time:2:45  Out time:3:38  Total Treatment Time (min): 48  Visit #: 2 of 8    Medicare/BCBS Only   Total Timed Codes (min):  43 1:1 Treatment Time:  43       Treatment Area: Right hip pain [M25.551]  Low back pain [M54.5]    SUBJECTIVE  Pain Level (0-10 scale): 7  Any medication changes, allergies to medications, adverse drug reactions, diagnosis change, or new procedure performed?: [x] No    [] Yes (see summary sheet for update)  Subjective functional status/changes:   [] No changes reported  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 Patient reports no performing home exercises. OBJECTIVE               Modality rationale: decrease pain to improve the patients ability to decrease difficulty while performing tasks.    Min Type Additional Details     10 []???  Ice     [x]? ??  heat  []? ??  Ice massage  []? ??  Laser   []? ??  Anodyne Position:supine  Location:back      []??? Skin assessment post-treatment:  []???intact []? ??redness- no adverse reaction    []? ??redness - adverse reaction:         28 min Therapeutic Exercise:  [x]? ?? See flow sheet :   Rationale: increase ROM, increase strength, improve coordination, improve balance and increase proprioception to improve the patients ability to increase tolerance to activities.         15 min Neuromuscular Re-education:  [x]? ??  See flow sheet : emphasis on glut activation.    Rationale: increase ROM, increase strength, improve coordination, improve balance and increase proprioception  to improve the patients ability to increase ease with standing tolerance.         With   [] TE   [] TA   [] neuro   [] other: Patient Education: [x] Review HEP    [] Progressed/Changed HEP based on:   [] positioning   [] body mechanics   [] transfers   [] heat/ice application    [] other:      Other Objective/Functional Measures: unable to perform SLS for 30 sec without HHA. Pain Level (0-10 scale) post treatment: 4    ASSESSMENT/Changes in Function: Educated patient on importance of performing HEP. Patient will continue to benefit from skilled PT services to modify and progress therapeutic interventions, address functional mobility deficits, address ROM deficits, address strength deficits and analyze and address soft tissue restrictions to attain remaining goals. []  See Plan of Care  []  See progress note/recertification  []  See Discharge Summary         Progress towards goals / Updated goals:  Short Term Goals: To be accomplished in 1 weeks:  1.  Patient will become proficient in their HEP and will be compliant in performing that program.  Last PN:  patient has initiated without full compliance. 6/30/21     Long Term Goals: To be accomplished in 4 weeks:  1. Patient's pain level will be 1/10-4/10 with activity in order to improve patient's ability to perform normal ADLs. Last PN:  pain range 3/10-10/10. 6/30/21  Current: Progressing: pain range 4/10-8/10. 7/13/21  2. Patient will demonstrate AROM Lumbar Spine extension with less than 25% limitation and minimal pain to increase ease of ADLs. Last PN:  AROM L/S extension limited 25%. 7/2/21  3. Patient will increase FOTO score to 66 to indicate increased functional mobility. Last PN: 57.   4. Patient will tolerate walking 1/2 mile with little to no difficulty. Last PN:  walking 1/2 mile limited a lot.  6/30/21    PLAN  []  Upgrade activities as tolerated     [x]  Continue plan of care  []  Update interventions per flow sheet       []  Discharge due to:_  []  Other:_      Butch Meier, HYACINTH 7/15/2021  2:53 PM    Future Appointments   Date Time Provider Wing Cleaning   7/20/2021  8:00 AM Sheyla Suarez [facility]   7/22/2021  2:45 PM Gilberto Harden PTA [facility]   7/27/2021  8:00 AM Marcell Bagley [facility]   7/29/2021  3:30 PM Daniel Alfredo, PT [facility]   9/28/2021 11:30 AM [facility] BS AMB   10/26/2021  9:00 AM Alma Cramer MD [facility] BS AMB   11/3/2021  8:00 AM Lois Chris MD [facility] BS AMB   5/3/2022 10:15 AM [facility]

## 2021-07-15 NOTE — PROGRESS NOTES
Patient in for labs today. Labs ordered by PCP. Venipuncture to the left arm today. Patient tolerated well and there are no concerns.

## 2021-07-16 ENCOUNTER — TELEPHONE (OUTPATIENT)
Dept: SURGERY | Age: 71
End: 2021-07-16

## 2021-07-16 NOTE — TELEPHONE ENCOUNTER
Mr. Ralph David called inquiring about financial responsibility for surgery vs US guided biopsy of lymph node. I reviewed copayment information with Mr. Ralph David. Mr. Ralph David want to know his options re: payment/called financial counselor and left voicemail message for counselor to contact Mr. Ralph David.

## 2021-07-19 DIAGNOSIS — R73.9 HYPERGLYCEMIA: ICD-10-CM

## 2021-07-19 DIAGNOSIS — R97.20 ELEVATED PSA: Primary | ICD-10-CM

## 2021-07-19 NOTE — PROGRESS NOTES
Please let patient know his PSA is elevated. This indicates elevation in his prostate test.  I will refer him to the urologist for further evaluation. His blood glucose is also elevated slightly. We will check an HbA1c at next visit.   Maite Jacobs MD

## 2021-07-20 ENCOUNTER — HOSPITAL ENCOUNTER (OUTPATIENT)
Dept: PHYSICAL THERAPY | Age: 71
Discharge: HOME | End: 2021-07-20
Attending: PHYSICAL MEDICINE & REHABILITATION
Payer: MEDICARE

## 2021-07-20 PROCEDURE — 97110 THERAPEUTIC EXERCISES: CPT

## 2021-07-20 PROCEDURE — 97112 NEUROMUSCULAR REEDUCATION: CPT

## 2021-07-20 NOTE — PROGRESS NOTES
PT DAILY TREATMENT NOTE     Patient Name: Pierre Hatfield  Date:2021  : 1950  [x]  Patient  Verified  Payor: [employer] MEDICARE COMPLETE / [facility] / Product Type: Managed Care Medicare /    In time:8:00  Out time:8:45  Total Treatment Time (min): 45  Visit #: 3 of 8    Medicare/BCBS Only   Total Timed Codes (min):  45 1:1 Treatment Time:  42       Treatment Area: Right hip pain [M25.551]  Low back pain [M54.5]    SUBJECTIVE  Pain Level (0-10 scale): 3-4  Any medication changes, allergies to medications, adverse drug reactions, diagnosis change, or new procedure performed?: [x] No    [] Yes (see summary sheet for update)  Subjective functional status/changes:   [] No changes reported  Patient continues to have increase in back pain while performing work tasks. OBJECTIVE         30 min Therapeutic Exercise:  [x]? ??? See flow sheet :   Rationale: increase ROM, increase strength, improve coordination, improve balance and increase proprioception to improve the patients ability to increase tolerance to activities.         15 min Neuromuscular Re-education:  [x]? ???  See flow sheet : emphasis on glut activation.    Rationale: increase ROM, increase strength, improve coordination, improve balance and increase proprioception  to improve the patients ability to increase ease with standing tolerance.             With   [] TE   [] TA   [] neuro   [] other: Patient Education: [x] Review HEP    [] Progressed/Changed HEP based on:   [] positioning   [] body mechanics   [] transfers   [] heat/ice application    [] other:      Other Objective/Functional Measures: walking 1/2 mile limited a little. Pain Level (0-10 scale) post treatment: 2    ASSESSMENT/Changes in Function: Continue to review and encourage patient to perform work task with proper body mechanics to manage pain while at work.      Patient will continue to benefit from skilled PT services to modify and progress therapeutic interventions, address functional mobility deficits, address ROM deficits, address strength deficits and analyze and address soft tissue restrictions to attain remaining goals. []  See Plan of Care  []  See progress note/recertification  []  See Discharge Summary         Progress towards goals / Updated goals:  Short Term Goals: To be accomplished in 1 weeks:  1.  Patient will become proficient in their HEP and will be compliant in performing that program.  Last PN:  patient has initiated without full compliance. 6/30/21     Long Term Goals: To be accomplished in 4 weeks:  1. Patient's pain level will be 1/10-4/10 with activity in order to improve patient's ability to perform normal ADLs. Last PN:  pain range 3/10-10/10. 6/30/21  Current: Progressing: pain range 4/10-8/10. 7/13/21  2. Patient will demonstrate AROM Lumbar Spine extension with less than 25% limitation and minimal pain to increase ease of ADLs. Last PN:  AROM L/S extension limited 25%. 7/2/21  3. Patient will increase FOTO score to 66 to indicate increased functional mobility. Last PN: 57.   4. Patient will tolerate walking 1/2 mile with little to no difficulty. Last PN:  walking 1/2 mile limited a lot. 6/30/21  Current: Progressing: walking 1/2 mile limited a little.  7/20/21    PLAN  []  Upgrade activities as tolerated     [x]  Continue plan of care  []  Update interventions per flow sheet       []  Discharge due to:_  []  Other:_      Monica Davidson PTA 7/20/2021  8:06 AM    Future Appointments   Date Time Provider Wing Cleaning   7/22/2021  2:45 PM Sheyla Oilvier [facility]   7/27/2021  8:00 AM Nighat Chi PTA [facility]   7/29/2021  3:30 PM Parviz Marie PT [facility]   9/28/2021 11:30 AM [facility] BS AMB   10/26/2021  9:00 AM Alma Cramer MD [facility] AMB   11/3/2021  8:00 AM Eber Sesay MD [facility] BS AMB   5/3/2022 10:15 AM MD Jayla Aguirre

## 2021-07-22 ENCOUNTER — HOSPITAL ENCOUNTER (OUTPATIENT)
Dept: PHYSICAL THERAPY | Age: 71
Discharge: HOME | End: 2021-07-22
Attending: PHYSICAL MEDICINE & REHABILITATION
Payer: MEDICARE

## 2021-07-22 PROCEDURE — 97112 NEUROMUSCULAR REEDUCATION: CPT

## 2021-07-22 PROCEDURE — 97110 THERAPEUTIC EXERCISES: CPT

## 2021-07-22 NOTE — PROGRESS NOTES
PT DAILY TREATMENT NOTE     Patient Name: Alli Mccoy  Date:2021  : 1950  [x]  Patient  Verified  Payor: [facility] MEDICARE COMPLETE / [facility] / Product Type: Managed Care Medicare /    In time:2:47  Out time:3:41  Total Treatment Time (min): 54  Visit #: 4 of 8    Medicare/BCBS Only   Total Timed Codes (min):  44 1:1 Treatment Time:  44       Treatment Area: Right hip pain [M25.551]  Low back pain [M54.5]    SUBJECTIVE  Pain Level (0-10 scale): 6  Any medication changes, allergies to medications, adverse drug reactions, diagnosis change, or new procedure performed?: [x] No    [] Yes (see summary sheet for update)  Subjective functional status/changes:   [] No changes reported  Patients that work activities continue to increase in back pain. OBJECTIVE                   Modality rationale: decrease pain to improve the patients ability to decrease difficulty while performing tasks.    Min Type Additional Details     10 []????  Ice     [x]? ???  heat  []????  Ice massage  []????  Laser   []????  Anodyne Position:supine  Location:back      []???? Skin assessment post-treatment:  []????intact []? ???redness- no adverse reaction    []? ???redness - adverse reaction:         29 min Therapeutic Exercise:  [x]? ???? See flow sheet :   Rationale: increase ROM, increase strength, improve coordination, improve balance and increase proprioception to improve the patients ability to increase tolerance to activities.         15 min Neuromuscular Re-education:  [x]? ????  See flow sheet : emphasis on glut activation.    Rationale: increase ROM, increase strength, improve coordination, improve balance and increase proprioception  to improve the patients ability to increase ease with standing tolerance.                With   [] TE   [] TA   [] neuro   [] other: Patient Education: [x] Review HEP    [] Progressed/Changed HEP based on:   [] positioning   [] body mechanics   [] transfers   [] heat/ice application    [] other:      Other Objective/Functional Measures: Pt reports not performing HEP. Pain Level (0-10 scale) post treatment: 5    ASSESSMENT/Changes in Function: Educated patient on importance of performing HEP to notice a carry over from each session. Patient will continue to benefit from skilled PT services to modify and progress therapeutic interventions, address functional mobility deficits, address ROM deficits, address strength deficits and analyze and address soft tissue restrictions to attain remaining goals. []  See Plan of Care  []  See progress note/recertification  []  See Discharge Summary         Progress towards goals / Updated goals:  Short Term Goals: To be accomplished in 1 weeks:  1.  Patient will become proficient in their HEP and will be compliant in performing that program.  Last PN:  patient has initiated without full compliance. 6/30/21  Current: Not met: Pt reports not performing HEP. 7/22/21     Long Term Goals: To be accomplished in 4 weeks:  1. Patient's pain level will be 1/10-4/10 with activity in order to improve patient's ability to perform normal ADLs. Last PN:  pain range 3/10-10/10. 6/30/21  Current: Progressing: pain range 4/10-8/10. 7/13/21  2. Patient will demonstrate AROM Lumbar Spine extension with less than 25% limitation and minimal pain to increase ease of ADLs. Last PN:  AROM L/S extension limited 25%. 7/2/21  3. Patient will increase FOTO score to 66 to indicate increased functional mobility. Last PN: 57.   4. Patient will tolerate walking 1/2 mile with little to no difficulty. Last PN:  walking 1/2 mile limited a lot. 6/30/21  Current: Progressing: walking 1/2 mile limited a little.  7/20/21    PLAN  []  Upgrade activities as tolerated     [x]  Continue plan of care  []  Update interventions per flow sheet       []  Discharge due to:_  []  Other:_      Juan Antonio Momin PTA 7/22/2021  2:57 PM    Future Appointments   Date Time Provider Wing Cleaning   7/27/2021  8:00 AM Sheyla Chowdhury [facility]   7/29/2021  3:30 PM David Hills PT [facility]   9/28/2021 11:30 AM [facility] BS AMB   10/26/2021  9:00 AM Alma Cramer MD [facility] BS AMB   11/3/2021  8:00 AM Ajith Puri MD [facility] BS AMB   5/3/2022 10:15 AM MD Katrina EricksonColumbus

## 2021-07-27 ENCOUNTER — HOSPITAL ENCOUNTER (OUTPATIENT)
Dept: PHYSICAL THERAPY | Age: 71
Discharge: HOME | End: 2021-07-27
Attending: PHYSICAL MEDICINE & REHABILITATION
Payer: MEDICARE

## 2021-07-27 PROCEDURE — 97110 THERAPEUTIC EXERCISES: CPT

## 2021-07-27 PROCEDURE — 97112 NEUROMUSCULAR REEDUCATION: CPT

## 2021-07-27 NOTE — PROGRESS NOTES
PT DAILY TREATMENT NOTE     Patient Name: Daryl Lui  Date:2021  : 1950  [x]  Patient  Verified  Payor: [facility] MEDICARE COMPLETE / Plan: [facility] / Product Type: Managed Care Medicare /    In time:7:58  Out time:8:47  Total Treatment Time (min): 49  Visit #: 5 of 8    Medicare/BCBS Only   Total Timed Codes (min):  49 1:1 Treatment Time:  49       Treatment Area: Right hip pain [M25.551]  Low back pain [M54.5]    SUBJECTIVE  Pain Level (0-10 scale): 3-4  Any medication changes, allergies to medications, adverse drug reactions, diagnosis change, or new procedure performed?: [x] No    [] Yes (see summary sheet for update)  Subjective functional status/changes:   [] No changes reported  Patient reports his work continue to aggravate his back. OBJECTIVE          34 min Therapeutic Exercise:  [x]?????? See flow sheet :   Rationale: increase ROM, increase strength, improve coordination, improve balance and increase proprioception to improve the patients ability to increase tolerance to activities.         15 min Neuromuscular Re-education:  [x]??????  See flow sheet : emphasis on glut activation.    Rationale: increase ROM, increase strength, improve coordination, improve balance and increase proprioception  to improve the patients ability to increase ease with standing tolerance.                With   [] TE   [] TA   [] neuro   [] other: Patient Education: [x] Review HEP    [] Progressed/Changed HEP based on:   [] positioning   [] body mechanics   [] transfers   [] heat/ice application    [] other:      Other Objective/Functional Measures: see goals      Pain Level (0-10 scale) post treatment: 2-3    ASSESSMENT/Changes in Function: Patient has made little progress toward LTG due to no compliance of HEP and heavy lifting required at work with poor body mechanics.         []  See Plan of Care  []  See progress note/recertification  [x]  See Discharge Summary         Progress towards goals / Updated goals:  Short Term Goals: To be accomplished in 1 weeks:  1.  Patient will become proficient in their HEP and will be compliant in performing that program.  Last PN:  patient has initiated without full compliance. 6/30/21  Current: Not met: Pt reports not performing HEP. 7/27/21     Long Term Goals: To be accomplished in 4 weeks:  1. Patient's pain level will be 1/10-4/10 with activity in order to improve patient's ability to perform normal ADLs. Last PN:  pain range 3/10-10/10. 6/30/21  Current: Progressing: pain range 2/10-9/10. 7/27/21  2. Patient will demonstrate AROM Lumbar Spine extension with less than 25% limitation and minimal pain to increase ease of ADLs. Last PN:  AROM L/S extension limited 25%. 7/2/21  Current: MET: AROM L/S extension limited 20%. , 7/27/21  3. Patient will increase FOTO score to 66 to indicate increased functional mobility. Last PN: 57.  Currnet: regressed: FOTO = 56. 7/27/21   4. Patient will tolerate walking 1/2 mile with little to no difficulty. Last PN:  walking 1/2 mile limited a lot. 6/30/21  Current: MET: walking 1/2 mile limited a little.  7/27/21       PLAN  []  Upgrade activities as tolerated     []  Continue plan of care  []  Update interventions per flow sheet       [x]  Discharge due to:_per patient request.   []  Other:_      Justin Hummel, HYACINTH 7/27/2021  8:01 AM    Future Appointments   Date Time Provider [place]   7/29/2021  3:30 PM [place], PT [facility]   9/28/2021 11:30 AM CA ECHO DIANNE BS AMB   10/26/2021  9:00 AM Alma Cramer MD S[facility] BS AMB   11/3/2021  8:00 AM Shashank Cordero MD CAS BS AMB   5/3/2022 10:15 AM Martín Abel MD [address]

## 2021-07-27 NOTE — PROGRESS NOTES
[facility] - [facility]              [address]        [place], [address] [place]   [phone] fax    Discharge Summary  Patient name: Angelica Moulton Start of Care: 6/3/2021   Referral source: Sarai Garza* : 1950   Medical/Treatment Diagnosis: Right hip pain [M25.551]  Low back pain [M54.5]  Payor: [address] [place] [address] / Plan: [address] / Product Type: Managed Care Medicare /  Onset Date:2021     Prior Hospitalization: see medical history Provider#: [id number]   Medications: Verified on Patient Summary List    Comorbidities: Arthritis, Back Pain, HBP, Visual Impairment. Prior Level of Function: Works part time; Confucianist activities; not very active. Independent in his self care. Visits from Start of Care: 8    Missed Visits: 0  Reporting Period : 7/3/2021 to 2021    Summary of Care:  Short Term Goals: To be accomplished in 1 weeks:  1.  Patient will become proficient in their HEP and will be compliant in performing that program.  Last PN:  patient has initiated without full compliance. 21  Current:  Pt reports not performing HEP. 21  Not met.     Long Term Goals: To be accomplished in 4 weeks:  1. Patient's pain level will be 1/10-4/10 with activity in order to improve patient's ability to perform normal ADLs. Last PN:  pain range 3/10-10/10. 21  Current:  Pain range 2/10-9/10. 21. No significant change. 2. Patient will demonstrate AROM Lumbar Spine extension with less than 25% limitation and minimal pain to increase ease of ADLs. Last PN:  AROM L/S extension limited 25%. 21  Current:  AROM L/S extension limited 20%. , 21  Goal Met.    3. Patient will increase FOTO score to 66 to indicate increased functional mobility. Last PN: 57.  Current:  FOTO = 56. 7  Regressed. 4. Patient will tolerate walking 1/2 mile with little to no difficulty.   Last PN:  walking 1/2 mile limited a lot. 6/30/21  Current:  Walking 1/2 mile limited a little. 7/27/21  Goal Met.     ASSESSMENT/RECOMMENDATIONS:  Patient's pain level has not significantly changed and his functional activity per FOTO has regressed. He has been non compliant with his HEP and his work requires lifting which exacerbates his pain.     [x]Discontinue therapy: []Patient has reached or is progressing toward set goals      []Patient is non-compliant or has abdicated      [x]Due to lack of appreciable progress towards set goals    Lopez M[place], PT 7/27/2021 10:17 AM

## 2021-07-27 NOTE — PROGRESS NOTES
Physical Therapy Discharge Instructions      [facility] - [facility]   [address], [address] [place]   [phone] fax    Patient: Ricci Gorman  : 1950      Continue Home Exercise Program 1 times per day for 4 weeks, then decrease to 3 times per week      Continue with    [x] Ice  as needed      [x] Heat           Follow up with MD:     [] Upon completion of therapy     [x] As needed      Recommendations:     [x]   Return to activity with home program    []   Return to activity with the following modifications:       []Post Rehab Program    []Join Independent aquatic program     []Return to/join local gym      Elyse Galdamez PTA 2021 8:15 AM

## 2021-07-28 ENCOUNTER — TELEPHONE (OUTPATIENT)
Dept: SURGERY | Age: 71
End: 2021-07-28

## 2021-07-28 NOTE — TELEPHONE ENCOUNTER
Mr. Jb Boyce called requesting to schedule surgery for October 1, 2021, states he has an echocardiogram scheduled on September 28, 2021. Mr. Jb Boyce states he was waiting on a call from the finance department at [facility] to discuss copayment/payment options re: cppayment $345 and he's trying to decide if he should have the excisional biopsy or the US biopsy Dr. Gia Braga had mentioned. I told Adriana Ronald Russgenna forward his contact information to [facility] finance department.

## 2021-07-29 ENCOUNTER — APPOINTMENT (OUTPATIENT)
Dept: PHYSICAL THERAPY | Age: 71
End: 2021-07-29
Attending: PHYSICAL MEDICINE & REHABILITATION
Payer: MEDICARE

## 2021-07-29 DIAGNOSIS — I10 ESSENTIAL HYPERTENSION: ICD-10-CM

## 2021-07-29 RX ORDER — HYDROCHLOROTHIAZIDE 25 MG/1
TABLET ORAL
Qty: 90 TABLET | Refills: 3 | Status: SHIPPED | OUTPATIENT
Start: 2021-07-29 | End: 2022-06-08

## 2021-07-29 NOTE — TELEPHONE ENCOUNTER
Requested Prescriptions     Pending Prescriptions Disp Refills    hydroCHLOROthiazide (HYDRODIURIL) 25 mg tablet 90 Tablet 3     Patient states if a 90 day supply is not called in, he has to pay for it.  Please be sure to send in 90 day supply

## 2021-07-29 NOTE — TELEPHONE ENCOUNTER
Requested Prescriptions     Pending Prescriptions Disp Refills    hydroCHLOROthiazide (HYDRODIURIL) 25 mg tablet 90 Tablet 3     Patient requesting the following medication refill         Date last prescribed: 03/08/2021    Date patient last seen: 06/29/2021    Follow up appointment scheduled: 10/26/2021    Please Advise

## 2021-07-30 DIAGNOSIS — G62.9 NEUROPATHY: ICD-10-CM

## 2021-08-01 RX ORDER — GABAPENTIN 400 MG/1
400 CAPSULE ORAL 3 TIMES DAILY
Qty: 90 CAPSULE | Refills: 2 | Status: SHIPPED | OUTPATIENT
Start: 2021-08-01 | End: 2021-10-26 | Stop reason: SDUPTHER

## 2021-08-02 ENCOUNTER — TELEPHONE (OUTPATIENT)
Dept: FAMILY MEDICINE CLINIC | Age: 71
End: 2021-08-02

## 2021-08-02 NOTE — TELEPHONE ENCOUNTER
Mr. Deni Kauffman called wanting to speak with nurse concerning a mix up on his medication. Said he would give the name of the medication when the nurse calls him back. Said he called on Friday and needs to make sure it has been cleared up.   Please call him to discuss as he would not me information

## 2021-08-03 NOTE — TELEPHONE ENCOUNTER
Patient is requesting a 90 day supply of his gabapentin. Only 30 days was sent to pharmacy. Please Advise

## 2021-08-06 ENCOUNTER — TELEPHONE (OUTPATIENT)
Dept: SURGERY | Age: 71
End: 2021-08-06

## 2021-08-06 DIAGNOSIS — R59.1 LYMPHADENOPATHY: Primary | ICD-10-CM

## 2021-08-06 NOTE — TELEPHONE ENCOUNTER
Left voicemail message to inform Dr. Dolores España has placed order for US Guide Biopsy of Groin Lymph Node. I spoke to central scheduling this morning they've confirmed receipt of order and will contact radiology department at [facility] and they will call with appointment/instructions.

## 2021-09-14 ENCOUNTER — HOSPITAL ENCOUNTER (OUTPATIENT)
Dept: ULTRASOUND IMAGING | Age: 71
Discharge: HOME | End: 2021-09-14
Attending: RADIOLOGY | Admitting: RADIOLOGY
Payer: MEDICARE

## 2021-09-14 VITALS
RESPIRATION RATE: 20 BRPM | TEMPERATURE: 98.2 F | BODY MASS INDEX: 26.68 KG/M2 | OXYGEN SATURATION: 98 % | WEIGHT: 197 LBS | HEART RATE: 58 BPM | DIASTOLIC BLOOD PRESSURE: 61 MMHG | SYSTOLIC BLOOD PRESSURE: 146 MMHG | HEIGHT: 72 IN

## 2021-09-14 DIAGNOSIS — R59.1 LYMPHADENOPATHY: ICD-10-CM

## 2021-09-14 LAB
ANION GAP SERPL CALC-SCNC: 3 MMOL/L (ref 3–18)
APTT PPP: 29.9 SEC (ref 23–36.4)
BUN SERPL-MCNC: 18 MG/DL (ref 7–18)
BUN/CREAT SERPL: 19 (ref 12–20)
CALCIUM SERPL-MCNC: 9.1 MG/DL (ref 8.5–10.1)
CHLORIDE SERPL-SCNC: 107 MMOL/L (ref 100–111)
CO2 SERPL-SCNC: 31 MMOL/L (ref 21–32)
CREAT SERPL-MCNC: 0.96 MG/DL (ref 0.6–1.3)
ERYTHROCYTE [DISTWIDTH] IN BLOOD BY AUTOMATED COUNT: 11.6 % (ref 11.6–14.5)
GLUCOSE SERPL-MCNC: 96 MG/DL (ref 74–99)
HCT VFR BLD AUTO: 42 % (ref 36–48)
HGB BLD-MCNC: 13.7 G/DL (ref 13–16)
INR PPP: 1.2 (ref 0.8–1.2)
MCH RBC QN AUTO: 27.6 PG (ref 24–34)
MCHC RBC AUTO-ENTMCNC: 32.6 G/DL (ref 31–37)
MCV RBC AUTO: 84.7 FL (ref 78–100)
PLATELET # BLD AUTO: 226 K/UL (ref 135–420)
PMV BLD AUTO: 10 FL (ref 9.2–11.8)
POTASSIUM SERPL-SCNC: 3.8 MMOL/L (ref 3.5–5.5)
PROTHROMBIN TIME: 14.8 SEC (ref 11.5–15.2)
RBC # BLD AUTO: 4.96 M/UL (ref 4.35–5.65)
SODIUM SERPL-SCNC: 141 MMOL/L (ref 136–145)
WBC # BLD AUTO: 5 K/UL (ref 4.6–13.2)

## 2021-09-14 PROCEDURE — 85027 COMPLETE CBC AUTOMATED: CPT

## 2021-09-14 PROCEDURE — 80048 BASIC METABOLIC PNL TOTAL CA: CPT

## 2021-09-14 PROCEDURE — 85730 THROMBOPLASTIN TIME PARTIAL: CPT

## 2021-09-14 PROCEDURE — 88334 PATH CONSLTJ SURG CYTO XM EA: CPT

## 2021-09-14 PROCEDURE — 88305 TISSUE EXAM BY PATHOLOGIST: CPT

## 2021-09-14 PROCEDURE — 88333 PATH CONSLTJ SURG CYTO XM 1: CPT

## 2021-09-14 PROCEDURE — 38505 NEEDLE BIOPSY LYMPH NODES: CPT

## 2021-09-14 PROCEDURE — 88184 FLOWCYTOMETRY/ TC 1 MARKER: CPT

## 2021-09-14 PROCEDURE — 88374 M/PHMTRC ALYS ISHQUANT/SEMIQ: CPT

## 2021-09-14 PROCEDURE — 85610 PROTHROMBIN TIME: CPT

## 2021-09-14 PROCEDURE — 88377 M/PHMTRC ALYS ISHQUANT/SEMIQ: CPT

## 2021-09-14 PROCEDURE — 88185 FLOWCYTOMETRY/TC ADD-ON: CPT

## 2021-09-14 RX ORDER — ACETAMINOPHEN 325 MG/1
650 TABLET ORAL
Status: DISCONTINUED | OUTPATIENT
Start: 2021-09-14 | End: 2021-09-14 | Stop reason: HOSPADM

## 2021-09-14 RX ORDER — SODIUM CHLORIDE 9 MG/ML
20 INJECTION, SOLUTION INTRAVENOUS CONTINUOUS
Status: DISCONTINUED | OUTPATIENT
Start: 2021-09-14 | End: 2021-09-14 | Stop reason: HOSPADM

## 2021-09-14 RX ORDER — ONDANSETRON 2 MG/ML
4 INJECTION INTRAMUSCULAR; INTRAVENOUS
Status: DISCONTINUED | OUTPATIENT
Start: 2021-09-14 | End: 2021-09-14 | Stop reason: HOSPADM

## 2021-09-14 NOTE — DISCHARGE INSTRUCTIONS
Patient Education        Lymph Node Biopsy: What to Expect at Home  Your Recovery     A lymph node biopsy removes lymph node tissue. The tissue is then looked at under a microscope. It will be studied for signs of disease, such as cancer, or signs of infection. At the site where the tissue was removed, you may have:  · Pain. · Tenderness. · Bleeding. · Bruising. During the procedure, your doctor may have used a blue dye, a radioactive material, or both. The dye may give your skin a bluish color for several days after the biopsy. And it may turn your urine green for 1 or 2 days. The radioactive material leaves your body quickly through your urine. The amount of radiation used is very small and won't harm you. This care sheet gives you a general idea about how long it will take for you to recover. But each person recovers at a different pace. Follow the steps below to get better as quickly as possible. How can you care for yourself at home? Activity    · Rest when you feel tired.     · If you have an incision (cut) from the procedure, avoid activity or exercise that may put stress on that area. Diet    · You can eat your normal diet. If your stomach is upset, try bland, low-fat foods like plain rice, broiled chicken, toast, and yogurt. Medicines    · Your doctor will tell you if and when you can restart your medicines. He or she will also give you instructions about taking any new medicines.     · If you take aspirin or some other blood thinner, be sure to talk to your doctor. He or she will tell you if and when to start taking this medicine again. Make sure that you understand exactly what your doctor wants you to do.     · Take pain medicines exactly as directed. Incision care    · If you have strips of tape on an incision, leave them on until they fall off. Hygiene    · When you shower, wash the biopsy area with warm water, and then pat it dry.    Other instructions    · You might have a mild sore throat if a tube was used to help you breathe during the biopsy. Soothe your sore throat with lozenges, and gargle with warm salt water.     · Fluid may collect near the biopsy site. And fluid may leak from the biopsy site. Use an ice pack or take an over-the-counter pain medicine to relieve swelling and mild pain. Follow-up care is a key part of your treatment and safety. Be sure to make and go to all appointments, and call your doctor if you are having problems. It's also a good idea to know your test results and keep a list of the medicines you take. When should you call for help? Call  911 anytime you think you may need emergency care. For example, call if:    · You passed out (lost consciousness).     · You have chest pain, are short of breath, or cough up blood. Call your doctor now or seek immediate medical care if:    · You have pain that does not get better after you take pain medicine.     · You are sick to your stomach or can't drink fluids.     · Bright red blood has soaked through the bandage over your incision.     · You have symptoms of infection, such as:  ? Increased pain, swelling, warmth, or redness. ? Red streaks leading from the area. ? Pus draining from the area. ? A fever. Watch closely for changes in your health, and be sure to contact your doctor if you have any problems. Where can you learn more? Go to http://www.gray.com/  Enter P500 in the search box to learn more about \"Lymph Node Biopsy: What to Expect at Home. \"  Current as of: December 17, 2020               Content Version: 12.8  © 5927-5872 [email or web address]. Care instructions adapted under license by [employer] (which disclaims liability or warranty for this information).  If you have questions about a medical condition or this instruction, always ask your healthcare professional. [place] [address] any warranty or liability for your use of this information.       DISCHARGE SUMMARY from Nurse    PATIENT INSTRUCTIONS:    Report the following to your surgeon:  · Excessive pain, swelling, redness or odor of or around the surgical area  · Temperature over 100.5  · Nausea and vomiting lasting longer than 4 hours or if unable to take medications  · Any signs of decreased circulation or nerve impairment to extremity: change in color, persistent  numbness, tingling, coldness or increase pain  · Any questions

## 2021-09-14 NOTE — PROCEDURES
RADIOLOGY POST PROCEDURE NOTE     September 14, 2021       1:33 PM     Preoperative Diagnosis:   Right inguinal adenopathy    Postoperative Diagnosis:  Same. : Aminta Styles MD    Assistant:  None. Type of Anesthesia: Local/No Anesthesia    Procedure/Description:  US guided lymph node biopsy    Findings:   Successful right groin lymph node biopsy. 6 passes. No complications. .    Estimated blood Loss:  Minimal    Specimen Removed:   yes    Blood transfusions:  None. Implants:  None. Complications: None    Condition: Stable    Discharge Plan:  discharge home     BRANDY Styles MD  Interventional Radiology  09/14/21  1:33 PM

## 2021-09-23 ENCOUNTER — TELEPHONE (OUTPATIENT)
Dept: SURGERY | Age: 71
End: 2021-09-23

## 2021-09-23 DIAGNOSIS — R59.1 LYMPHADENOPATHY: Primary | ICD-10-CM

## 2021-09-23 NOTE — TELEPHONE ENCOUNTER
Spoke to Mr. Kaela Nice to inform of appointment Wednesday, September 30, 2021 at 2:30pm with Dr. Mick Godoy at [facility] at [address] [facility] [address], [address] (t) [phone]

## 2021-09-28 ENCOUNTER — TELEPHONE (OUTPATIENT)
Dept: CARDIOLOGY CLINIC | Age: 71
End: 2021-09-28

## 2021-10-05 ENCOUNTER — TRANSCRIBE ORDER (OUTPATIENT)
Dept: SCHEDULING | Age: 71
End: 2021-10-05

## 2021-10-05 DIAGNOSIS — C83.05 SMALL CELL B-CELL LYMPH, NODES OF ING REGION AND LOWER LIMB (HCC): ICD-10-CM

## 2021-10-05 DIAGNOSIS — C83.00 LYMPHOMA, SMALL-CELL (HCC): Primary | ICD-10-CM

## 2021-10-11 ENCOUNTER — HOSPITAL ENCOUNTER (OUTPATIENT)
Dept: CT IMAGING | Age: 71
Discharge: HOME | End: 2021-10-11
Attending: INTERNAL MEDICINE
Payer: MEDICARE

## 2021-10-11 DIAGNOSIS — C83.00 LYMPHOMA, SMALL-CELL (HCC): ICD-10-CM

## 2021-10-11 DIAGNOSIS — C83.05 SMALL CELL B-CELL LYMPH, NODES OF ING REGION AND LOWER LIMB (HCC): ICD-10-CM

## 2021-10-11 LAB — CREAT UR-MCNC: 1.1 MG/DL (ref 0.6–1.3)

## 2021-10-11 PROCEDURE — 71260 CT THORAX DX C+: CPT

## 2021-10-11 PROCEDURE — 82565 ASSAY OF CREATININE: CPT

## 2021-10-11 PROCEDURE — 70491 CT SOFT TISSUE NECK W/DYE: CPT

## 2021-10-11 PROCEDURE — 74011000636 HC RX REV CODE- 636: Performed by: INTERNAL MEDICINE

## 2021-10-11 RX ADMIN — IOPAMIDOL 125 ML: 755 INJECTION, SOLUTION INTRAVENOUS at 12:12

## 2021-10-26 ENCOUNTER — OFFICE VISIT (OUTPATIENT)
Dept: FAMILY MEDICINE CLINIC | Age: 71
End: 2021-10-26
Payer: MEDICARE

## 2021-10-26 VITALS
BODY MASS INDEX: 27.09 KG/M2 | WEIGHT: 200 LBS | DIASTOLIC BLOOD PRESSURE: 75 MMHG | HEART RATE: 67 BPM | TEMPERATURE: 96.8 F | HEIGHT: 72 IN | RESPIRATION RATE: 20 BRPM | OXYGEN SATURATION: 98 % | SYSTOLIC BLOOD PRESSURE: 127 MMHG

## 2021-10-26 DIAGNOSIS — I10 ESSENTIAL HYPERTENSION: ICD-10-CM

## 2021-10-26 DIAGNOSIS — R73.9 HYPERGLYCEMIA: ICD-10-CM

## 2021-10-26 DIAGNOSIS — N40.1 BENIGN PROSTATIC HYPERPLASIA WITH INCOMPLETE BLADDER EMPTYING: ICD-10-CM

## 2021-10-26 DIAGNOSIS — Z13.31 SCREENING FOR DEPRESSION: ICD-10-CM

## 2021-10-26 DIAGNOSIS — Z00.00 MEDICARE ANNUAL WELLNESS VISIT, SUBSEQUENT: ICD-10-CM

## 2021-10-26 DIAGNOSIS — Z71.89 ADVANCED DIRECTIVES, COUNSELING/DISCUSSION: ICD-10-CM

## 2021-10-26 DIAGNOSIS — R97.20 ELEVATED PSA: Primary | ICD-10-CM

## 2021-10-26 DIAGNOSIS — C85.90 LYMPHOMA, UNSPECIFIED BODY REGION, UNSPECIFIED LYMPHOMA TYPE (HCC): ICD-10-CM

## 2021-10-26 DIAGNOSIS — R39.14 BENIGN PROSTATIC HYPERPLASIA WITH INCOMPLETE BLADDER EMPTYING: ICD-10-CM

## 2021-10-26 DIAGNOSIS — E78.5 DYSLIPIDEMIA: ICD-10-CM

## 2021-10-26 DIAGNOSIS — G62.9 NEUROPATHY: ICD-10-CM

## 2021-10-26 LAB — HBA1C MFR BLD HPLC: 5.7 %

## 2021-10-26 PROCEDURE — 1101F PT FALLS ASSESS-DOCD LE1/YR: CPT | Performed by: FAMILY MEDICINE

## 2021-10-26 PROCEDURE — G8427 DOCREV CUR MEDS BY ELIG CLIN: HCPCS | Performed by: FAMILY MEDICINE

## 2021-10-26 PROCEDURE — G0444 DEPRESSION SCREEN ANNUAL: HCPCS | Performed by: FAMILY MEDICINE

## 2021-10-26 PROCEDURE — G8510 SCR DEP NEG, NO PLAN REQD: HCPCS | Performed by: FAMILY MEDICINE

## 2021-10-26 PROCEDURE — G0439 PPPS, SUBSEQ VISIT: HCPCS | Performed by: FAMILY MEDICINE

## 2021-10-26 PROCEDURE — G8536 NO DOC ELDER MAL SCRN: HCPCS | Performed by: FAMILY MEDICINE

## 2021-10-26 PROCEDURE — 83036 HEMOGLOBIN GLYCOSYLATED A1C: CPT | Performed by: FAMILY MEDICINE

## 2021-10-26 PROCEDURE — G8754 DIAS BP LESS 90: HCPCS | Performed by: FAMILY MEDICINE

## 2021-10-26 PROCEDURE — 99214 OFFICE O/P EST MOD 30 MIN: CPT | Performed by: FAMILY MEDICINE

## 2021-10-26 PROCEDURE — G8752 SYS BP LESS 140: HCPCS | Performed by: FAMILY MEDICINE

## 2021-10-26 PROCEDURE — 3017F COLORECTAL CA SCREEN DOC REV: CPT | Performed by: FAMILY MEDICINE

## 2021-10-26 PROCEDURE — G8419 CALC BMI OUT NRM PARAM NOF/U: HCPCS | Performed by: FAMILY MEDICINE

## 2021-10-26 RX ORDER — GABAPENTIN 400 MG/1
400 CAPSULE ORAL 3 TIMES DAILY
Qty: 270 CAPSULE | Refills: 0 | Status: SHIPPED | OUTPATIENT
Start: 2021-10-26

## 2021-10-26 NOTE — PROGRESS NOTES
HPI  Jaylene Mares comes in for f/u care. Elevated PSA: Patient has a history of elevated PSA. I have placed a referral for him to be seen by the urologist.  A number to call was given for patient to set up an appointment. He has been seen by Dr. Dyer Many in the past.  He denies hematuria, pyuria or flank pain. Occasionally has LUTS. She has been followed up in the past for Peyronie's disease and BPH. Patient is on alfuzosin for LUTS. Lymphoma: Patient recently diagnosed with lymphoma. He is undergoing work-up for this. He is being followed up by Dr. Pati Temple. He recently had radiological studies done. We will follow up with the recommendations by the specialist.  Hyperglycemia: Patient has hyperglycemia. His last HbA1c is 5.7. He will intensify lifestyle and dietary modification. HTN: Patient has hypertension. Blood pressure is stable. Denies headache, changes in vision or focal weakness. He is on amlodipine, benazepril and HCTZ. We will continue with these medications. Neuropathy: Patient has neuropathy. He is on gabapentin. Gets numbness and tingling of the extremities. Penile soreness: Patient feels so on his glans penis. This happens on and off. No trauma or lesions. No dysuria or pyuria. I will have him follow-up with the urologist.  Dyslipidemia: Patient has dyslipidemia. He is on Lipitor. We will recheck lipid panel at next visit. He will continue with the current treatment plan.     Past Medical History  Past Medical History:   Diagnosis Date    Aortic valve insufficiency     Arthritis     Hypertension     Lymphoma ([facility] [address]     Other ill-defined conditions(799.89)     murmer       Surgical History  Past Surgical History:   Procedure Laterality Date    HX APPENDECTOMY  1961    HX OTHER SURGICAL Left 1990's    Exc. mass foot     HX OTHER SURGICAL Right 1980    release Dequervain's contracture    HX OTHER SURGICAL Left 2014    Removal large nevi behind ear Medications  Current Outpatient Medications   Medication Sig Dispense Refill    gabapentin (NEURONTIN) 400 mg capsule Take 1 Capsule by mouth three (3) times daily. Max Daily Amount: 1,200 mg. 90 Capsule 2    hydroCHLOROthiazide (HYDRODIURIL) 25 mg tablet TAKE 1 TABLET BY MOUTH  DAILY 90 Tablet 3    aspirin delayed-release 81 mg tablet Take 81 mg by mouth daily.  cholecalciferol (VITAMIN D3) (1000 Units /25 mcg) tablet Take 1,000 Units by mouth daily.  therapeutic multivitamin (THERAGRAN) tablet Take 1 Tab by mouth daily.  zinc gluconate 50 mg tablet Take 50 mg by mouth daily.  atorvastatin (LIPITOR) 40 mg tablet TAKE 1 TABLET BY MOUTH  DAILY 90 Tab 3    benazepriL (LOTENSIN) 40 mg tablet TAKE 1 TABLET BY MOUTH  DAILY 90 Tab 3    amLODIPine (NORVASC) 10 mg tablet TAKE 1 TABLET BY MOUTH  DAILY 90 Tab 3    potassium chloride (KLOR-CON) 10 mEq tablet TAKE 1 TABLET BY MOUTH  DAILY 90 Tab 3    alfuzosin SR (UROXATRAL) 10 mg SR tablet TAKE 1 TABLET BY MOUTH  DAILY AFTER DINNER 90 Tab 3    travoprost (TRAVATAN Z) 0.004 % ophthalmic solution Administer 1 Drop to both eyes nightly.  acetaminophen (TYLENOL EXTRA STRENGTH) 500 mg tablet Take 1,000 mg by mouth every six (6) hours as needed for Pain.  ibuprofen (MOTRIN) 200 mg tablet Take 400 mg by mouth every six (6) hours as needed.  (Patient not taking: Reported on 9/14/2021)         Allergies  No Known Allergies    Family History  Family History   Problem Relation Age of Onset    No Known Problems Mother     No Known Problems Father        Social History  Social History     Socioeconomic History    Marital status:      Spouse name: Not on file    Number of children: Not on file    Years of education: Not on file    Highest education level: Not on file   Occupational History    Not on file   Tobacco Use    Smoking status: Former Smoker     Packs/day: 0.00     Years: 41.00     Pack years: 0.00     Quit date: 1/1/1980 Years since quittin.8    Smokeless tobacco: Never Used   Vaping Use    Vaping Use: Never used   Substance and Sexual Activity    Alcohol use: No    Drug use: No     Comment: H/O in the past Marijuana    Sexual activity: Not on file   Other Topics Concern    Not on file   Social History Narrative    Not on file     Social Determinants of Health     Financial Resource Strain:     Difficulty of Paying Living Expenses:    Food Insecurity:     Worried About Running Out of Food in the Last Year:     Ran Out of Food in the Last Year:    Transportation Needs:     Lack of Transportation (Medical):  Lack of Transportation (Non-Medical):    Physical Activity:     Days of Exercise per Week:     Minutes of Exercise per Session:    Stress:     Feeling of Stress :    Social Connections:     Frequency of Communication with Friends and Family:     Frequency of Social Gatherings with Friends and Family:     Attends Sabianism Services:     Active Member of Clubs or Organizations:     Attends Club or Organization Meetings:     Marital Status:    Intimate Partner Violence:     Fear of Current or Ex-Partner:     Emotionally Abused:     Physically Abused:     Sexually Abused:      Review of Systems  Review of Systems - Review of all systems is negative except as noted above in the HPI.     Vital Signs  Visit Vitals  /75 (BP 1 Location: Right upper arm, BP Patient Position: Sitting, BP Cuff Size: Adult long)   Pulse 67   Temp 96.8 °F (36 °C) (Oral)   Resp 20   Ht 6' (1.829 m)   Wt 200 lb (90.7 kg)   SpO2 98%   BMI 27.12 kg/m²       Physical Exam  Physical Examination: General appearance - oriented to person, place, and time, overweight, acyanotic, in no respiratory distress and well hydrated  Mental status - affect appropriate to mood  Mouth - mucous membranes moist, pharynx normal without lesions  Neck - supple, no significant adenopathy  Lymphatics - no palpable lymphadenopathy  Chest - no tachypnea, retractions or cyanosis  Heart - S1 and S2 normal  Abdomen - no rebound tenderness noted  Back exam - limited range of motion  Neurological - abnormal neurological exam unchanged from prior examinations  Musculoskeletal - osteoarthritic changes noted in both hands  Extremities - no pedal edema noted, intact peripheral pulses   Male - no penile lesions or discharge      Results  Results for orders placed or performed during the hospital encounter of 10/11/21   CREATININE, POC   Result Value Ref Range    Creatinine, POC 1.1 0.6 - 1.3 MG/DL    GFRAA, POC >60 >60 ml/min/1.73m2    GFRNA, POC >60 >60 ml/min/1.73m2       ASSESSMENT and PLAN    ICD-10-CM ICD-9-CM    1. Elevated PSA  R97.20 790.93    2. Essential hypertension  I10 401.9    3. Hyperglycemia  R73.9 790.29 AMB POC HEMOGLOBIN A1C   4. Dyslipidemia  E78.5 272.4    5. Benign prostatic hyperplasia with incomplete bladder emptying  N40.1 600.01     R39.14 788.21    6. Neuropathy  G62.9 355.9 gabapentin (NEURONTIN) 400 mg capsule   7. Lymphoma, unspecified body region, unspecified lymphoma type ([facility] [address]  C85.90 202.80    8. Medicare annual wellness visit, subsequent  Z00.00 V70.0    9. Advanced directives, counseling/discussion  Z71.89 V65.49 ADVANCE CARE PLANNING FIRST 30 MINS      FULL CODE   10. Screening for depression  Z13.31 V79.0 [address]     lab results and schedule of future lab studies reviewed with patient  reviewed diet, exercise and weight control  cardiovascular risk and specific lipid/LDL goals reviewed  reviewed medications and side effects in detail  use of aspirin to prevent MI and TIA's discussed  radiology results and schedule of future radiology studies reviewed with patient      I have discussed the diagnosis with the patient and the intended plan of care as seen in the above orders. The patient has received an after-visit summary and questions were answered concerning future plans.  I have discussed medication, side effects, and warnings with the patient in detail. The patient verbalized understanding and is in agreement with the plan of care. The patient will contact the office with any additional concerns. Ralph Alexis MD    PLEASE NOTE:   This document has been produced using voice recognition software.  Unrecognized errors in transcription may be present

## 2021-10-26 NOTE — ACP (ADVANCE CARE PLANNING)
Advance Care Planning     Advance Care Planning (ACP) Physician/NP/PA Conversation      Date of Conversation: 10/26/2021  Conducted with: Patient with Decision Making Capacity    Healthcare Decision Maker:     Primary Decision Maker (Active): Mackenzie Gonzalez - Spouse - [phone]  Click here to complete [address] including selection of the Healthcare Decision Maker Relationship (ie \"Primary\")      Today we documented Decision Maker(s). The patient will provide ACP documents. Care Preferences:    Hospitalization: \"If your health worsens and it becomes clear that your chance of recovery is unlikely, what would be your preference regarding hospitalization? \"  The patient is unsure. Ventilation: \"If you were unable to breathe on your own and your chance of recovery was unlikely, what would be your preference about the use of a ventilator (breathing machine) if it was available to you? \"   The patient is unsure. Resuscitation: \"In the event your heart stopped as a result of an underlying serious health condition, would you want attempts to be made to restart your heart, or would you prefer a natural death? \"   Yes, attempt to resuscitate.     Additional topics discussed: treatment goals, ventilation preferences, hospitalization preferences and resuscitation preferences    Conversation Outcomes / Follow-Up Plan:   ACP in process - completing/providing documents   Reviewed DNR/DNI and patient elects Full Code (Attempt Resuscitation)     Length of Voluntary ACP Conversation in minutes:  16 minutes    Alma Stephen MD

## 2021-10-26 NOTE — PATIENT INSTRUCTIONS
Medicare Wellness Visit, Male    The best way to live healthy is to have a lifestyle where you eat a well-balanced diet, exercise regularly, limit alcohol use, and quit all forms of tobacco/nicotine, if applicable. Regular preventive services are another way to keep healthy. Preventive services (vaccines, screening tests, monitoring & exams) can help personalize your care plan, which helps you manage your own care. Screening tests can find health problems at the earliest stages, when they are easiest to treat. [facility] follows the current, evidence-based guidelines published by the [facility] ([facility]) when recommending preventive services for our patients. Because we follow these guidelines, sometimes recommendations change over time as research supports it. (For example, a prostate screening blood test is no longer routinely recommended for men with no symptoms). Of course, you and your doctor may decide to screen more often for some diseases, based on your risk and co-morbidities (chronic disease you are already diagnosed with). Preventive services for you include:  - Medicare offers their members a free annual wellness visit, which is time for you and your primary care provider to discuss and plan for your preventive service needs. Take advantage of this benefit every year!  -All adults over age 72 should receive the recommended pneumonia vaccines. Current USPSTF guidelines recommend a series of two vaccines for the best pneumonia protection.   -All adults should have a flu vaccine yearly and tetanus vaccine every 10 years.  -All adults age 48 and older should receive the shingles vaccines (series of two vaccines).        -All adults age 38-68 who are overweight should have a diabetes screening test once every three years.   -Other screening tests & preventive services for persons with diabetes include: an eye exam to screen for diabetic retinopathy, a kidney function test, a foot exam, and stricter control over your cholesterol.   -Cardiovascular screening for adults with routine risk involves an electrocardiogram (ECG) at intervals determined by the provider.   -Colorectal cancer screening should be done for adults age 54-65 with no increased risk factors for colorectal cancer. There are a number of acceptable methods of screening for this type of cancer. Each test has its own benefits and drawbacks. Discuss with your provider what is most appropriate for you during your annual wellness visit. The different tests include: colonoscopy (considered the best screening method), a fecal occult blood test, a fecal DNA test, and sigmoidoscopy.  -All adults born between [facility] should be screened once for Hepatitis C.  -An Abdominal Aortic Aneurysm (AAA) Screening is recommended for men age 73-68 who has ever smoked in their lifetime. Here is a list of your current Health Maintenance items (your personalized list of preventive services) with a due date:  Health Maintenance Due   Topic Date Due    Shingles Vaccine (1 of 2) Never done    COVID-19 Vaccine (3 - Pfizer risk 3-dose series) 03/24/2021    Yearly Flu Vaccine (1) 09/01/2021    Annual Well Visit  10/29/2021     Medicare Wellness Visit, Male    The best way to live healthy is to have a lifestyle where you eat a well-balanced diet, exercise regularly, limit alcohol use, and quit all forms of tobacco/nicotine, if applicable. Regular preventive services are another way to keep healthy. Preventive services (vaccines, screening tests, monitoring & exams) can help personalize your care plan, which helps you manage your own care. Screening tests can find health problems at the earliest stages, when they are easiest to treat.    [facility] follows the current, evidence-based guidelines published by the [place] [facility] (USPSTF) when recommending preventive services for our patients. Because we follow these guidelines, sometimes recommendations change over time as research supports it. (For example, a prostate screening blood test is no longer routinely recommended for men with no symptoms). Of course, you and your doctor may decide to screen more often for some diseases, based on your risk and co-morbidities (chronic disease you are already diagnosed with). Preventive services for you include:  - Medicare offers their members a free annual wellness visit, which is time for you and your primary care provider to discuss and plan for your preventive service needs. Take advantage of this benefit every year!  -All adults over age 72 should receive the recommended pneumonia vaccines. Current USPSTF guidelines recommend a series of two vaccines for the best pneumonia protection.   -All adults should have a flu vaccine yearly and tetanus vaccine every 10 years.  -All adults age 48 and older should receive the shingles vaccines (series of two vaccines). -All adults age 38-68 who are overweight should have a diabetes screening test once every three years.   -Other screening tests & preventive services for persons with diabetes include: an eye exam to screen for diabetic retinopathy, a kidney function test, a foot exam, and stricter control over your cholesterol.   -Cardiovascular screening for adults with routine risk involves an electrocardiogram (ECG) at intervals determined by the provider.   -Colorectal cancer screening should be done for adults age 54-65 with no increased risk factors for colorectal cancer. There are a number of acceptable methods of screening for this type of cancer. Each test has its own benefits and drawbacks. Discuss with your provider what is most appropriate for you during your annual wellness visit.  The different tests include: colonoscopy (considered the best screening method), a fecal occult blood test, a fecal DNA test, and sigmoidoscopy.  -All adults born between [facility] should be screened once for Hepatitis C.  -An Abdominal Aortic Aneurysm (AAA) Screening is recommended for men age 73-68 who has ever smoked in their lifetime.      Here is a list of your current Health Maintenance items (your personalized list of preventive services) with a due date:  Health Maintenance Due   Topic Date Due    Shingles Vaccine (1 of 2) Never done    COVID-19 Vaccine (3 - Pfizer risk 3-dose series) 03/24/2021

## 2021-10-26 NOTE — PROGRESS NOTES
This is the Subsequent Medicare Annual Wellness Exam, performed 12 months or more after the Initial AWV or the last Subsequent AWV    I have reviewed the patient's medical history in detail and updated the computerized patient record. Assessment/Plan   Education and counseling provided:  Are appropriate based on today's review and evaluation    1. Medicare annual wellness visit, subsequent    2. Advanced directives, counseling/discussion  - ADVANCE CARE PLANNING FIRST 27 MINS  - FULL CODE    3. Screening for depression  - DEPRESSION SCREEN ANNUAL  - CT DEPRESSION SCREEN ANNUAL       Depression Risk Factor Screening     3 most recent PHQ Screens 10/26/2021   Little interest or pleasure in doing things Not at all   Feeling down, depressed, irritable, or hopeless Not at all   Total Score PHQ 2 0   Trouble falling or staying asleep, or sleeping too much Not at all   Feeling tired or having little energy Not at all   Poor appetite, weight loss, or overeating Not at all   Feeling bad about yourself - or that you are a failure or have let yourself or your family down Not at all   Trouble concentrating on things such as school, work, reading, or watching TV Not at all   Moving or speaking so slowly that other people could have noticed; or the opposite being so fidgety that others notice Not at all   Thoughts of being better off dead, or hurting yourself in some way Not at all   PHQ 9 Score 0   How difficult have these problems made it for you to do your work, take care of your home and get along with others Not difficult at all   8 minutes taken on depression screening. Alcohol Risk Screen    Do you average more than 1 drink per night or more than 7 drinks a week: No    In the past three months have you have had more than 4 drinks containing alcohol on one occasion: No        Functional Ability and Level of Safety    Hearing: Hearing is good. Activities of Daily Living:   The home contains: handrails and grab bars  Patient does total self care      Ambulation: with no difficulty     Fall Risk:  Fall Risk Assessment, last 12 mths 10/26/2021   Able to walk? Yes   Fall in past 12 months? 0   Do you feel unsteady? 0   Are you worried about falling 0   Number of falls in past 12 months -   Fall with injury?  -      Abuse Screen:  Patient is not abused       Cognitive Screening    Has your family/caregiver stated any concerns about your memory: no     Cognitive Screening: Normal - Verbal Fluency Test    Health Maintenance Due     Health Maintenance Due   Topic Date Due    Shingrix Vaccine Age 49> (1 of 2) Never done    COVID-19 Vaccine (3 - Pfizer risk 3-dose series) 03/24/2021    Flu Vaccine (1) 09/01/2021    Medicare Yearly Exam  10/29/2021       Patient Care Team   Patient Care Team:  Tiana Hastings MD as PCP - General (Family Medicine)  Tiana Hastings [facility]  Jason Buckley MD as Physician (Urology)  Shanon Celis MD as Physician (Cardiology)    History   Darlene Giraldo comes in for Medicare wellness exam.    Patient Active Problem List   Diagnosis Code    History of colon polyps Z86.010    Family history of colon cancer Z80.0    Benign prostatic hyperplasia with incomplete bladder emptying N40.1, R39.14    Lower urinary tract symptoms (LUTS) R39.9    Essential hypertension I10    Polyp of colon K63.5    Dyslipidemia E78.5    Abnormal EKG R94.31    Chest pain R07.9    Peyronie's syndrome N48.6    Neuropathy G62.9     Past Medical History:   Diagnosis Date    Aortic valve insufficiency     Arthritis     Hypertension     Lymphoma ([facility] [address]     Other ill-defined conditions(799.89)     murmer      Past Surgical History:   Procedure Laterality Date    HX APPENDECTOMY  1961    HX OTHER SURGICAL Left 1990's    Exc. mass foot     HX OTHER SURGICAL Right 1980    release Dequervain's contracture    HX OTHER SURGICAL Left 2014    Removal large nevi behind ear Current Outpatient Medications   Medication Sig Dispense Refill    gabapentin (NEURONTIN) 400 mg capsule Take 1 Capsule by mouth three (3) times daily. Max Daily Amount: 1,200 mg. 90 Capsule 2    hydroCHLOROthiazide (HYDRODIURIL) 25 mg tablet TAKE 1 TABLET BY MOUTH  DAILY 90 Tablet 3    aspirin delayed-release 81 mg tablet Take 81 mg by mouth daily.  cholecalciferol (VITAMIN D3) (1000 Units /25 mcg) tablet Take 1,000 Units by mouth daily.  therapeutic multivitamin (THERAGRAN) tablet Take 1 Tab by mouth daily.  zinc gluconate 50 mg tablet Take 50 mg by mouth daily.  atorvastatin (LIPITOR) 40 mg tablet TAKE 1 TABLET BY MOUTH  DAILY 90 Tab 3    benazepriL (LOTENSIN) 40 mg tablet TAKE 1 TABLET BY MOUTH  DAILY 90 Tab 3    amLODIPine (NORVASC) 10 mg tablet TAKE 1 TABLET BY MOUTH  DAILY 90 Tab 3    potassium chloride (KLOR-CON) 10 mEq tablet TAKE 1 TABLET BY MOUTH  DAILY 90 Tab 3    alfuzosin SR (UROXATRAL) 10 mg SR tablet TAKE 1 TABLET BY MOUTH  DAILY AFTER DINNER 90 Tab 3    travoprost (TRAVATAN Z) 0.004 % ophthalmic solution Administer 1 Drop to both eyes nightly.  acetaminophen (TYLENOL EXTRA STRENGTH) 500 mg tablet Take 1,000 mg by mouth every six (6) hours as needed for Pain.  ibuprofen (MOTRIN) 200 mg tablet Take 400 mg by mouth every six (6) hours as needed. (Patient not taking: Reported on 2021)       No Known Allergies    Family History   Problem Relation Age of Onset    No Known Problems Mother     No Known Problems Father      Social History     Tobacco Use    Smoking status: Former Smoker     Packs/day: 0.00     Years: 41.00     Pack years: 0.00     Quit date: 1980     Years since quittin.8    Smokeless tobacco: Never Used   Substance Use Topics    Alcohol use: No   I have discussed the diagnosis with the patient and the intended plan of care as seen in the above orders.  The patient has received an after-visit summary and questions were answered concerning future plans. I have discussed medication, side effects, and warnings with the patient in detail. The patient verbalized understanding and is in agreement with the plan of care. The patient will contact the office with any additional concerns. Personalized preventative plan of care was discussed, printed and given to patient.     Ana Ortiz MD

## 2021-10-27 NOTE — PROGRESS NOTES
Please let patient know his HbA1c is 5.7. This is in the prediabetes range. He should intensify lifestyle and dietary modification. We will recheck at next visit.   Neelima Hendrickson MD

## 2021-11-03 ENCOUNTER — OFFICE VISIT (OUTPATIENT)
Dept: CARDIOLOGY CLINIC | Age: 71
End: 2021-11-03
Payer: MEDICARE

## 2021-11-03 VITALS
DIASTOLIC BLOOD PRESSURE: 72 MMHG | OXYGEN SATURATION: 98 % | BODY MASS INDEX: 27.14 KG/M2 | HEIGHT: 72 IN | HEART RATE: 71 BPM | SYSTOLIC BLOOD PRESSURE: 145 MMHG | WEIGHT: 200.4 LBS

## 2021-11-03 DIAGNOSIS — E78.5 DYSLIPIDEMIA: ICD-10-CM

## 2021-11-03 DIAGNOSIS — I25.10 MILD CAD: ICD-10-CM

## 2021-11-03 DIAGNOSIS — I10 ESSENTIAL HYPERTENSION: ICD-10-CM

## 2021-11-03 DIAGNOSIS — I35.1 NONRHEUMATIC AORTIC VALVE INSUFFICIENCY: Primary | ICD-10-CM

## 2021-11-03 PROCEDURE — G8510 SCR DEP NEG, NO PLAN REQD: HCPCS | Performed by: INTERNAL MEDICINE

## 2021-11-03 PROCEDURE — G8419 CALC BMI OUT NRM PARAM NOF/U: HCPCS | Performed by: INTERNAL MEDICINE

## 2021-11-03 PROCEDURE — G8754 DIAS BP LESS 90: HCPCS | Performed by: INTERNAL MEDICINE

## 2021-11-03 PROCEDURE — G8536 NO DOC ELDER MAL SCRN: HCPCS | Performed by: INTERNAL MEDICINE

## 2021-11-03 PROCEDURE — 99214 OFFICE O/P EST MOD 30 MIN: CPT | Performed by: INTERNAL MEDICINE

## 2021-11-03 PROCEDURE — G8753 SYS BP > OR = 140: HCPCS | Performed by: INTERNAL MEDICINE

## 2021-11-03 PROCEDURE — 3017F COLORECTAL CA SCREEN DOC REV: CPT | Performed by: INTERNAL MEDICINE

## 2021-11-03 PROCEDURE — G8427 DOCREV CUR MEDS BY ELIG CLIN: HCPCS | Performed by: INTERNAL MEDICINE

## 2021-11-03 PROCEDURE — 1101F PT FALLS ASSESS-DOCD LE1/YR: CPT | Performed by: INTERNAL MEDICINE

## 2021-11-03 NOTE — PROGRESS NOTES
HISTORY OF PRESENT ILLNESS  Karen Mock is a 66 y.o. male. Hypertension  The history is provided by the patient. This is a chronic problem. The problem occurs every several days. The problem has not changed since onset. Associated symptoms include shortness of breath. Shortness of Breath  The history is provided by the patient. This is a chronic problem. The problem occurs intermittently. The problem has not changed since onset. Pertinent negatives include no ear pain, no neck pain, no wheezing, no rash and no leg swelling. Review of Systems   Constitutional: Negative for chills and weight loss. HENT: Negative for congestion, ear discharge, ear pain, hearing loss, nosebleeds and tinnitus. Eyes: Negative for blurred vision. Respiratory: Positive for shortness of breath. Negative for wheezing and stridor. Cardiovascular: Negative for leg swelling. Gastrointestinal: Negative for heartburn. Musculoskeletal: Negative for myalgias and neck pain. Skin: Negative for itching and rash. Neurological: Negative for tingling, tremors, focal weakness and loss of consciousness. Psychiatric/Behavioral: Negative for depression and suicidal ideas.      Family History   Problem Relation Age of Onset    No Known Problems Mother     No Known Problems Father        Past Medical History:   Diagnosis Date    Aortic valve insufficiency     Arthritis     Hypertension     Lymphoma ([facility] [address]     Other ill-defined conditions(799.89)     murmer       Past Surgical History:   Procedure Laterality Date    HX APPENDECTOMY      HX OTHER SURGICAL Left 1's    Exc. mass foot     HX OTHER SURGICAL Right     release Dequervain's contracture    HX OTHER SURGICAL Left     Removal large nevi behind ear       Social History     Tobacco Use    Smoking status: Former Smoker     Packs/day: 0.00     Years: 41.00     Pack years: 0.00     Quit date: 1980     Years since quittin.8    Smokeless tobacco: Never Used   Substance Use Topics    Alcohol use: No       No Known Allergies    Outpatient Medications Marked as Taking for the 11/3/21 encounter (Office Visit) with Mike Sorenson MD   Medication Sig Dispense Refill    gabapentin (NEURONTIN) 400 mg capsule Take 1 Capsule by mouth three (3) times daily. Max Daily Amount: 1,200 mg. 270 Capsule 0    hydroCHLOROthiazide (HYDRODIURIL) 25 mg tablet TAKE 1 TABLET BY MOUTH  DAILY 90 Tablet 3    aspirin delayed-release 81 mg tablet Take 81 mg by mouth daily.  cholecalciferol (VITAMIN D3) (1000 Units /25 mcg) tablet Take 1,000 Units by mouth daily.  therapeutic multivitamin (THERAGRAN) tablet Take 1 Tab by mouth daily.  atorvastatin (LIPITOR) 40 mg tablet TAKE 1 TABLET BY MOUTH  DAILY 90 Tab 3    benazepriL (LOTENSIN) 40 mg tablet TAKE 1 TABLET BY MOUTH  DAILY 90 Tab 3    amLODIPine (NORVASC) 10 mg tablet TAKE 1 TABLET BY MOUTH  DAILY 90 Tab 3    potassium chloride (KLOR-CON) 10 mEq tablet TAKE 1 TABLET BY MOUTH  DAILY 90 Tab 3    alfuzosin SR (UROXATRAL) 10 mg SR tablet TAKE 1 TABLET BY MOUTH  DAILY AFTER DINNER 90 Tab 3    travoprost (TRAVATAN Z) 0.004 % ophthalmic solution Administer 1 Drop to both eyes nightly.  acetaminophen (TYLENOL EXTRA STRENGTH) 500 mg tablet Take 1,000 mg by mouth every six (6) hours as needed for Pain. Visit Vitals  BP (!) 145/72 (BP 1 Location: Left arm, BP Patient Position: Sitting)   Pulse 71   Ht 6' (1.829 m)   Wt 90.9 kg (200 lb 6.4 oz)   SpO2 98%   BMI 27.18 kg/m²     Physical Exam  Constitutional:       General: He is not in acute distress. Appearance: He is well-developed. He is not diaphoretic. HENT:      Head: Atraumatic. Mouth/Throat:      Pharynx: No oropharyngeal exudate. Eyes:      General: No scleral icterus. Right eye: No discharge. Left eye: No discharge. Conjunctiva/sclera: Conjunctivae normal.   Neck:      Thyroid: No thyromegaly.       Vascular: No JVD.      Trachea: No tracheal deviation. Cardiovascular:      Rate and Rhythm: Normal rate and regular rhythm. Heart sounds: Murmur (2/6 systolic murmur best heard at apex/ aortic area with no radiation) heard. No gallop. Pulmonary:      Effort: Pulmonary effort is normal.      Breath sounds: Normal breath sounds. No stridor. No wheezing or rales. Abdominal:      Palpations: Abdomen is soft. Tenderness: There is no abdominal tenderness. There is no rebound. Musculoskeletal:         General: No tenderness. Normal range of motion. Cervical back: Normal range of motion and neck supple. Lymphadenopathy:      Cervical: No cervical adenopathy. Skin:     General: Skin is warm. Neurological:      Mental Status: He is alert and oriented to person, place, and time. Motor: No abnormal muscle tone. Psychiatric:         Behavior: Behavior normal.       ekg sinus rhythm with t wave inversion in anterior leads. 10/15/18   ECHO ADULT COMPLETE 10/19/2018 10/19/2018    Narrative · Normal left ventricular cavity size and systolic function (ejection   fraction normal). Moderate concentric hypertrophy observed. The estimated   ejection fraction is 61 - 65%. No regional wall motion abnormality noted. There is mild (grade 1) left ventricular diastolic dysfunction. · Normal right ventricular size and function. · The left atrial cavity size is mildly dilated. LA index is 35.03 ml/m2. · The right atiral cavity size is mildly dilated. · Echogenicity visualized on the right coronary cusp of the aortic valve,   cannot rule out healed vegetation vs. calcification. Moderate aortic valve   regurgitation with an eccentrically directed jet. · There is mild leaflet thickening of the mitral valve leaflets with mild   mitral annular calcification and mild regurgitation. · Mild tricuspid valve regurgitation. Pulmonary arterial systolic pressure   is 39 mmHg. · Mild pulmonic regurgitation.   · The aortic root is mildly dilated. · Mild aortic valve sclerosis. Echogenicity visualized on the right   coronary cusp, cannot rule out calcification. Moderate aortic valve   regurgitation is present. · Pulmonary arterial systolic pressure is 75.0 mmHg. · Pulmonary arterial systolic pressure is 29.4 mmHg. Signed by: Guerrero Haji MD     Lexiscan : no ischemia  02/14/19   CARDIAC PROCEDURE 02/14/2019 2/14/2019    Narrative Non obstructive epicardial coronary arteries with preserved LV function. Signed by: Guerrero Haji MD   09/28/21    ECHO ADULT COMPLETE 09/29/2021 9/29/2021    Interpretation Summary  · LV: Estimated LVEF is 55 - 60%. Normal cavity size and systolic function (ejection fraction normal). Moderate concentric hypertrophy. Wall motion: normal. Mild (grade 1) left ventricular diastolic dysfunction. · LA: Left Atrium volume index is 29.97 mL/m2. · RV: Mildly dilated right ventricle. · RA: Mildly dilated right atrium. · AV: Aortic valve leaflet calcification present. Moderate aortic valve regurgitation is present. · MV: Mitral annular calcification. Mild mitral valve regurgitation is present. · TV: Right Ventricular Arterial Pressure (RVSP) is 25 mmHg. Pulmonary hypertension not suggested by Doppler findings. · PV: Mild pulmonic valve regurgitation is present. Signed by: Guerrero Haji MD on 9/29/2021  4:00 PM    ASSESSMENT and PLAN    ICD-10-CM ICD-9-CM    1. Nonrheumatic aortic valve insufficiency  I35.1 424.1    2. Dyslipidemia  E78.5 272.4    3. Essential hypertension  I10 401.9    4. Mild CAD  I25.10 414.00      No orders of the defined types were placed in this encounter. Follow-up and Dispositions    · Return in about 6 months (around 5/3/2022). current treatment plan is effective, no change in therapy  use of aspirin to prevent MI and TIA's discussed.     Patient with risk factors seen for atypical chest pain.  ekg is abnormal with concerns for ischemia  No ischemia on stress test  Normal LV function with mild to moderate aortic regurgitation. No vegetation noted on the aortic valve. Underwent recent cardiac cath - mild CAD with normal LVEF. Seen for f/u- bp stable on current meds  Has stable dyspnea. Echo report reviewed with patient-- has moderate AI/ HHD. meawhile continue salt restriction and weight reduction.

## 2022-02-08 ENCOUNTER — HOSPITAL ENCOUNTER (OUTPATIENT)
Dept: CT IMAGING | Age: 72
Discharge: HOME | End: 2022-02-08
Attending: NURSE PRACTITIONER
Payer: MEDICARE

## 2022-02-08 DIAGNOSIS — R31.0 GROSS HEMATURIA: ICD-10-CM

## 2022-02-08 LAB — CREAT UR-MCNC: 1 MG/DL (ref 0.6–1.3)

## 2022-02-08 PROCEDURE — 74178 CT ABD&PLV WO CNTR FLWD CNTR: CPT

## 2022-02-08 PROCEDURE — 82565 ASSAY OF CREATININE: CPT

## 2022-02-08 PROCEDURE — 74011000636 HC RX REV CODE- 636: Performed by: NURSE PRACTITIONER

## 2022-02-08 RX ADMIN — IOPAMIDOL 100 ML: 755 INJECTION, SOLUTION INTRAVENOUS at 07:58

## 2022-02-17 NOTE — PROGRESS NOTES
Reviewed. Will proceed with cystoscopy as discussed. Multiple bilateral kidney stones. Will review at followup    I will forward the results to your PCP.  Please follow up with him for further evaluation if needed

## 2022-03-18 PROBLEM — R94.31 ABNORMAL EKG: Status: ACTIVE | Noted: 2018-10-10

## 2022-03-18 PROBLEM — E78.5 DYSLIPIDEMIA: Status: ACTIVE | Noted: 2018-04-24

## 2022-03-18 PROBLEM — N48.6 PEYRONIE'S SYNDROME: Status: ACTIVE | Noted: 2020-06-11

## 2022-03-19 PROBLEM — R39.9 LOWER URINARY TRACT SYMPTOMS (LUTS): Status: ACTIVE | Noted: 2018-02-27

## 2022-03-19 PROBLEM — G62.9 NEUROPATHY: Status: ACTIVE | Noted: 2020-06-11

## 2022-03-19 PROBLEM — K63.5 POLYP OF COLON: Status: ACTIVE | Noted: 2018-02-27

## 2022-03-19 PROBLEM — N40.1 BENIGN PROSTATIC HYPERPLASIA WITH INCOMPLETE BLADDER EMPTYING: Status: ACTIVE | Noted: 2018-02-27

## 2022-03-19 PROBLEM — R07.9 CHEST PAIN: Status: ACTIVE | Noted: 2018-10-10

## 2022-03-19 PROBLEM — R39.14 BENIGN PROSTATIC HYPERPLASIA WITH INCOMPLETE BLADDER EMPTYING: Status: ACTIVE | Noted: 2018-02-27

## 2022-03-20 PROBLEM — I10 ESSENTIAL HYPERTENSION: Status: ACTIVE | Noted: 2018-02-27

## 2022-04-05 DIAGNOSIS — I10 ESSENTIAL HYPERTENSION: ICD-10-CM

## 2022-04-06 RX ORDER — POTASSIUM CHLORIDE 750 MG/1
TABLET, EXTENDED RELEASE ORAL
Qty: 90 TABLET | Refills: 3 | Status: SHIPPED | OUTPATIENT
Start: 2022-04-06

## 2022-05-04 ENCOUNTER — OFFICE VISIT (OUTPATIENT)
Dept: CARDIOLOGY CLINIC | Age: 72
End: 2022-05-04
Payer: MEDICARE

## 2022-05-04 VITALS
BODY MASS INDEX: 26.64 KG/M2 | DIASTOLIC BLOOD PRESSURE: 66 MMHG | WEIGHT: 201 LBS | HEART RATE: 66 BPM | HEIGHT: 73 IN | SYSTOLIC BLOOD PRESSURE: 132 MMHG

## 2022-05-04 DIAGNOSIS — I35.1 NONRHEUMATIC AORTIC VALVE INSUFFICIENCY: Primary | ICD-10-CM

## 2022-05-04 DIAGNOSIS — I10 ESSENTIAL HYPERTENSION: ICD-10-CM

## 2022-05-04 DIAGNOSIS — I25.10 MILD CAD: ICD-10-CM

## 2022-05-04 DIAGNOSIS — E78.5 DYSLIPIDEMIA: ICD-10-CM

## 2022-05-04 PROCEDURE — 99214 OFFICE O/P EST MOD 30 MIN: CPT | Performed by: INTERNAL MEDICINE

## 2022-05-04 PROCEDURE — G8432 DEP SCR NOT DOC, RNG: HCPCS | Performed by: INTERNAL MEDICINE

## 2022-05-04 PROCEDURE — G8752 SYS BP LESS 140: HCPCS | Performed by: INTERNAL MEDICINE

## 2022-05-04 PROCEDURE — G8427 DOCREV CUR MEDS BY ELIG CLIN: HCPCS | Performed by: INTERNAL MEDICINE

## 2022-05-04 PROCEDURE — G8754 DIAS BP LESS 90: HCPCS | Performed by: INTERNAL MEDICINE

## 2022-05-04 PROCEDURE — 3017F COLORECTAL CA SCREEN DOC REV: CPT | Performed by: INTERNAL MEDICINE

## 2022-05-04 PROCEDURE — 1101F PT FALLS ASSESS-DOCD LE1/YR: CPT | Performed by: INTERNAL MEDICINE

## 2022-05-04 PROCEDURE — G8536 NO DOC ELDER MAL SCRN: HCPCS | Performed by: INTERNAL MEDICINE

## 2022-05-04 PROCEDURE — G8419 CALC BMI OUT NRM PARAM NOF/U: HCPCS | Performed by: INTERNAL MEDICINE

## 2022-05-04 NOTE — PROGRESS NOTES
1. Have you been to the ER, urgent care clinic since your last visit? Hospitalized since your last visit? No    2. Have you seen or consulted any other health care providers outside of the [address] [place] [address] since your last visit? Include any pap smears or colon screening. Yes Where: PCP/ Checkup, Urology/ Cysto done     3. Do you need any refills today?   no

## 2022-05-04 NOTE — PROGRESS NOTES
HISTORY OF PRESENT ILLNESS  Daryl Lui is a 67 y.o. male. Hypertension  The history is provided by the patient. This is a chronic problem. The problem occurs every several days. The problem has not changed since onset. Shortness of Breath  The history is provided by the patient. This is a chronic problem. The problem occurs intermittently. The problem has not changed since onset. Pertinent negatives include no ear pain, no neck pain, no wheezing, no rash and no leg swelling. Review of Systems   Constitutional: Negative for chills and weight loss. HENT: Negative for congestion, ear discharge, ear pain, hearing loss, nosebleeds and tinnitus. Eyes: Negative for blurred vision. Respiratory: Negative for wheezing and stridor. Cardiovascular: Negative for leg swelling. Gastrointestinal: Negative for heartburn. Musculoskeletal: Negative for myalgias and neck pain. Skin: Negative for itching and rash. Neurological: Negative for tingling, tremors, focal weakness and loss of consciousness. Psychiatric/Behavioral: Negative for depression and suicidal ideas.      Family History   Problem Relation Age of Onset    No Known Problems Mother     No Known Problems Father        Past Medical History:   Diagnosis Date    Aortic valve insufficiency     Arthritis     Hypertension     Lymphoma ([facility] [address]     Other ill-defined conditions(799.89)     murmer       Past Surgical History:   Procedure Laterality Date    HX APPENDECTOMY      HX OTHER SURGICAL Left 1's    Exc. mass foot     HX OTHER SURGICAL Right     release Dequervain's contracture    HX OTHER SURGICAL Left     Removal large nevi behind ear       Social History     Tobacco Use    Smoking status: Former Smoker     Packs/day: 0.00     Years: 41.00     Pack years: 0.00     Quit date: 1980     Years since quittin.3    Smokeless tobacco: Never Used   Substance Use Topics    Alcohol use: No       No Known Allergies    Outpatient Medications Marked as Taking for the 5/4/22 encounter (Office Visit) with Christianne Neff MD   Medication Sig Dispense Refill    potassium chloride (KLOR-CON M10) 10 mEq tablet TAKE 1 TABLET BY MOUTH  DAILY 90 Tablet 3    gabapentin (NEURONTIN) 400 mg capsule Take 1 Capsule by mouth three (3) times daily. Max Daily Amount: 1,200 mg. 270 Capsule 0    hydroCHLOROthiazide (HYDRODIURIL) 25 mg tablet TAKE 1 TABLET BY MOUTH  DAILY 90 Tablet 3    aspirin delayed-release 81 mg tablet Take 81 mg by mouth daily.  cholecalciferol (VITAMIN D3) (1000 Units /25 mcg) tablet Take 1,000 Units by mouth daily.  therapeutic multivitamin (THERAGRAN) tablet Take 1 Tab by mouth daily.  atorvastatin (LIPITOR) 40 mg tablet TAKE 1 TABLET BY MOUTH  DAILY 90 Tab 3    benazepriL (LOTENSIN) 40 mg tablet TAKE 1 TABLET BY MOUTH  DAILY 90 Tab 3    amLODIPine (NORVASC) 10 mg tablet TAKE 1 TABLET BY MOUTH  DAILY 90 Tab 3    alfuzosin SR (UROXATRAL) 10 mg SR tablet TAKE 1 TABLET BY MOUTH  DAILY AFTER DINNER 90 Tab 3    travoprost (TRAVATAN Z) 0.004 % ophthalmic solution Administer 1 Drop to both eyes nightly.  acetaminophen (TYLENOL EXTRA STRENGTH) 500 mg tablet Take 1,000 mg by mouth every six (6) hours as needed for Pain. Visit Vitals  /66 (BP 1 Location: Left upper arm, BP Patient Position: Sitting, BP Cuff Size: Adult)   Pulse 66   Ht 6' 1\" (1.854 m)   Wt 91.2 kg (201 lb)   BMI 26.52 kg/m²     Physical Exam  Constitutional:       General: He is not in acute distress. Appearance: He is well-developed. He is not diaphoretic. HENT:      Head: Atraumatic. Mouth/Throat:      Pharynx: No oropharyngeal exudate. Eyes:      General: No scleral icterus. Right eye: No discharge. Left eye: No discharge. Conjunctiva/sclera: Conjunctivae normal.   Neck:      Thyroid: No thyromegaly. Vascular: No JVD. Trachea: No tracheal deviation. Cardiovascular:      Rate and Rhythm: Normal rate and regular rhythm. Heart sounds: Murmur (2/6 systolic murmur best heard at apex/ aortic area with no radiation) heard. No gallop. Pulmonary:      Effort: Pulmonary effort is normal.      Breath sounds: Normal breath sounds. No stridor. No wheezing or rales. Abdominal:      Palpations: Abdomen is soft. Tenderness: There is no abdominal tenderness. There is no rebound. Musculoskeletal:         General: No tenderness. Normal range of motion. Cervical back: Normal range of motion and neck supple. Lymphadenopathy:      Cervical: No cervical adenopathy. Skin:     General: Skin is warm. Neurological:      Mental Status: He is alert and oriented to person, place, and time. Motor: No abnormal muscle tone. Psychiatric:         Behavior: Behavior normal.       ekg sinus rhythm with t wave inversion in anterior leads. 10/15/18   ECHO ADULT COMPLETE 10/19/2018 10/19/2018    Narrative · Normal left ventricular cavity size and systolic function (ejection   fraction normal). Moderate concentric hypertrophy observed. The estimated   ejection fraction is 61 - 65%. No regional wall motion abnormality noted. There is mild (grade 1) left ventricular diastolic dysfunction. · Normal right ventricular size and function. · The left atrial cavity size is mildly dilated. LA index is 35.03 ml/m2. · The right atiral cavity size is mildly dilated. · Echogenicity visualized on the right coronary cusp of the aortic valve,   cannot rule out healed vegetation vs. calcification. Moderate aortic valve   regurgitation with an eccentrically directed jet. · There is mild leaflet thickening of the mitral valve leaflets with mild   mitral annular calcification and mild regurgitation. · Mild tricuspid valve regurgitation. Pulmonary arterial systolic pressure   is 39 mmHg. · Mild pulmonic regurgitation. · The aortic root is mildly dilated.   · Mild aortic valve sclerosis. Echogenicity visualized on the right   coronary cusp, cannot rule out calcification. Moderate aortic valve   regurgitation is present. · Pulmonary arterial systolic pressure is 32.5 mmHg. · Pulmonary arterial systolic pressure is 39.7 mmHg. Signed by: Theodore Vaca MD     Lexiscan : no ischemia  02/14/19   CARDIAC PROCEDURE 02/14/2019 2/14/2019    Narrative Non obstructive epicardial coronary arteries with preserved LV function. Signed by: Theodore Vaca MD   09/28/21    ECHO ADULT COMPLETE 09/29/2021 9/29/2021    Interpretation Summary  · LV: Estimated LVEF is 55 - 60%. Normal cavity size and systolic function (ejection fraction normal). Moderate concentric hypertrophy. Wall motion: normal. Mild (grade 1) left ventricular diastolic dysfunction. · LA: Left Atrium volume index is 29.97 mL/m2. · RV: Mildly dilated right ventricle. · RA: Mildly dilated right atrium. · AV: Aortic valve leaflet calcification present. Moderate aortic valve regurgitation is present. · MV: Mitral annular calcification. Mild mitral valve regurgitation is present. · TV: Right Ventricular Arterial Pressure (RVSP) is 25 mmHg. Pulmonary hypertension not suggested by Doppler findings. · PV: Mild pulmonic valve regurgitation is present. Signed by: Theodore Vaca MD on 9/29/2021  4:00 PM    ASSESSMENT and PLAN    ICD-10-CM ICD-9-CM    1. Nonrheumatic aortic valve insufficiency  I35.1 424.1 ECHO ADULT COMPLETE   2. Dyslipidemia  E78.5 272.4    3. Essential hypertension  I10 401.9    4. Mild CAD  I25.10 414.00      Orders Placed This Encounter    ECHO ADULT COMPLETE     Standing Status:   Future     Standing Expiration Date:   11/4/2022     Order Specific Question:   Contrast Enhancement (Bubble Study, Definity, Optison) may be used if criteria listed in established evidence-based protocol has been identified.      Answer:   Yes     Follow-up and Dispositions    · Return in about 6 months (around 11/4/2022). current treatment plan is effective, no change in therapy  use of aspirin to prevent MI and TIA's discussed. Patient with risk factors seen for atypical chest pain.  ekg is abnormal with concerns for ischemia  No ischemia on stress test  Normal LV function with mild to moderate aortic regurgitation. No vegetation noted on the aortic valve. Underwent recent cardiac cath - mild CAD with normal LVEF. Seen for f/u- bp stable on current meds  Has moderate aortic regurgitation with hypertensive heart disease. Recommend optimal blood pressure control. Follow-up in 6 months with repeat echo to assess AI. All questions answered.

## 2022-05-24 ENCOUNTER — TRANSCRIBE ORDER (OUTPATIENT)
Dept: SCHEDULING | Age: 72
End: 2022-05-24

## 2022-05-24 DIAGNOSIS — I82.401 DEEP VEIN THROMBOSIS OF RIGHT LOWER EXTREMITY (HCC): ICD-10-CM

## 2022-05-24 DIAGNOSIS — I82.629: Primary | ICD-10-CM

## 2022-05-26 ENCOUNTER — HOSPITAL ENCOUNTER (OUTPATIENT)
Dept: VASCULAR SURGERY | Age: 72
Discharge: HOME | End: 2022-05-26
Attending: INTERNAL MEDICINE
Payer: MEDICARE

## 2022-05-26 DIAGNOSIS — I82.401 DEEP VEIN THROMBOSIS OF RIGHT LOWER EXTREMITY (HCC): ICD-10-CM

## 2022-05-26 DIAGNOSIS — I82.629: ICD-10-CM

## 2022-05-26 PROCEDURE — 93971 EXTREMITY STUDY: CPT

## 2022-06-06 DIAGNOSIS — I10 ESSENTIAL HYPERTENSION: ICD-10-CM

## 2022-06-08 ENCOUNTER — TELEPHONE (OUTPATIENT)
Dept: FAMILY MEDICINE CLINIC | Age: 72
End: 2022-06-08

## 2022-06-08 RX ORDER — HYDROCHLOROTHIAZIDE 25 MG/1
TABLET ORAL
Qty: 90 TABLET | Refills: 3 | Status: SHIPPED | OUTPATIENT
Start: 2022-06-08

## 2022-09-07 ENCOUNTER — TRANSCRIBE ORDER (OUTPATIENT)
Dept: SCHEDULING | Age: 72
End: 2022-09-07

## 2022-09-07 DIAGNOSIS — C83.05 SMALL CELL B-CELL LYMPH, NODES OF ING REGION AND LOWER LIMB (HCC): Primary | ICD-10-CM

## 2022-09-13 ENCOUNTER — HOSPITAL ENCOUNTER (OUTPATIENT)
Dept: CT IMAGING | Age: 72
Discharge: HOME | End: 2022-09-13
Attending: INTERNAL MEDICINE
Payer: MEDICARE

## 2022-09-13 DIAGNOSIS — C83.05 SMALL CELL B-CELL LYMPH, NODES OF ING REGION AND LOWER LIMB (HCC): ICD-10-CM

## 2022-09-13 LAB — CREAT UR-MCNC: 1 MG/DL (ref 0.6–1.3)

## 2022-09-13 PROCEDURE — 82565 ASSAY OF CREATININE: CPT

## 2022-09-13 PROCEDURE — 74177 CT ABD & PELVIS W/CONTRAST: CPT

## 2022-09-13 PROCEDURE — 70491 CT SOFT TISSUE NECK W/DYE: CPT

## 2022-09-13 PROCEDURE — 74011000636 HC RX REV CODE- 636: Performed by: INTERNAL MEDICINE

## 2022-09-13 RX ADMIN — IOPAMIDOL 125 ML: 612 INJECTION, SOLUTION INTRAVENOUS at 13:26

## 2022-09-23 PROBLEM — C83.00 DIFFUSE WELL-DIFFERENTIATED LYMPHOCYTIC LYMPHOMA (HCC): Status: ACTIVE | Noted: 2022-09-23

## 2022-09-26 ENCOUNTER — OFFICE VISIT (OUTPATIENT)
Dept: FAMILY MEDICINE CLINIC | Age: 72
End: 2022-09-26
Payer: MEDICARE

## 2022-09-26 VITALS
WEIGHT: 195.38 LBS | DIASTOLIC BLOOD PRESSURE: 80 MMHG | TEMPERATURE: 96.3 F | SYSTOLIC BLOOD PRESSURE: 152 MMHG | RESPIRATION RATE: 14 BRPM | HEART RATE: 61 BPM | OXYGEN SATURATION: 97 % | BODY MASS INDEX: 25.78 KG/M2

## 2022-09-26 DIAGNOSIS — C83.00 DIFFUSE WELL-DIFFERENTIATED LYMPHOCYTIC LYMPHOMA (HCC): ICD-10-CM

## 2022-09-26 DIAGNOSIS — I10 ESSENTIAL HYPERTENSION: Primary | ICD-10-CM

## 2022-09-26 DIAGNOSIS — M65.312 TRIGGER THUMB OF LEFT HAND: ICD-10-CM

## 2022-09-26 DIAGNOSIS — R21 RASH: ICD-10-CM

## 2022-09-26 DIAGNOSIS — Z23 ENCOUNTER FOR IMMUNIZATION: ICD-10-CM

## 2022-09-26 DIAGNOSIS — E78.5 DYSLIPIDEMIA: ICD-10-CM

## 2022-09-26 PROCEDURE — 1123F ACP DISCUSS/DSCN MKR DOCD: CPT | Performed by: STUDENT IN AN ORGANIZED HEALTH CARE EDUCATION/TRAINING PROGRAM

## 2022-09-26 PROCEDURE — G0008 ADMIN INFLUENZA VIRUS VAC: HCPCS | Performed by: STUDENT IN AN ORGANIZED HEALTH CARE EDUCATION/TRAINING PROGRAM

## 2022-09-26 PROCEDURE — 99204 OFFICE O/P NEW MOD 45 MIN: CPT | Performed by: STUDENT IN AN ORGANIZED HEALTH CARE EDUCATION/TRAINING PROGRAM

## 2022-09-26 PROCEDURE — 90694 VACC AIIV4 NO PRSRV 0.5ML IM: CPT | Performed by: STUDENT IN AN ORGANIZED HEALTH CARE EDUCATION/TRAINING PROGRAM

## 2022-09-26 NOTE — PROGRESS NOTES
1. \"Have you been to the ER, urgent care clinic since your last visit? Hospitalized since your last visit? \" No    2. \"Have you seen or consulted any other health care providers outside of the [address] [place] [address] since your last visit? \" No     3. For patients aged 39-70: Has the patient had a colonoscopy / FIT/ Cologuard? Yes - no Care Gap present      If the patient is female:    4. For patients aged 41-77: Has the patient had a mammogram within the past 2 years? NA - based on age or sex      11. For patients aged 21-65: Has the patient had a pap smear? NA - based on age or sex      Patient was put on fluconazole and developed the rash in the face after he finished taking them. Saw a dermatologist and stated the medication he was taking he was giving to many. Patient has an appointment with dermatologist on the 4th of October. Left  gets stuck a little and pops back, Patient states its very sore. Colonoscopy done at [facility] in [place] some years ago.  Dr. Juma Fernandez

## 2022-09-26 NOTE — PROGRESS NOTES
Bruna Mckeon (: 1950) is a 67 y.o. male, new patient, here for evaluation of the following chief complaint(s):  Establish Care (Rash on the groin and the face.)       Assessment/Plan:  1. Essential hypertension-uncontrolled today. Patient would prefer to focus on improving his diet and decreasing salt intake before adjusting medications. 2. Dyslipidemia-on statin. Unclear if oncology is getting lipid panels with her routine blood work. We will request records and then determine need for lipid panel. 3. Diffuse well-differentiated lymphocytic lymphoma (HCC)-continue to follow-up with oncology. Well-controlled at this time. 4. Rash- Likely from candida and then medication reaction to overdose of fluconazole. Follows with dermatology and is on a high potency topical steroid. 5. Trigger thumb of left hand- Pt will set up an appt with me this week for steroid injection. 6. Encounter for immunization  -     INFLUENZA, FLUAD, (AGE 72 Y+), IM, PF, 0.5 ML  -     AL IMMUNIZ ADMIN,1 SINGLE/COMB VAC/TOXOID      Return in about 2 days (around 2022) for trigger thumb injection. 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 Rash- Derm said it was from medication (fluconazole). Non-Hodgkins lymphoma- Diagnosed after incidental enlarged LN was found on ortho imaging. Biopsy proven. Has not needed to be on suppressive medication. Following with oncology. HTN- Took all meds this morning but endorses difficulty with following diet recommendations. Really likes salt, copeland and other high salt foods. Denies lightheadedness, dizziness, chest pain, palpitations. Rash- Saw an NP at derm that prescribed oral fluconazole for 2 weeks. This improved the original rash but made a new one. Now seeing a different derm and given clobetasol.        Physical Exam  Blood pressure (!) 152/80, pulse 61, temperature (!) 96.3 °F (35.7 °C), temperature source Tympanic, resp. rate 14, weight 195 lb 6 oz (88.6 kg), SpO2 97 %. Body mass index is 25.78 kg/m². General appearance - Alert, NAD. Head - Atraumatic. Normocephalic. No lymphadenopathy  Eyes - EOMI. Sclera white. Ears - Hearing grossly normal.    Nose - Nares patent, no polyps  Throat - pharynx clear, no exudates. Respiratory - LCTAB. No wheeze/rale/rhonchi  Heart - Normal rate, regular rhythm. No m/r/r  Abdomen - Soft, non tender. Non distended. Neurological - No focal deficits. Speech normal.   Musculoskeletal - Normal ROM, Gait normal.    Extremities - L thumb with palpable sore nodule on flexor tendon. Thumb locks with flexion. Skin - Maculopapular rash scattered across lower face. Medical History- Reviewed Social History- Reviewed Surgical History- Reviewed   Girma Kothari has a past medical history of Aortic valve insufficiency, Arthritis, Lymphoma ([address] and Other ill-defined conditions(799.89). Girma Kothari reports that he quit smoking about 42 years ago. His smoking use included cigarettes. He has never used smokeless tobacco. He reports that he does not drink alcohol and does not use drugs. Girma Kothari has a past surgical history that includes hx appendectomy (1961); hx other surgical (Left, 1990's); hx other surgical (Right, 1980); and hx other surgical (Left, 2014).          Problem List- Reviewed   Gimra Kothari has History of colon polyps, Family history of colon cancer, Benign prostatic hyperplasia with incomplete bladder emptying, Lower urinary tract symptoms (LUTS), Essential hypertension, Polyp of colon, Dyslipidemia, Abnormal EKG, Chest pain, Peyronie's syndrome, Neuropathy, Diffuse well-differentiated lymphocytic lymphoma ([address] Nuclear senile cataract, and Ocular hypertension on their problem list.       Current Outpatient Medications   Medication Instructions    acetaminophen (TYLENOL) 1,000 mg, EVERY 6 HOURS AS NEEDED    alfuzosin SR (UROXATRAL) 10 mg SR tablet TAKE 1 TABLET BY MOUTH DAILY AFTER DINNER    amLODIPine (NORVASC) 10 mg tablet TAKE 1 TABLET BY MOUTH  DAILY    aspirin delayed-release 81 mg, Oral, DAILY    atorvastatin (LIPITOR) 40 mg tablet TAKE 1 TABLET BY MOUTH  DAILY    benazepriL (LOTENSIN) 40 mg tablet TAKE 1 TABLET BY MOUTH  DAILY    cholecalciferol (VITAMIN D3) 1,000 Units, DAILY    gabapentin (NEURONTIN) 400 mg, Oral, 3 TIMES DAILY    hydroCHLOROthiazide (HYDRODIURIL) 25 mg tablet TAKE 1 TABLET BY MOUTH  DAILY    potassium chloride (KLOR-CON M10) 10 mEq tablet TAKE 1 TABLET BY MOUTH  DAILY    therapeutic multivitamin (THERAGRAN) tablet 1 Tablet, Oral, DAILY    travoprost (TRAVATAN Z) 0.004 % ophthalmic solution 1 Drop, EVERY BEDTIME       On this date 09/26/2022 I have spent 45 minutes reviewing previous notes, test results and face to face with the patient discussing the diagnosis and importance of compliance with the treatment plan as well as documenting on the day of the visit. An electronic signature was used to authenticate this note.   -- Ryan Ontiveros MD

## 2022-09-28 ENCOUNTER — OFFICE VISIT (OUTPATIENT)
Dept: FAMILY MEDICINE CLINIC | Age: 72
End: 2022-09-28
Payer: MEDICARE

## 2022-09-28 VITALS
WEIGHT: 195 LBS | OXYGEN SATURATION: 96 % | DIASTOLIC BLOOD PRESSURE: 64 MMHG | BODY MASS INDEX: 25.73 KG/M2 | TEMPERATURE: 96.6 F | HEART RATE: 64 BPM | SYSTOLIC BLOOD PRESSURE: 140 MMHG | RESPIRATION RATE: 14 BRPM

## 2022-09-28 DIAGNOSIS — M65.312 TRIGGER THUMB OF LEFT HAND: Primary | ICD-10-CM

## 2022-09-28 PROCEDURE — 20550 NJX 1 TENDON SHEATH/LIGAMENT: CPT | Performed by: STUDENT IN AN ORGANIZED HEALTH CARE EDUCATION/TRAINING PROGRAM

## 2022-09-28 RX ORDER — LIDOCAINE HYDROCHLORIDE 10 MG/ML
0.5 INJECTION INFILTRATION; PERINEURAL ONCE
Qty: 0.5 ML | Refills: 0
Start: 2022-09-28 | End: 2022-09-28

## 2022-09-28 RX ORDER — TRIAMCINOLONE ACETONIDE 40 MG/ML
20 INJECTION, SUSPENSION INTRA-ARTICULAR; INTRAMUSCULAR ONCE
Qty: 0.5 ML | Refills: 0
Start: 2022-09-28 | End: 2022-09-28

## 2022-09-28 NOTE — PROGRESS NOTES
Easton Escudero (: 1950) is a 67 y.o. male, established patient, here for evaluation of the following chief complaint(s):  Injection (Left thumb)       Assessment/Plan:  1. Trigger thumb of left hand-procedure as detailed below. -     INJECT TENDON SHEATH/LIGAMENT  -     TRIAMCINOLONE ACETONIDE INJ  -     triamcinolone acetonide (Kenalog) 40 mg/mL injection; 0.5 mL by IntraLESional route once for 1 dose., No Print, Disp-0.5 mL, R-0  -     lidocaine (XYLOCAINE) 10 mg/mL (1 %) injection; 0.5 mL by IntraDERMal route once for 1 dose., No Print, Disp-0.5 mL, R-0    Return in about 3 months (around 2022) for routine check up. [facility]  OFFICE PROCEDURE PROGRESS NOTE  Chart reviewed for the following:   I, Shahla Yung MD, have reviewed the History, Physical and updated the Allergic reactions for [address] performed immediately prior to start of procedure:  Pedro Manley MD, have performed the following reviews on Easton Escudero prior to the start of the procedure:            * Patient was identified by name and date of birth   * Agreement on procedure being performed was verified  * Risks and Benefits explained to the patient  * Procedure site verified and marked as necessary  * Patient was positioned for comfort  * Consent was signed and verified     Time: 9:24 AM  Date of procedure: 2022  Procedure performed by:  Shahla Yung MD  Provider assisted by: Jaskaran Velez MA  How tolerated by patient: tolerated the procedure well with no complications    Procedure: The left thumb was cleaned and prepped with sterile technique using Chloraprep x3 over the MCP joint. A 25-gauge needle was inserted through the A1 pulley to the level of the tendon. Placement was confirmed with finger flexion and movement of inserted needle.   Needle was barely withdrawn until not moved with finger flexion and 0.5 mL of Kenalog 40 mg/ML and 0.5 mL of lidocaine 1% without epinephrine was injected. The injection area was again cleansed and a bandage applied. The patient tolerated the procedure well and there were no complications. POST PROCEDURE INSTRUCTIONS: The patient has been asked to report to us any redness, swelling, inflammation, or fevers. The patient has been asked to restrict the use of the left hand for the next 24 hours. Physical Exam  Blood pressure (!) 140/64, pulse 64, temperature (!) 96.6 °F (35.9 °C), temperature source Tympanic, resp. rate 14, weight 195 lb (88.5 kg), SpO2 96 %. Body mass index is 25.73 kg/m². General appearance - Alert, NAD. Head - Atraumatic. Normocephalic. Eyes - EOMI. Sclera white. Ears - Hearing grossly normal.    Neurological - No gross deficits. Speech normal.   Musculoskeletal -left thumb with palpable nodule at A1 pulley that is tender to palpation. Locking of L thumb with full active flexion. Medical History- Reviewed Social History- Reviewed Surgical History- Reviewed   Aparna Crow has a past medical history of Aortic valve insufficiency, Arthritis, Lymphoma ([address] and Other ill-defined conditions(799.89). Aparna Crow reports that he quit smoking about 42 years ago. His smoking use included cigarettes. He has never used smokeless tobacco. He reports that he does not drink alcohol and does not use drugs. Aparna Crow has a past surgical history that includes hx appendectomy (1961); hx other surgical (Left, 1990's); hx other surgical (Right, 1980); and hx other surgical (Left, 2014).          Problem List- Reviewed   Aparna Crow has History of colon polyps, Family history of colon cancer, Benign prostatic hyperplasia with incomplete bladder emptying, Lower urinary tract symptoms (LUTS), Essential hypertension, Polyp of colon, Dyslipidemia, Abnormal EKG, Chest pain, Peyronie's syndrome, Neuropathy, Diffuse well-differentiated lymphocytic lymphoma ([address] Nuclear senile cataract, and Ocular hypertension on their problem list. Current Outpatient Medications   Medication Instructions    acetaminophen (TYLENOL) 1,000 mg, EVERY 6 HOURS AS NEEDED    alfuzosin SR (UROXATRAL) 10 mg SR tablet TAKE 1 TABLET BY MOUTH  DAILY AFTER DINNER    amLODIPine (NORVASC) 10 mg tablet TAKE 1 TABLET BY MOUTH  DAILY    aspirin delayed-release 81 mg, Oral, DAILY    atorvastatin (LIPITOR) 40 mg tablet TAKE 1 TABLET BY MOUTH  DAILY    benazepriL (LOTENSIN) 40 mg tablet TAKE 1 TABLET BY MOUTH  DAILY    cholecalciferol (VITAMIN D3) 1,000 Units, DAILY    gabapentin (NEURONTIN) 400 mg, Oral, 3 TIMES DAILY    hydroCHLOROthiazide (HYDRODIURIL) 25 mg tablet TAKE 1 TABLET BY MOUTH  DAILY    lidocaine (XYLOCAINE) 10 mg/mL (1 %) injection 0.5 mL, IntraDERMal, ONCE    potassium chloride (KLOR-CON M10) 10 mEq tablet TAKE 1 TABLET BY MOUTH  DAILY    therapeutic multivitamin (THERAGRAN) tablet 1 Tablet, Oral, DAILY    travoprost (TRAVATAN Z) 0.004 % ophthalmic solution 1 Drop, EVERY BEDTIME    triamcinolone acetonide (KENALOG) 20 mg, IntraLESional, ONCE           An electronic signature was used to authenticate this note.   -- Gio Santos MD

## 2022-11-04 ENCOUNTER — OFFICE VISIT (OUTPATIENT)
Dept: CARDIOLOGY CLINIC | Age: 72
End: 2022-11-04
Payer: MEDICARE

## 2022-11-04 VITALS
OXYGEN SATURATION: 100 % | HEART RATE: 65 BPM | BODY MASS INDEX: 26.11 KG/M2 | WEIGHT: 197 LBS | DIASTOLIC BLOOD PRESSURE: 60 MMHG | SYSTOLIC BLOOD PRESSURE: 124 MMHG | HEIGHT: 73 IN

## 2022-11-04 DIAGNOSIS — I25.10 MILD CAD: ICD-10-CM

## 2022-11-04 DIAGNOSIS — I35.1 NONRHEUMATIC AORTIC VALVE INSUFFICIENCY: Primary | ICD-10-CM

## 2022-11-04 DIAGNOSIS — E78.5 DYSLIPIDEMIA: ICD-10-CM

## 2022-11-04 DIAGNOSIS — I10 ESSENTIAL HYPERTENSION: ICD-10-CM

## 2022-11-04 PROCEDURE — G8417 CALC BMI ABV UP PARAM F/U: HCPCS | Performed by: INTERNAL MEDICINE

## 2022-11-04 PROCEDURE — 99214 OFFICE O/P EST MOD 30 MIN: CPT | Performed by: INTERNAL MEDICINE

## 2022-11-04 PROCEDURE — G8754 DIAS BP LESS 90: HCPCS | Performed by: INTERNAL MEDICINE

## 2022-11-04 PROCEDURE — G8752 SYS BP LESS 140: HCPCS | Performed by: INTERNAL MEDICINE

## 2022-11-04 PROCEDURE — G8536 NO DOC ELDER MAL SCRN: HCPCS | Performed by: INTERNAL MEDICINE

## 2022-11-04 PROCEDURE — 1123F ACP DISCUSS/DSCN MKR DOCD: CPT | Performed by: INTERNAL MEDICINE

## 2022-11-04 PROCEDURE — 3074F SYST BP LT 130 MM HG: CPT | Performed by: INTERNAL MEDICINE

## 2022-11-04 PROCEDURE — G8432 DEP SCR NOT DOC, RNG: HCPCS | Performed by: INTERNAL MEDICINE

## 2022-11-04 PROCEDURE — 1101F PT FALLS ASSESS-DOCD LE1/YR: CPT | Performed by: INTERNAL MEDICINE

## 2022-11-04 PROCEDURE — 3017F COLORECTAL CA SCREEN DOC REV: CPT | Performed by: INTERNAL MEDICINE

## 2022-11-04 PROCEDURE — 3078F DIAST BP <80 MM HG: CPT | Performed by: INTERNAL MEDICINE

## 2022-11-04 PROCEDURE — G8427 DOCREV CUR MEDS BY ELIG CLIN: HCPCS | Performed by: INTERNAL MEDICINE

## 2022-11-04 RX ORDER — MENTHOL
1000 GEL (GRAM) TOPICAL DAILY
COMMUNITY

## 2022-11-04 NOTE — PROGRESS NOTES
1. Have you been to the ER, urgent care clinic since your last visit? Hospitalized since your last visit? No    2. Have you seen or consulted any other health care providers outside of the [address] [place] [address] since your last visit? Include any pap smears or colon screening.  No

## 2022-11-04 NOTE — PROGRESS NOTES
HISTORY OF PRESENT ILLNESS  Mika Crowell is a 67 y.o. male. Hypertension  The history is provided by the Patient. This is a chronic problem. The problem occurs every several days. The problem has not changed since onset. Associated symptoms include shortness of breath. Shortness of Breath  The history is provided by the Patient. This is a chronic problem. The problem occurs intermittently. The problem has not changed since onset. Pertinent negatives include no ear pain, no neck pain, no wheezing, no rash and no leg swelling. Follow-up  Associated symptoms include shortness of breath. Review of Systems   Constitutional:  Negative for chills and weight loss. HENT:  Negative for congestion, ear discharge, ear pain, hearing loss, nosebleeds and tinnitus. Eyes:  Negative for blurred vision. Respiratory:  Positive for shortness of breath. Negative for wheezing and stridor. Cardiovascular:  Negative for leg swelling. Gastrointestinal:  Negative for heartburn. Musculoskeletal:  Negative for myalgias and neck pain. Skin:  Negative for itching and rash. Neurological:  Negative for tingling, tremors, focal weakness and loss of consciousness. Psychiatric/Behavioral:  Negative for depression and suicidal ideas.     Family History   Problem Relation Age of Onset    No Known Problems Mother     No Known Problems Father        Past Medical History:   Diagnosis Date    Aortic valve insufficiency     Arthritis     Lymphoma ([facility] [address]     Other ill-defined conditions(799.89)     murmer       Past Surgical History:   Procedure Laterality Date    HX APPENDECTOMY  1961    HX OTHER SURGICAL Left 1990's    Exc. mass foot     HX OTHER SURGICAL Right 1980    release Dequervain's contracture    HX OTHER SURGICAL Left 2014    Removal large nevi behind ear       Social History     Tobacco Use    Smoking status: Former     Packs/day: 0.00     Years: 41.00     Pack years: 0.00     Types: Cigarettes     Quit date: 1/1/1980 Years since quittin.8    Smokeless tobacco: Never   Substance Use Topics    Alcohol use: No       No Known Allergies    Outpatient Medications Marked as Taking for the 22 encounter (Office Visit) with Mckenzie West MD   Medication Sig Dispense Refill    vitamin e (E GEMS) 670 mg (1,000 unit) capsule Take 1,000 Units by mouth daily. alfuzosin SR (UROXATRAL) 10 mg SR tablet TAKE 1 TABLET BY MOUTH  DAILY AFTER DINNER 90 Tablet 3    hydroCHLOROthiazide (HYDRODIURIL) 25 mg tablet TAKE 1 TABLET BY MOUTH  DAILY 90 Tablet 3    potassium chloride (KLOR-CON M10) 10 mEq tablet TAKE 1 TABLET BY MOUTH  DAILY 90 Tablet 3    gabapentin (NEURONTIN) 400 mg capsule Take 1 Capsule by mouth three (3) times daily. Max Daily Amount: 1,200 mg. 270 Capsule 0    aspirin delayed-release 81 mg tablet Take 81 mg by mouth daily. cholecalciferol (VITAMIN D3) (1000 Units /25 mcg) tablet Take 1,000 Units by mouth daily. therapeutic multivitamin (THERAGRAN) tablet Take 1 Tab by mouth daily. atorvastatin (LIPITOR) 40 mg tablet TAKE 1 TABLET BY MOUTH  DAILY 90 Tab 3    benazepriL (LOTENSIN) 40 mg tablet TAKE 1 TABLET BY MOUTH  DAILY 90 Tab 3    amLODIPine (NORVASC) 10 mg tablet TAKE 1 TABLET BY MOUTH  DAILY 90 Tab 3    travoprost (TRAVATAN Z) 0.004 % ophthalmic solution Administer 1 Drop to both eyes nightly. acetaminophen (TYLENOL) 500 mg tablet Take 1,000 mg by mouth every six (6) hours as needed for Pain. Visit Vitals  /60 (BP 1 Location: Left upper arm, BP Patient Position: Sitting, BP Cuff Size: Small adult)   Pulse 65   Ht 6' 1\" (1.854 m)   Wt 89.4 kg (197 lb)   SpO2 100%   BMI 25.99 kg/m²     Physical Exam  Constitutional:       General: He is not in acute distress. Appearance: He is well-developed. He is not diaphoretic. HENT:      Head: Atraumatic. Mouth/Throat:      Pharynx: No oropharyngeal exudate. Eyes:      General: No scleral icterus. Right eye: No discharge. Left eye: No discharge. Conjunctiva/sclera: Conjunctivae normal.   Neck:      Thyroid: No thyromegaly. Vascular: No JVD. Trachea: No tracheal deviation. Cardiovascular:      Rate and Rhythm: Normal rate and regular rhythm. Heart sounds: Murmur (2/6 systolic murmur best heard at apex/ aortic area with no radiation) heard. No gallop. Pulmonary:      Effort: Pulmonary effort is normal.      Breath sounds: Normal breath sounds. No stridor. No wheezing or rales. Abdominal:      Palpations: Abdomen is soft. Tenderness: There is no abdominal tenderness. There is no rebound. Musculoskeletal:         General: No tenderness. Normal range of motion. Cervical back: Normal range of motion and neck supple. Lymphadenopathy:      Cervical: No cervical adenopathy. Skin:     General: Skin is warm. Neurological:      Mental Status: He is alert and oriented to person, place, and time. Motor: No abnormal muscle tone. Psychiatric:         Behavior: Behavior normal.     ekg sinus rhythm with t wave inversion in anterior leads. 10/15/18   ECHO ADULT COMPLETE 10/19/2018 10/19/2018    Narrative · Normal left ventricular cavity size and systolic function (ejection   fraction normal). Moderate concentric hypertrophy observed. The estimated   ejection fraction is 61 - 65%. No regional wall motion abnormality noted. There is mild (grade 1) left ventricular diastolic dysfunction. · Normal right ventricular size and function. · The left atrial cavity size is mildly dilated. LA index is 35.03 ml/m2. · The right atiral cavity size is mildly dilated. · Echogenicity visualized on the right coronary cusp of the aortic valve,   cannot rule out healed vegetation vs. calcification. Moderate aortic valve   regurgitation with an eccentrically directed jet. · There is mild leaflet thickening of the mitral valve leaflets with mild   mitral annular calcification and mild regurgitation.   · Mild tricuspid valve regurgitation. Pulmonary arterial systolic pressure   is 39 mmHg. · Mild pulmonic regurgitation. · The aortic root is mildly dilated. · Mild aortic valve sclerosis. Echogenicity visualized on the right   coronary cusp, cannot rule out calcification. Moderate aortic valve   regurgitation is present. · Pulmonary arterial systolic pressure is 84.8 mmHg. · Pulmonary arterial systolic pressure is 84.3 mmHg. Signed by: Miriam Landa MD     Lexiscan : no ischemia  49/33/04  Complications documented before study signed (2/14/2019  8:36 PM)     No complications were associated with this study. Documented by Miriam Landa MD - 2/14/2019  4:14 PM        Coronary Findings    Diagnostic  Dominance: Right  Left Main   The vessel was visualized by angiography. The vessel is angiographically normal.   Left Anterior Descending   The vessel exhibits minimal luminal irregularities. Second Diagonal [employer] 2nd Diag lesion 40% stenosed. .   Left Circumflex   The vessel exhibits minimal luminal irregularities. Right Coronary Artery   Mid RCA lesion 30% stenosed. .   Intervention    No interventions have been documented. CARDIAC PROCEDURE 02/14/2019 2/14/2019    Narrative Non obstructive epicardial coronary arteries with preserved LV function. Signed by: Miriam Landa MD   09/28/21    ECHO ADULT COMPLETE 09/29/2021 9/29/2021    Interpretation Summary  · LV: Estimated LVEF is 55 - 60%. Normal cavity size and systolic function (ejection fraction normal). Moderate concentric hypertrophy. Wall motion: normal. Mild (grade 1) left ventricular diastolic dysfunction. · LA: Left Atrium volume index is 29.97 mL/m2. · RV: Mildly dilated right ventricle. · RA: Mildly dilated right atrium. · AV: Aortic valve leaflet calcification present. Moderate aortic valve regurgitation is present. · MV: Mitral annular calcification. Mild mitral valve regurgitation is present.   · TV: Right Ventricular Arterial Pressure (RVSP) is 25 mmHg. Pulmonary hypertension not suggested by Doppler findings. · PV: Mild pulmonic valve regurgitation is present. Signed by: Terry Morgan MD on 9/29/2021  4:00 PM  Interpretation Summary 10/2022         Left Ventricle: Normal left ventricular systolic function with a visually estimated EF of 55 - 60%. Left ventricle size is normal. Increased wall thickness. Findings consistent with mild concentric hypertrophy. Normal wall motion. Normal diastolic function. Right Ventricle: Right ventricle is moderately dilated. Aortic Valve: Mild to moderate regurgitation with an eccentrically directed jet and may underestimate severity. Right Atrium: Right atrium is mildly dilated. Pulmonary Arteries: Pulmonary hypertension not present. The estimated PASP is 21 mmHg. Aorta: Mildly dilated aortic root. Ao Root diameter is 3.9 cm. Mildly dilated ascending aorta. Ao Ascending diameter is 3.7 cm. ASSESSMENT and PLAN    ICD-10-CM ICD-9-CM    1. Nonrheumatic aortic valve insufficiency  I35.1 424.1     Stable continue treatment monitor      2. Dyslipidemia  E78.5 272.4     Continue treatment plan with PCP      3. Essential hypertension  I10 401.9     Stable continue current treatment      4. Mild CAD  I25.10 414.00     Stable monitor        No orders of the defined types were placed in this encounter. Follow-up and Dispositions    Return in about 6 months (around 5/4/2023). current treatment plan is effective, no change in therapy  use of aspirin to prevent MI and TIA's discussed. Patient with risk factors seen for atypical chest pain.  ekg is abnormal with concerns for ischemia  No ischemia on stress test  Normal LV function with mild to moderate aortic regurgitation. No vegetation noted on the aortic valve. Underwent recent cardiac cath - mild CAD with normal LVEF.   Seen for f/u- bp stable on current meds  Has moderate aortic regurgitation with hypertensive heart disease. Recommend optimal blood pressure control. Follow-up in 6 months with repeat echo to assess AI. All questions answered. 11/2022  Stable cardiac status. Continue monitoring valvular disease. Mild to moderate aortic regurgitation on recent echo.   Normal ejection fraction right side remains in large

## 2022-12-20 ENCOUNTER — OFFICE VISIT (OUTPATIENT)
Dept: FAMILY MEDICINE CLINIC | Age: 72
End: 2022-12-20
Payer: MEDICARE

## 2022-12-20 VITALS
TEMPERATURE: 99.4 F | HEART RATE: 94 BPM | OXYGEN SATURATION: 98 % | WEIGHT: 210.25 LBS | RESPIRATION RATE: 14 BRPM | BODY MASS INDEX: 27.74 KG/M2 | DIASTOLIC BLOOD PRESSURE: 78 MMHG | SYSTOLIC BLOOD PRESSURE: 140 MMHG

## 2022-12-20 DIAGNOSIS — J01.90 ACUTE BACTERIAL RHINOSINUSITIS: Primary | ICD-10-CM

## 2022-12-20 DIAGNOSIS — B96.89 ACUTE BACTERIAL RHINOSINUSITIS: Primary | ICD-10-CM

## 2022-12-20 DIAGNOSIS — R05.1 ACUTE COUGH: ICD-10-CM

## 2022-12-20 DIAGNOSIS — C83.00 DIFFUSE WELL-DIFFERENTIATED LYMPHOCYTIC LYMPHOMA (HCC): ICD-10-CM

## 2022-12-20 LAB
FLUAV+FLUBV AG NOSE QL IA.RAPID: NEGATIVE
FLUAV+FLUBV AG NOSE QL IA.RAPID: NEGATIVE
S PYO AG THROAT QL: NEGATIVE
VALID INTERNAL CONTROL?: YES
VALID INTERNAL CONTROL?: YES

## 2022-12-20 PROCEDURE — 1123F ACP DISCUSS/DSCN MKR DOCD: CPT | Performed by: STUDENT IN AN ORGANIZED HEALTH CARE EDUCATION/TRAINING PROGRAM

## 2022-12-20 PROCEDURE — 3078F DIAST BP <80 MM HG: CPT | Performed by: STUDENT IN AN ORGANIZED HEALTH CARE EDUCATION/TRAINING PROGRAM

## 2022-12-20 PROCEDURE — 87880 STREP A ASSAY W/OPTIC: CPT | Performed by: STUDENT IN AN ORGANIZED HEALTH CARE EDUCATION/TRAINING PROGRAM

## 2022-12-20 PROCEDURE — 87804 INFLUENZA ASSAY W/OPTIC: CPT | Performed by: STUDENT IN AN ORGANIZED HEALTH CARE EDUCATION/TRAINING PROGRAM

## 2022-12-20 PROCEDURE — 99213 OFFICE O/P EST LOW 20 MIN: CPT | Performed by: STUDENT IN AN ORGANIZED HEALTH CARE EDUCATION/TRAINING PROGRAM

## 2022-12-20 PROCEDURE — 3074F SYST BP LT 130 MM HG: CPT | Performed by: STUDENT IN AN ORGANIZED HEALTH CARE EDUCATION/TRAINING PROGRAM

## 2022-12-20 RX ORDER — LANOLIN ALCOHOL/MO/W.PET/CERES
CREAM (GRAM) TOPICAL
COMMUNITY

## 2022-12-20 RX ORDER — AMOXICILLIN AND CLAVULANATE POTASSIUM 875; 125 MG/1; MG/1
1 TABLET, FILM COATED ORAL 2 TIMES DAILY
Qty: 14 TABLET | Refills: 0 | Status: SHIPPED | OUTPATIENT
Start: 2022-12-20 | End: 2022-12-27

## 2022-12-20 NOTE — PROGRESS NOTES
1. \"Have you been to the ER, urgent care clinic since your last visit? Hospitalized since your last visit? \" No    2. \"Have you seen or consulted any other health care providers outside of the [address] [place] [address] since your last visit? \" No     3. For patients aged 39-70: Has the patient had a colonoscopy / FIT/ Cologuard? Yes - no Care Gap present      If the patient is female:    4. For patients aged 41-77: Has the patient had a mammogram within the past 2 years? NA - based on age or sex      11. For patients aged 21-65: Has the patient had a pap smear? NA - based on age or sex    Chief Complaint   Patient presents with    Cold Symptoms     Started over the weekend. Having headaches, scratchy throat, coughing and some fatigue. No fever. Patient states he also has a rash on lower back, waist line and hips. Pt noticed it the weekend.

## 2022-12-20 NOTE — PROGRESS NOTES
Lito Newsome (: 1950) is a 67 y.o. male, established patient, here for evaluation of the following chief complaint(s):  Cold Symptoms (Started over the weekend. Having headaches, scratchy throat, coughing and some fatigue. No fever.)       Assessment/Plan:  1. Acute bacterial rhinosinusitis-strep and flu negative. Given underlying lymphoma, will cover with antibiotics for upper respiratory infection of possible bacterial origin. Contact office if not improving within 7 days. Continue symptomatic over-the-counter therapies. -     amoxicillin-clavulanate (AUGMENTIN) 875-125 mg per tablet; Take 1 Tablet by mouth two (2) times a day for 7 days. , Normal, Disp-14 Tablet, R-0  2. Acute cough  -     AMB POC RAPID STREP A  -     AMB POC RAPID INFLUENZA TEST  3. Diffuse well-differentiated lymphocytic lymphoma (HCC)-has not required treatment, is being monitored by oncology every 3 months with lab work. Continue follow-up with their office. Return if symptoms worsen or fail to improve. Subjective/Objective:  HPI    URI- Minimal symptoms Saturday, but then noticed worse on . Masks are required at [employer]. Been getting worse each day. Main symptoms of productive cough, headache, fatigue, scratchy throat, sinus congestion. No known sick contacts. Med attempted: cold tablets (dollar tree)    Rash- back, sides, hips. Pruritic. Rash started a few days before URI. Physical Exam  Blood pressure (!) 140/78, pulse 94, temperature 99.4 °F (37.4 °C), temperature source Tympanic, resp. rate 14, weight 210 lb 4 oz (95.4 kg), SpO2 98 %. Body mass index is 27.74 kg/m². Physical Exam  Constitutional:       Appearance: Normal appearance. HENT:      Head: Normocephalic and atraumatic. Right Ear: External ear normal.      Left Ear: External ear normal.   Eyes:      Extraocular Movements: Extraocular movements intact.       Conjunctiva/sclera: Conjunctivae normal.   Cardiovascular:      Rate and Rhythm: Normal rate and regular rhythm. Pulmonary:      Effort: Pulmonary effort is normal. No respiratory distress. Breath sounds: Normal breath sounds. Skin:     General: Skin is warm and dry. Comments: Faint maculopapular rash to thoracic and lumbar back. Neurological:      General: No focal deficit present. Mental Status: He is alert and oriented to person, place, and time. Mental status is at baseline. Medical History- Reviewed Social History- Reviewed Surgical History- Reviewed   Satya Whitaker has a past medical history of Aortic valve insufficiency, Arthritis, Lymphoma ([address] and Other ill-defined conditions(799.89). Satya Whitaker reports that he quit smoking about 43 years ago. His smoking use included cigarettes. He has never used smokeless tobacco. He reports that he does not drink alcohol and does not use drugs. Satya Whitaker has a past surgical history that includes hx appendectomy (1961); hx other surgical (Left, 1990's); hx other surgical (Right, 1980); and hx other surgical (Left, 2014).          Problem List- Reviewed   Satya Whitaker has History of colon polyps, Family history of colon cancer, Benign prostatic hyperplasia with incomplete bladder emptying, Lower urinary tract symptoms (LUTS), Essential hypertension, Polyp of colon, Dyslipidemia, Abnormal EKG, Chest pain, Peyronie's syndrome, Neuropathy, Diffuse well-differentiated lymphocytic lymphoma ([facility] [address] Nuclear senile cataract, and Ocular hypertension on their problem list.       Current Outpatient Medications   Medication Instructions    acetaminophen (TYLENOL) 1,000 mg, EVERY 6 HOURS AS NEEDED    alfuzosin SR (UROXATRAL) 10 mg SR tablet TAKE 1 TABLET BY MOUTH  DAILY AFTER DINNER    amLODIPine (NORVASC) 10 mg tablet TAKE 1 TABLET BY MOUTH  DAILY    amoxicillin-clavulanate (AUGMENTIN) 875-125 mg per tablet 1 Tablet, Oral, 2 TIMES DAILY    aspirin delayed-release 81 mg, Oral, DAILY    atorvastatin (LIPITOR) 40 mg tablet TAKE 1 TABLET BY MOUTH  DAILY benazepriL (LOTENSIN) 40 mg tablet TAKE 1 TABLET BY MOUTH  DAILY    cholecalciferol (VITAMIN D3) 1,000 Units, Oral, DAILY    ferrous sulfate (Iron) 325 mg (65 mg iron) tablet Oral, DAILY BEFORE BREAKFAST    gabapentin (NEURONTIN) 400 mg, Oral, 3 TIMES DAILY    hydroCHLOROthiazide (HYDRODIURIL) 25 mg tablet TAKE 1 TABLET BY MOUTH  DAILY    potassium chloride (KLOR-CON M10) 10 mEq tablet TAKE 1 TABLET BY MOUTH  DAILY    therapeutic multivitamin (THERAGRAN) tablet 1 Tablet, Oral, DAILY    travoprost (TRAVATAN Z) 0.004 % ophthalmic solution 1 Drop, EVERY BEDTIME    vitamin e (E GEMS) 1,000 Units, Oral, DAILY           An electronic signature was used to authenticate this note.   -- Zack Hatfield MD

## 2023-03-06 ENCOUNTER — TELEPHONE (OUTPATIENT)
Facility: CLINIC | Age: 73
End: 2023-03-06

## 2023-03-06 DIAGNOSIS — G62.9 POLYNEUROPATHY, UNSPECIFIED: Primary | ICD-10-CM

## 2023-03-06 NOTE — TELEPHONE ENCOUNTER
Pt called to inform Belem Rangel for all refills going forward practice would have to call [phone]. Pt would also like a refill on the medication GABAPENTIN. Refill Request listed below. Please review and advise as soon as possible.          This patient contacted office for the following prescriptions to be filled:    Medication requested : gabapentin (NEURONTIN) 400 MG capsule (3 times a day, 90 day supply)  PCP: [address] or Print:   Mail order or Local pharmacy    Scheduled appointment if not seen by current providers in office:  LOV: 12/20/22 UPCOMING:3/7/23

## 2023-03-07 ENCOUNTER — OFFICE VISIT (OUTPATIENT)
Facility: CLINIC | Age: 73
End: 2023-03-07

## 2023-03-07 VITALS
RESPIRATION RATE: 14 BRPM | HEART RATE: 75 BPM | BODY MASS INDEX: 25.86 KG/M2 | DIASTOLIC BLOOD PRESSURE: 84 MMHG | SYSTOLIC BLOOD PRESSURE: 140 MMHG | WEIGHT: 196 LBS | TEMPERATURE: 97.6 F | OXYGEN SATURATION: 95 %

## 2023-03-07 DIAGNOSIS — Z09 HOSPITAL DISCHARGE FOLLOW-UP: Primary | ICD-10-CM

## 2023-03-07 DIAGNOSIS — R22.2 SUPRACLAVICULAR MASS: ICD-10-CM

## 2023-03-07 DIAGNOSIS — Z48.89 ENCOUNTER FOR POSTOPERATIVE WOUND CHECK: ICD-10-CM

## 2023-03-07 DIAGNOSIS — S32.000S LUMBAR COMPRESSION FRACTURE, SEQUELA: ICD-10-CM
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
## 2023-03-07 RX ORDER — KETOCONAZOLE 20 MG/ML
SHAMPOO TOPICAL
COMMUNITY
Start: 2023-01-30

## 2023-03-07 RX ORDER — NALOXONE HYDROCHLORIDE 4 MG/.1ML
SPRAY NASAL
COMMUNITY
Start: 2023-02-24

## 2023-03-07 RX ORDER — AMOXICILLIN 250 MG
1 CAPSULE ORAL 2 TIMES DAILY
COMMUNITY
Start: 2023-02-24

## 2023-03-07 RX ORDER — OXYCODONE HYDROCHLORIDE 5 MG/1
5 TABLET ORAL EVERY 6 HOURS PRN
Qty: 21 TABLET | Refills: 0 | Status: SHIPPED | OUTPATIENT
Start: 2023-03-07 | End: 2023-03-14

## 2023-03-07 RX ORDER — OXYCODONE HYDROCHLORIDE 5 MG/1
TABLET ORAL EVERY 6 HOURS PRN
COMMUNITY
Start: 2023-02-24 | End: 2023-03-07 | Stop reason: SDUPTHER

## 2023-03-07 RX ORDER — NAPROXEN 500 MG/1
TABLET ORAL
COMMUNITY
Start: 2023-02-24

## 2023-03-07 RX ORDER — LIDOCAINE 4 G/G
1 PATCH TOPICAL EVERY 12 HOURS
COMMUNITY
Start: 2023-02-24

## 2023-03-07 RX ORDER — POLYETHYLENE GLYCOL 3350 17 G/17G
POWDER, FOR SOLUTION ORAL 2 TIMES DAILY
COMMUNITY
Start: 2023-02-24

## 2023-03-07 SDOH — ECONOMIC STABILITY: FOOD INSECURITY: WITHIN THE PAST 12 MONTHS, YOU WORRIED THAT YOUR FOOD WOULD RUN OUT BEFORE YOU GOT MONEY TO BUY MORE.: NEVER TRUE

## 2023-03-07 SDOH — ECONOMIC STABILITY: HOUSING INSECURITY
IN THE LAST 12 MONTHS, WAS THERE A TIME WHEN YOU DID NOT HAVE A STEADY PLACE TO SLEEP OR SLEPT IN A SHELTER (INCLUDING NOW)?: NO

## 2023-03-07 SDOH — ECONOMIC STABILITY: INCOME INSECURITY: HOW HARD IS IT FOR YOU TO PAY FOR THE VERY BASICS LIKE FOOD, HOUSING, MEDICAL CARE, AND HEATING?: NOT VERY HARD

## 2023-03-07 SDOH — ECONOMIC STABILITY: FOOD INSECURITY: WITHIN THE PAST 12 MONTHS, THE FOOD YOU BOUGHT JUST DIDN'T LAST AND YOU DIDN'T HAVE MONEY TO GET MORE.: NEVER TRUE

## 2023-03-07 ASSESSMENT — PATIENT HEALTH QUESTIONNAIRE - PHQ9
SUM OF ALL RESPONSES TO PHQ QUESTIONS 1-9: 0
1. LITTLE INTEREST OR PLEASURE IN DOING THINGS: 0
2. FEELING DOWN, DEPRESSED OR HOPELESS: 0
SUM OF ALL RESPONSES TO PHQ9 QUESTIONS 1 & 2: 0
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
## 2023-03-08 RX ORDER — HYDROCHLOROTHIAZIDE 25 MG/1
TABLET ORAL
Qty: 90 TABLET | Refills: 3 | Status: SHIPPED | OUTPATIENT
Start: 2023-03-08

## 2023-03-13 RX ORDER — GABAPENTIN 400 MG/1
400 CAPSULE ORAL 3 TIMES DAILY
Qty: 270 CAPSULE | Refills: 1 | Status: SHIPPED | OUTPATIENT
Start: 2023-03-13 | End: 2023-09-09

## 2023-03-13 NOTE — TELEPHONE ENCOUNTER
Patient called today regarding his message from 03/06/23 for  gabapentin (NEURONTIN) 400 MG capsule (3 times a day, 90 day supply) Patient States he spoke with [facility] directly and stated you can call them at [phone] to request a verbal order. Please review and advise.

## 2023-03-23 ENCOUNTER — TELEPHONE (OUTPATIENT)
Facility: CLINIC | Age: 73
End: 2023-03-23

## 2023-03-23 DIAGNOSIS — I10 ESSENTIAL HYPERTENSION: ICD-10-CM

## 2023-03-23 DIAGNOSIS — E78.5 DYSLIPIDEMIA: Primary | ICD-10-CM

## 2023-03-23 NOTE — TELEPHONE ENCOUNTER
[facility]  faxed over request for the following prescriptions to be filled: 2nd Attempt per pharmacy.     Medication requested : atorvastatin (LIPITOR) 40 MG tablet, amLODIPine (NORVASC) 10 MG tablet , and benazepril (LOTENSIN) 40 MG tablet         PCP: Dr. Paddy Benson or Print: [facility]  Mail order or Local pharmacy [phone]    Scheduled appointment if not seen by current providers in office: 03/07/23 Upcoming 03/27/23

## 2023-03-24 RX ORDER — POTASSIUM CHLORIDE 750 MG/1
10 TABLET, EXTENDED RELEASE ORAL DAILY
Qty: 90 TABLET | Refills: 1 | Status: SHIPPED | OUTPATIENT
Start: 2023-03-24

## 2023-03-24 RX ORDER — BENAZEPRIL HYDROCHLORIDE 40 MG/1
40 TABLET, FILM COATED ORAL DAILY
Qty: 90 TABLET | Refills: 3 | Status: SHIPPED | OUTPATIENT
Start: 2023-03-24

## 2023-03-24 RX ORDER — AMLODIPINE BESYLATE 10 MG/1
10 TABLET ORAL DAILY
Qty: 90 TABLET | Refills: 3 | Status: SHIPPED | OUTPATIENT
Start: 2023-03-24

## 2023-03-24 RX ORDER — ATORVASTATIN CALCIUM 40 MG/1
40 TABLET, FILM COATED ORAL DAILY
Qty: 90 TABLET | Refills: 3 | Status: SHIPPED | OUTPATIENT
Start: 2023-03-24

## 2023-04-20 ENCOUNTER — OFFICE VISIT (OUTPATIENT)
Facility: CLINIC | Age: 73
End: 2023-04-20
Payer: MEDICARE

## 2023-04-20 VITALS
DIASTOLIC BLOOD PRESSURE: 64 MMHG | BODY MASS INDEX: 25.73 KG/M2 | RESPIRATION RATE: 16 BRPM | WEIGHT: 195 LBS | OXYGEN SATURATION: 97 % | HEART RATE: 69 BPM | TEMPERATURE: 97.9 F | SYSTOLIC BLOOD PRESSURE: 130 MMHG

## 2023-04-20 DIAGNOSIS — H81.10 BENIGN PAROXYSMAL POSITIONAL VERTIGO, UNSPECIFIED LATERALITY: Primary | ICD-10-CM

## 2023-04-20 DIAGNOSIS — Z86.718 HISTORY OF DVT (DEEP VEIN THROMBOSIS): ICD-10-CM

## 2023-04-20 DIAGNOSIS — C83.00 SMALL B-CELL LYMPHOMA, UNSPECIFIED BODY REGION (HCC): ICD-10-CM

## 2023-04-20 DIAGNOSIS — M70.21 OLECRANON BURSITIS OF RIGHT ELBOW: ICD-10-CM

## 2023-04-20 PROBLEM — I82.629: Status: RESOLVED | Noted: 2023-04-20 | Resolved: 2023-04-20

## 2023-04-20 PROBLEM — I82.629: Status: ACTIVE | Noted: 2023-04-20

## 2023-04-20 PROCEDURE — 3078F DIAST BP <80 MM HG: CPT | Performed by: STUDENT IN AN ORGANIZED HEALTH CARE EDUCATION/TRAINING PROGRAM

## 2023-04-20 PROCEDURE — 1123F ACP DISCUSS/DSCN MKR DOCD: CPT | Performed by: STUDENT IN AN ORGANIZED HEALTH CARE EDUCATION/TRAINING PROGRAM

## 2023-04-20 PROCEDURE — 3074F SYST BP LT 130 MM HG: CPT | Performed by: STUDENT IN AN ORGANIZED HEALTH CARE EDUCATION/TRAINING PROGRAM

## 2023-04-20 PROCEDURE — 99214 OFFICE O/P EST MOD 30 MIN: CPT | Performed by: STUDENT IN AN ORGANIZED HEALTH CARE EDUCATION/TRAINING PROGRAM

## 2023-04-20 NOTE — PROGRESS NOTES
Chief Complaint   Patient presents with    Dizziness     Beginning of the week really badly today not feeling it at all. Pt checked Bp Monday afternoon diastolic over 412. Right shoulder aching and knot on right elbow. 1. \"Have you been to the ER, urgent care clinic since your last visit? Hospitalized since your last visit? \" No    2. \"Have you seen or consulted any other health care providers outside of the [address] [place] [address] since your last visit? \" Yes [facility], [facility]. 3. For patients aged 39-70: Has the patient had a colonoscopy / FIT/ Cologuard? Yes - no Care Gap present      If the patient is female:    4. For patients aged 41-77: Has the patient had a mammogram within the past 2 years? NA - based on age or sex      11. For patients aged 21-65: Has the patient had a pap smear?  NA - based on age or sex

## 2023-04-20 NOTE — PROGRESS NOTES
Ricardo Rodriguez (: 1950) is a 75 y.o. male, established patient, here for evaluation of the following chief complaint(s):  Dizziness (Beginning of the week really badly today not feeling it at all. Pt checked Bp Monday afternoon diastolic over 706./ETDTY shoulder aching and knot on right elbow. )       Assessment/Plan:  1. Benign paroxysmal positional vertigo, unspecified laterality- Overall appears episode has resolved. Discussed pathophys of illness and possible recurrence. Printed educational info and Epleys maneuver  2. Olecranon bursitis of right elbow- Conservative symptomatic therapy. Avoiding pressure to elbow. Shield from trauma or continued pressure. If persistent over the next month, can work in conjunction with OT for a fitted orthosis and fluid aspiration by myself. 3. Small B-cell lymphoma, unspecified body region ([address]- Stable without signs today requiring a change in plans. Reviewed latest CBC and BMP. Follow up with H/O. Enlarged supraclavicular LN has greatly decreased. 4. History of DVT (deep vein thrombosis)- Updated chart, provoked. Not on long term AC      Return in about 3 months (around 2023) for routine check up. Subjective/Objective:  HPI    Dizziness- Started 4 days ago. Woke up in the middle of the night and had significant dizziness. \"Bouncing off the walls walking down the [place]is. Present still in the morning and the following few days. Most noticeable when getting up out of bed. Denies room spinning sensation, was unsteady on feet. Denies lightheadedness. On the worse day, didn't feel symptoms at rest but if moved head would have return of symptoms. Same with laying down for bed, when moving head it would return symptoms.   - Has been taking antihypertensives as normal.     Physical Exam  Blood pressure 130/64, pulse 69, temperature 97.9 °F (36.6 °C), temperature source Tympanic, resp. rate 16, weight 195 lb (88.5 kg), SpO2 97 %.  Body mass index is 25.73

## 2023-05-03 ENCOUNTER — HOSPITAL ENCOUNTER (OUTPATIENT)
Facility: HOSPITAL | Age: 73
Setting detail: RECURRING SERIES
Discharge: HOME | End: 2023-05-06
Payer: MEDICARE

## 2023-05-03 PROCEDURE — 97110 THERAPEUTIC EXERCISES: CPT

## 2023-05-03 PROCEDURE — 97162 PT EVAL MOD COMPLEX 30 MIN: CPT

## 2023-05-03 PROCEDURE — 97530 THERAPEUTIC ACTIVITIES: CPT
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
## 2023-05-17 ENCOUNTER — HOSPITAL ENCOUNTER (OUTPATIENT)
Facility: HOSPITAL | Age: 73
Setting detail: RECURRING SERIES
Discharge: HOME | End: 2023-05-20
Payer: MEDICARE

## 2023-05-17 PROCEDURE — 97016 VASOPNEUMATIC DEVICE THERAPY: CPT

## 2023-05-17 PROCEDURE — 97110 THERAPEUTIC EXERCISES: CPT

## 2023-05-17 PROCEDURE — 97140 MANUAL THERAPY 1/> REGIONS: CPT

## 2023-05-17 PROCEDURE — 97530 THERAPEUTIC ACTIVITIES: CPT

## 2023-05-17 PROCEDURE — 97112 NEUROMUSCULAR REEDUCATION: CPT

## 2023-05-17 NOTE — PROGRESS NOTES
goals:  []  See Progress Note/Recertification    Short Term Goals: To be accomplished in 4 weeks: 1. Patient will report compliance with HEP 1x/day to aid in rehabilitation program.  Status at IE: Pt was given HEP and appeared to understand. Current: 5/17/23 reports compliance     2. Patient will 5-10 degrees inc in limited shoulder ROM in all planes so pt can reach above head. Status at IE:  (Degrees) AROM Left Right   Flexion: seated: 140 137   ABDuction: seated:  110 107 pain   ER @ 90 Degrees 90 87   HGIR 90 65   Horizontal abduction NT NT      Current: 5/17/23 HGIR: right: 61 Left:80     Long Term Goals: To be accomplished in 12 weeks: 1. Patient will increase limited UE strength by at least . 5 grade MMT throughout to aid in completion of ADLs. Status at IE:  MMT Left (1-5) Right (1-5) Pain   Flexors 4 4+ [x] Yes   [] No   Abductors 4 4 [x] Yes   [] No   Supraspinatus 4 4 [x] Yes   [] No   Elbow Flexion 4 4- [] Yes   [] No   Elbow Extension 4 4- [] Yes   [] No   Current: Same as IE     2. Patient will report pain no greater than 4/10 throughout entire day to aid in completion of ADLs. Status at IE:Pt rates pain _8-9__/10 max (in the last 48 hrs) _1__/10 min (in the last 48 hrs) _4-5__/10 currently at rest.  Current: 5/17/23 rates pain 2-3/10     3. Patient will increase limited elbow and wrist ROM by 10-15 degrees all planes so pt can reach above head. Status at IE:     Left  right   Elbow flexion 145 140   Elbow extension Lacking 9 Lacking 20   supination 95 70   Pronation  90 65   Wrist flexion 65 45   Wrist extension 60 8   Current: Same as IE     4. Patient will improve FOTO score to 68 points to demonstrate improvement in functional status.   Status at IE:50  Current: 5/17/23 Same as IE will perform on 5th vist     *FOTO score is an established functional score where 100 = no disability*    PLAN  Yes  Continue plan of care  []  Upgrade activities as tolerated  []  Discharge due to :  []

## 2023-05-19 ENCOUNTER — HOSPITAL ENCOUNTER (OUTPATIENT)
Facility: HOSPITAL | Age: 73
Setting detail: RECURRING SERIES
Discharge: HOME | End: 2023-05-22
Payer: MEDICARE

## 2023-05-19 PROCEDURE — 97016 VASOPNEUMATIC DEVICE THERAPY: CPT

## 2023-05-19 PROCEDURE — 97530 THERAPEUTIC ACTIVITIES: CPT

## 2023-05-19 PROCEDURE — 97140 MANUAL THERAPY 1/> REGIONS: CPT

## 2023-05-19 PROCEDURE — 97110 THERAPEUTIC EXERCISES: CPT

## 2023-05-25 ENCOUNTER — HOSPITAL ENCOUNTER (OUTPATIENT)
Facility: HOSPITAL | Age: 73
Setting detail: RECURRING SERIES
Discharge: HOME | End: 2023-05-28
Payer: MEDICARE

## 2023-05-25 PROCEDURE — 97530 THERAPEUTIC ACTIVITIES: CPT

## 2023-05-25 PROCEDURE — 97110 THERAPEUTIC EXERCISES: CPT

## 2023-05-25 PROCEDURE — 97112 NEUROMUSCULAR REEDUCATION: CPT

## 2023-05-25 PROCEDURE — 97016 VASOPNEUMATIC DEVICE THERAPY: CPT

## 2023-05-26 ENCOUNTER — HOSPITAL ENCOUNTER (OUTPATIENT)
Facility: HOSPITAL | Age: 73
Setting detail: RECURRING SERIES
Discharge: HOME | End: 2023-05-29
Payer: MEDICARE

## 2023-05-26 PROCEDURE — 97016 VASOPNEUMATIC DEVICE THERAPY: CPT

## 2023-05-26 PROCEDURE — 97112 NEUROMUSCULAR REEDUCATION: CPT

## 2023-05-26 PROCEDURE — 97140 MANUAL THERAPY 1/> REGIONS: CPT

## 2023-06-01 ENCOUNTER — APPOINTMENT (OUTPATIENT)
Facility: HOSPITAL | Age: 73
End: 2023-06-01
Payer: MEDICARE

## 2023-06-02 ENCOUNTER — HOSPITAL ENCOUNTER (OUTPATIENT)
Facility: HOSPITAL | Age: 73
Setting detail: RECURRING SERIES
Discharge: HOME | End: 2023-06-05
Payer: MEDICARE

## 2023-06-02 PROCEDURE — 97110 THERAPEUTIC EXERCISES: CPT

## 2023-06-02 PROCEDURE — 97016 VASOPNEUMATIC DEVICE THERAPY: CPT

## 2023-06-02 PROCEDURE — 97140 MANUAL THERAPY 1/> REGIONS: CPT

## 2023-06-02 PROCEDURE — 97112 NEUROMUSCULAR REEDUCATION: CPT

## 2023-06-09 ENCOUNTER — APPOINTMENT (OUTPATIENT)
Facility: HOSPITAL | Age: 73
End: 2023-06-09
Payer: MEDICARE

## 2023-06-15 ENCOUNTER — APPOINTMENT (OUTPATIENT)
Facility: HOSPITAL | Age: 73
End: 2023-06-15
Payer: MEDICARE

## 2023-06-15 RX ORDER — POTASSIUM CHLORIDE 750 MG/1
10 TABLET, EXTENDED RELEASE ORAL DAILY
Qty: 90 TABLET | Refills: 3 | Status: SHIPPED | OUTPATIENT
Start: 2023-06-15

## 2023-06-15 NOTE — TELEPHONE ENCOUNTER
This pharmacy faxed over request for the following prescriptions to be filled:    Medication requested :   Requested Prescriptions     Pending Prescriptions Disp Refills    potassium chloride (KLOR-CON M) 10 MEQ extended release tablet [Pharmacy Med Name: Potassium Chloride Maria G ER 10 MEQ Oral Tablet Extended Release] 100 tablet 2     Sig: TAKE 1 TABLET BY MOUTH DAILY      PCP: Dr. Dolores Kearney or Print: [facility]  Mail order or Local pharmacy    Scheduled appointment if not seen by current providers in office: [address] [place] [address] Dr 4/20/23, next appt 7/20/23

## 2023-06-16 ENCOUNTER — HOSPITAL ENCOUNTER (OUTPATIENT)
Facility: HOSPITAL | Age: 73
Setting detail: RECURRING SERIES
Discharge: HOME | End: 2023-06-19
Payer: MEDICARE

## 2023-06-16 PROCEDURE — 97016 VASOPNEUMATIC DEVICE THERAPY: CPT

## 2023-06-16 PROCEDURE — 97112 NEUROMUSCULAR REEDUCATION: CPT

## 2023-06-16 PROCEDURE — 97110 THERAPEUTIC EXERCISES: CPT

## 2023-06-16 PROCEDURE — 97530 THERAPEUTIC ACTIVITIES: CPT

## 2023-06-20 RX ORDER — POTASSIUM CHLORIDE 750 MG/1
10 TABLET, EXTENDED RELEASE ORAL DAILY
Qty: 100 TABLET | Refills: 2 | OUTPATIENT
Start: 2023-06-20

## 2023-06-22 ENCOUNTER — TELEPHONE (OUTPATIENT)
Facility: CLINIC | Age: 73
End: 2023-06-22

## 2023-06-22 ENCOUNTER — APPOINTMENT (OUTPATIENT)
Facility: HOSPITAL | Age: 73
End: 2023-06-22
Payer: MEDICARE

## 2023-06-22 NOTE — TELEPHONE ENCOUNTER
Sorin Irizarry called to [facility] a hospital follow up from Sorin Irizarry, pt discharged 6/22, inpt for afib.

## 2023-06-23 ENCOUNTER — TELEPHONE (OUTPATIENT)
Facility: CLINIC | Age: 73
End: 2023-06-23

## 2023-06-23 ENCOUNTER — HOSPITAL ENCOUNTER (OUTPATIENT)
Facility: HOSPITAL | Age: 73
Setting detail: RECURRING SERIES
Discharge: HOME | End: 2023-06-26
Payer: MEDICARE

## 2023-06-23 PROCEDURE — 97112 NEUROMUSCULAR REEDUCATION: CPT

## 2023-06-23 PROCEDURE — 97110 THERAPEUTIC EXERCISES: CPT

## 2023-06-23 PROCEDURE — 97140 MANUAL THERAPY 1/> REGIONS: CPT

## 2023-06-23 PROCEDURE — 97016 VASOPNEUMATIC DEVICE THERAPY: CPT

## 2023-06-23 NOTE — TELEPHONE ENCOUNTER
Care Transitions Initial Follow Up Call    Call within 2 business days of discharge: Yes     Patient: Ricardo Rodriguez Patient : 1950 MRN: [id number]    [unfilled]    RARS: No data recorded     Spoke with: Mrs. Carlos Saenz    Discharge department/facility: [facility]    Non-face-to-face services provided: scheduled appointment with PCP for       Follow Up  Future Appointments   Date Time Provider Sumaya Medrano   2023  8:40 AM Paramjit Richardson PT [facility]   2023  3:00 PM Abeba Judge MD [facility] BS AMB   2023  9:20 AM WILLY AguilarHPJEREMY [facility]   2023 10:30 AM Abeba Judge MD [facility] BS RINA       [place], [place]

## 2023-06-26 ENCOUNTER — HOSPITAL ENCOUNTER (OUTPATIENT)
Facility: HOSPITAL | Age: 73
Setting detail: RECURRING SERIES
Discharge: HOME | End: 2023-06-29
Payer: MEDICARE

## 2023-06-26 ENCOUNTER — OFFICE VISIT (OUTPATIENT)
Facility: CLINIC | Age: 73
End: 2023-06-26

## 2023-06-26 VITALS
DIASTOLIC BLOOD PRESSURE: 62 MMHG | HEART RATE: 70 BPM | BODY MASS INDEX: 24.94 KG/M2 | SYSTOLIC BLOOD PRESSURE: 128 MMHG | TEMPERATURE: 97.6 F | WEIGHT: 189 LBS | OXYGEN SATURATION: 97 % | RESPIRATION RATE: 14 BRPM

## 2023-06-26 DIAGNOSIS — I44.7 LEFT BUNDLE BRANCH BLOCK: ICD-10-CM

## 2023-06-26 DIAGNOSIS — Z09 HOSPITAL DISCHARGE FOLLOW-UP: ICD-10-CM

## 2023-06-26 DIAGNOSIS — I48.0 PAROXYSMAL ATRIAL FIBRILLATION (HCC): Primary | ICD-10-CM

## 2023-06-26 PROCEDURE — 97110 THERAPEUTIC EXERCISES: CPT

## 2023-06-26 PROCEDURE — 97016 VASOPNEUMATIC DEVICE THERAPY: CPT

## 2023-06-26 PROCEDURE — 97140 MANUAL THERAPY 1/> REGIONS: CPT

## 2023-06-26 RX ORDER — CHLORAL HYDRATE 500 MG
1000 CAPSULE ORAL DAILY
COMMUNITY

## 2023-06-28 ENCOUNTER — OFFICE VISIT (OUTPATIENT)
Age: 73
End: 2023-06-28
Payer: MEDICARE

## 2023-06-28 VITALS
DIASTOLIC BLOOD PRESSURE: 61 MMHG | WEIGHT: 190 LBS | HEIGHT: 73 IN | SYSTOLIC BLOOD PRESSURE: 149 MMHG | BODY MASS INDEX: 25.18 KG/M2 | HEART RATE: 60 BPM | OXYGEN SATURATION: 98 %

## 2023-06-28 DIAGNOSIS — I10 ESSENTIAL (PRIMARY) HYPERTENSION: ICD-10-CM

## 2023-06-28 DIAGNOSIS — E78.5 HYPERLIPIDEMIA, UNSPECIFIED HYPERLIPIDEMIA TYPE: ICD-10-CM

## 2023-06-28 DIAGNOSIS — I35.1 NONRHEUMATIC AORTIC (VALVE) INSUFFICIENCY: ICD-10-CM

## 2023-06-28 DIAGNOSIS — I48.0 PAROXYSMAL ATRIAL FIBRILLATION (HCC): Primary | ICD-10-CM

## 2023-06-28 DIAGNOSIS — I25.10 ATHEROSCLEROSIS OF NATIVE CORONARY ARTERY OF NATIVE HEART WITHOUT ANGINA PECTORIS: ICD-10-CM

## 2023-06-28 DIAGNOSIS — Z79.01 CURRENT USE OF LONG TERM ANTICOAGULATION: ICD-10-CM

## 2023-06-28 PROCEDURE — 1123F ACP DISCUSS/DSCN MKR DOCD: CPT | Performed by: INTERNAL MEDICINE

## 2023-06-28 PROCEDURE — 99214 OFFICE O/P EST MOD 30 MIN: CPT | Performed by: INTERNAL MEDICINE

## 2023-06-28 PROCEDURE — 3078F DIAST BP <80 MM HG: CPT | Performed by: INTERNAL MEDICINE

## 2023-06-28 PROCEDURE — 3077F SYST BP >= 140 MM HG: CPT | Performed by: INTERNAL MEDICINE

## 2023-06-28 ASSESSMENT — PATIENT HEALTH QUESTIONNAIRE - PHQ9
SUM OF ALL RESPONSES TO PHQ QUESTIONS 1-9: 0
DEPRESSION UNABLE TO ASSESS: FUNCTIONAL CAPACITY MOTIVATION LIMITS ACCURACY
SUM OF ALL RESPONSES TO PHQ QUESTIONS 1-9: 0
SUM OF ALL RESPONSES TO PHQ9 QUESTIONS 1 & 2: 0
1. LITTLE INTEREST OR PLEASURE IN DOING THINGS: 0
2. FEELING DOWN, DEPRESSED OR HOPELESS: 0

## 2023-06-29 ENCOUNTER — APPOINTMENT (OUTPATIENT)
Facility: HOSPITAL | Age: 73
End: 2023-06-29
Payer: MEDICARE

## 2023-06-30 ENCOUNTER — APPOINTMENT (OUTPATIENT)
Facility: HOSPITAL | Age: 73
End: 2023-06-30
Payer: MEDICARE

## 2023-07-06 ENCOUNTER — APPOINTMENT (OUTPATIENT)
Facility: HOSPITAL | Age: 73
End: 2023-07-06
Payer: MEDICARE

## 2023-07-07 ENCOUNTER — HOSPITAL ENCOUNTER (OUTPATIENT)
Facility: HOSPITAL | Age: 73
Setting detail: RECURRING SERIES
Discharge: HOME | End: 2023-07-10
Payer: MEDICARE

## 2023-07-07 PROCEDURE — 97016 VASOPNEUMATIC DEVICE THERAPY: CPT

## 2023-07-07 PROCEDURE — 97112 NEUROMUSCULAR REEDUCATION: CPT

## 2023-07-07 PROCEDURE — 97530 THERAPEUTIC ACTIVITIES: CPT

## 2023-07-07 PROCEDURE — 97110 THERAPEUTIC EXERCISES: CPT

## 2023-07-14 ENCOUNTER — APPOINTMENT (OUTPATIENT)
Facility: HOSPITAL | Age: 73
End: 2023-07-14
Payer: MEDICARE

## 2023-07-21 ENCOUNTER — APPOINTMENT (OUTPATIENT)
Facility: HOSPITAL | Age: 73
End: 2023-07-21
Payer: MEDICARE

## 2023-07-24 ENCOUNTER — OFFICE VISIT (OUTPATIENT)
Facility: CLINIC | Age: 73
End: 2023-07-24
Payer: MEDICARE

## 2023-07-24 VITALS
RESPIRATION RATE: 16 BRPM | WEIGHT: 191 LBS | TEMPERATURE: 96.6 F | DIASTOLIC BLOOD PRESSURE: 82 MMHG | HEART RATE: 74 BPM | BODY MASS INDEX: 25.2 KG/M2 | SYSTOLIC BLOOD PRESSURE: 140 MMHG | OXYGEN SATURATION: 96 %

## 2023-07-24 DIAGNOSIS — Z71.89 ACP (ADVANCE CARE PLANNING): ICD-10-CM

## 2023-07-24 DIAGNOSIS — Z00.00 MEDICARE ANNUAL WELLNESS VISIT, SUBSEQUENT: Primary | ICD-10-CM

## 2023-07-24 DIAGNOSIS — R53.83 LETHARGY: ICD-10-CM

## 2023-07-24 DIAGNOSIS — G47.9 DISORDERED SLEEP: ICD-10-CM

## 2023-07-24 DIAGNOSIS — I48.0 PAROXYSMAL ATRIAL FIBRILLATION (HCC): ICD-10-CM

## 2023-07-24 PROCEDURE — G0439 PPPS, SUBSEQ VISIT: HCPCS | Performed by: STUDENT IN AN ORGANIZED HEALTH CARE EDUCATION/TRAINING PROGRAM

## 2023-07-24 PROCEDURE — 1123F ACP DISCUSS/DSCN MKR DOCD: CPT | Performed by: STUDENT IN AN ORGANIZED HEALTH CARE EDUCATION/TRAINING PROGRAM

## 2023-07-24 PROCEDURE — 99214 OFFICE O/P EST MOD 30 MIN: CPT | Performed by: STUDENT IN AN ORGANIZED HEALTH CARE EDUCATION/TRAINING PROGRAM

## 2023-07-24 PROCEDURE — 3079F DIAST BP 80-89 MM HG: CPT | Performed by: STUDENT IN AN ORGANIZED HEALTH CARE EDUCATION/TRAINING PROGRAM

## 2023-07-24 PROCEDURE — 3077F SYST BP >= 140 MM HG: CPT | Performed by: STUDENT IN AN ORGANIZED HEALTH CARE EDUCATION/TRAINING PROGRAM

## 2023-07-24 PROCEDURE — 99497 ADVNCD CARE PLAN 30 MIN: CPT | Performed by: STUDENT IN AN ORGANIZED HEALTH CARE EDUCATION/TRAINING PROGRAM

## 2023-07-24 ASSESSMENT — LIFESTYLE VARIABLES
HOW MANY STANDARD DRINKS CONTAINING ALCOHOL DO YOU HAVE ON A TYPICAL DAY: PATIENT DOES NOT DRINK
HOW OFTEN DO YOU HAVE A DRINK CONTAINING ALCOHOL: NEVER

## 2023-07-24 ASSESSMENT — PATIENT HEALTH QUESTIONNAIRE - PHQ9
1. LITTLE INTEREST OR PLEASURE IN DOING THINGS: 0
SUM OF ALL RESPONSES TO PHQ QUESTIONS 1-9: 0
SUM OF ALL RESPONSES TO PHQ QUESTIONS 1-9: 0
SUM OF ALL RESPONSES TO PHQ9 QUESTIONS 1 & 2: 0
SUM OF ALL RESPONSES TO PHQ QUESTIONS 1-9: 0
SUM OF ALL RESPONSES TO PHQ QUESTIONS 1-9: 0
2. FEELING DOWN, DEPRESSED OR HOPELESS: 0

## 2023-07-24 NOTE — PROGRESS NOTES
Chief Complaint   Patient presents with    Follow-up     Fatigue, Pt states his medication has changed and he can sit down for bout 5 or 10 mons and he nodes out. 1. \"Have you been to the ER, urgent care clinic since your last visit? Hospitalized since your last visit? \" No    2. \"Have you seen or consulted any other health care providers outside of the [address] [place] [address] since your last visit? \" No     3. For patients aged 43-73: Has the patient had a colonoscopy / FIT/ Cologuard?  Yes - no Care Gap present

## 2023-07-24 NOTE — PROGRESS NOTES
Medicare Annual Wellness Visit    Raleigh Moss is here for Medicare AWV    Assessment & Plan   Medicare annual wellness visit, subsequent  ACP (advance care planning)  -     OR Advanced Care Planning (16-30 minutes) [94367]  Lethargy-would recommend focusing on quality of sleep first.  Gabapentin and metoprolol may also be contributing to new lethargy, but there risks may be worth the benefits. We will try to avoid evening naps and sleep in 1 dedicated block at night. Nocturia is still causing problems, recommend following back up with urology for ablative treatment. Also can work on exercise tolerance with increasing exercise weekly, like using the track by his house. Paroxysmal atrial fibrillation (HCC)-rate well controlled on metoprolol. On DOAC. No acute symptoms requiring change of plans today. Disordered sleep-as above    Recommendations for Preventive Services Due: see orders and patient instructions/AVS.  Recommended screening schedule for the next 5-10 years is provided to the patient in written form: see Patient Instructions/AVS.     Return in 6 months (on 1/24/2024). Subjective   The following acute and/or chronic problems were also addressed today:    Tiredness- Sleeping after dinner for a few hours. Then awake later in the day and staying awake after wife down. Sometimes not lying down till 3am. Averaging about 3-5hrs nightly in bed but then a few hours napping in chair after dinner. Nocturia-getting up between 2-3 times nightly. Denies copious hydration at night. Previously discussed with urology about laser treatment. Patient still with some questions regarding of this and unsure if he wants to pursue this treatment. Patient's complete Health Risk Assessment and screening values have been reviewed and are found in Flowsheets. The following problems were reviewed today and where indicated follow up appointments were made and/or referrals ordered.     Positive Risk Factor

## 2023-07-28 ENCOUNTER — APPOINTMENT (OUTPATIENT)
Facility: HOSPITAL | Age: 73
End: 2023-07-28
Payer: MEDICARE

## 2023-08-02 DIAGNOSIS — G62.9 POLYNEUROPATHY, UNSPECIFIED: ICD-10-CM

## 2023-08-04 ENCOUNTER — APPOINTMENT (OUTPATIENT)
Facility: HOSPITAL | Age: 73
End: 2023-08-04
Payer: MEDICARE

## 2023-08-04 NOTE — TELEPHONE ENCOUNTER
Gabapentin is controlled, which means prescribed in the context of an office visit. We can prescribe up to 3 months at a time and 1 refill (ie 6 months total). This was last prescribed March 13, which therefore means the patient should have adequate supply until mid September. Please schedule appointment with Dr. Julian Ch before then.    BRIAN

## 2023-08-11 ENCOUNTER — APPOINTMENT (OUTPATIENT)
Facility: HOSPITAL | Age: 73
End: 2023-08-11
Payer: MEDICARE

## 2023-08-11 RX ORDER — GABAPENTIN 400 MG/1
CAPSULE ORAL
Qty: 270 CAPSULE | Refills: 3 | Status: SHIPPED | OUTPATIENT
Start: 2023-08-11 | End: 2024-02-07

## 2023-08-18 ENCOUNTER — HOSPITAL ENCOUNTER (OUTPATIENT)
Facility: HOSPITAL | Age: 73
Setting detail: RECURRING SERIES
Discharge: HOME | End: 2023-08-21
Payer: MEDICARE

## 2023-08-18 PROCEDURE — 97112 NEUROMUSCULAR REEDUCATION: CPT

## 2023-08-18 PROCEDURE — 97530 THERAPEUTIC ACTIVITIES: CPT

## 2023-08-18 PROCEDURE — 97162 PT EVAL MOD COMPLEX 30 MIN: CPT

## 2023-08-25 ENCOUNTER — HOSPITAL ENCOUNTER (OUTPATIENT)
Facility: HOSPITAL | Age: 73
Setting detail: RECURRING SERIES
Discharge: HOME | End: 2023-08-28
Payer: MEDICARE

## 2023-08-25 PROCEDURE — 97140 MANUAL THERAPY 1/> REGIONS: CPT

## 2023-08-25 PROCEDURE — 97530 THERAPEUTIC ACTIVITIES: CPT

## 2023-08-25 PROCEDURE — 97016 VASOPNEUMATIC DEVICE THERAPY: CPT

## 2023-08-25 PROCEDURE — 97112 NEUROMUSCULAR REEDUCATION: CPT

## 2023-08-25 NOTE — PROGRESS NOTES
PHYSICAL / OCCUPATIONAL THERAPY - DAILY TREATMENT NOTE (updated )    Patient Name: Quynh Varner    Date: 2023    : 1950  Insurance: Payor: [facility] MEDICARE / Plan: Ana Sifuentes / Product Type: *No Product type* /      Patient  verified Yes     Visit #   Current / Total 2 24   Time   In / Out 9:22 10:04   Pain   In / Out 0 0   Subjective Functional Status/Changes: Reports that he has some stiffness in the back, no real pain. Reports he felt fine after the eval. Reports he was able to perform some of the HEP. Changes to: Allergies, Med Hx, Sx Hx?   no       TREATMENT AREA =  Other low back pain [M54.59]    OBJECTIVE    Therapeutic Procedures: Tx Min Billable or 1:1 Min (if diff from Tx Min) Procedure, Rationale, Specifics   8  73431 Therapeutic Exercise (timed):  increase ROM, strength, coordination, balance, and proprioception to improve patient's ability to progress to PLOF and address remaining functional goals. (see flow sheet as applicable)     Details if applicable:       10  16748 Therapeutic Activity (timed):  use of dynamic activities replicating functional movements to increase ROM, strength, coordination, balance, and proprioception in order to improve patient's ability to progress to PLOF and address remaining functional goals. (see flow sheet as applicable)     Details if applicable:     8  80213 Manual Therapy (timed):  decrease pain, increase ROM, increase tissue extensibility, decrease trigger points, and increase postural awareness to improve patient's ability to progress to PLOF and address remaining functional goals. The manual therapy interventions were performed at a separate and distinct time from the therapeutic activities interventions .  (see flow sheet as applicable)     Details if applicable:  Pt in hook lying position: MARCO Left AIC correction, Sternal mobilization with active pelvic tilt, Right subclavius release (MARCO technique), MARCO infraclavicular rib

## 2023-09-01 ENCOUNTER — HOSPITAL ENCOUNTER (OUTPATIENT)
Facility: HOSPITAL | Age: 73
Setting detail: RECURRING SERIES
Discharge: HOME | End: 2023-09-04
Payer: MEDICARE

## 2023-09-01 PROCEDURE — 97110 THERAPEUTIC EXERCISES: CPT

## 2023-09-01 PROCEDURE — 97530 THERAPEUTIC ACTIVITIES: CPT

## 2023-09-01 PROCEDURE — 97112 NEUROMUSCULAR REEDUCATION: CPT

## 2023-09-01 NOTE — PROGRESS NOTES
PHYSICAL / OCCUPATIONAL THERAPY - DAILY TREATMENT NOTE (updated )    Patient Name: Jerrell Kurtz    Date: 2023    : 1950  Insurance: Payor: [facility] MEDICARE / Plan: Siobhan Herr / Product Type: *No Product type* /      Patient  verified Yes     Visit #   Current / Total 3 24   Time   In / Out 9:19am 09:59   Pain   In / Out 0 0   Subjective Functional Status/Changes: The pt denied pain this morning but reported soreness in his lowerback   Changes to: Allergies, Med Hx, Sx Hx?   no       TREATMENT AREA =  Other low back pain [M54.59]    OBJECTIVE  Therapeutic Procedures: Tx Min Billable or 1:1 Min (if diff from Tx Min) Procedure, Rationale, Specifics   16  64628 Therapeutic Exercise (timed):  increase ROM, strength, coordination, balance, and proprioception to improve patient's ability to progress to PLOF and address remaining functional goals. (see flow sheet as applicable)     Details if applicable:       14  01983 Neuromuscular Re-Education (timed):  improve balance, coordination, kinesthetic sense, posture, core stability and proprioception to improve patient's ability to develop conscious control of individual muscles and awareness of position of extremities in order to progress to PLOF and address remaining functional goals. (see flow sheet as applicable)     Details if applicable:     10  91995 Therapeutic Activity (timed):  use of dynamic activities replicating functional movements to increase ROM, strength, coordination, balance, and proprioception in order to improve patient's ability to progress to PLOF and address remaining functional goals.   (see flow sheet as applicable)     Details if applicable:            Details if applicable:            Details if applicable:     36  [facility] Totals Reminder: bill using total billable min of TIMED therapeutic procedures (example: do not include dry needle or estim unattended, both untimed codes, in totals to left)  8-22 min = 1 unit;

## 2023-09-08 ENCOUNTER — HOSPITAL ENCOUNTER (OUTPATIENT)
Facility: HOSPITAL | Age: 73
Setting detail: RECURRING SERIES
Discharge: HOME | End: 2023-09-11
Payer: MEDICARE

## 2023-09-08 PROCEDURE — 97110 THERAPEUTIC EXERCISES: CPT

## 2023-09-08 PROCEDURE — 97530 THERAPEUTIC ACTIVITIES: CPT

## 2023-09-08 PROCEDURE — 97112 NEUROMUSCULAR REEDUCATION: CPT

## 2023-09-08 NOTE — PROGRESS NOTES
handout and appeared to understand. Current:9/8/23  reports noncompliance      2. Patient will rate pain on greater than 4/10 so pt can perform ADL's. Status at IE:_5__/10 max (in the last 48 hrs) _2__/10 min (in the last 48 hrs) __3_/10 currently at rest  Current: 9/8/23 rates pain 4/10 this therapy session, soreness     Long Term Goals: To be accomplished in 12 weeks: 1. Patient will increase Bilateral LE strength by at least  5-10#  throughout so pt can perform sit to stand transfer with minimal difficulty. Status at IE:    Left (0-5) Right (0-5)   Hip Flexion (L1,2) supine 25# 25#   Knee Extension (L3,4) 60# 50#   Knee Flexion (S1,2) 30# 30#      Current: Same as IE     2. Patient will report pain no greater than 1-2/10 throughout entire day to aid in completion of ADLs. Status at IE:_5__/10 max (in the last 48 hrs) _2__/10 min (in the last 48 hrs) __3_/10 currently at rest  Current: 9/8/23 9/8/23 rates pain 4/10 this therapy session, soreness     3. Patient will have 2 inch improvement in Lower trunk rotation and lumbar forward flexion so pt can  objects off the ground. Status at IE:   AROM  AROM (inches) Comments:pain, area   Forward flexion  1.5 inches finger tip to ground Stretch in the back, hamstring stretch, no reversal of lordodic curve   Lower trunk Rotation right  20.5     Lower trunk Rotation left  20.5        Current: 9/8/23: Lower trunk rotation (inches): Right: 19/18 left: 18.5/17.5     4. Patient will improve FOTO score to 67 points to demonstrate improvement in functional status. Status at IE:56  Current: Same as IE     *FOTO score is an established functional score where 100 = no disability*     5. Pt will have negative adductor drop test so that pt can have proper mechanics to perform ADLs.   Status at IE: positive bilaterally  Current: 9/8/23 Adductor Drop Test: right: NT/neg     left: NT/midline      PLAN  Yes  Continue plan of care  []  Upgrade activities as tolerated  []  Discharge

## 2023-09-14 NOTE — PROGRESS NOTES
PHYSICAL / OCCUPATIONAL THERAPY - DAILY TREATMENT NOTE (updated )    Patient Name: Diandra Mclain    Date: 2023    : 1950  Insurance: Payor: [facility] MEDICARE / Plan: [address] / Product Type: *No Product type* /      Patient  verified Yes     Visit #   Current / Total 5 24   Time   In / Out 10:40 11:19   Pain   In / Out 3 0   Subjective Functional Status/Changes: Patient reports his back pain is improving since beginning therapy. Reports his walking was terrible- he was moving side to side. Now his walking is improved and much less side to side motion. Patient reports he's having much less pain during activity, such as prolonged walking and standing. Changes to: Allergies, Med Hx, Sx Hx?   no       TREATMENT AREA =  Other low back pain [M54.59]    OBJECTIVE    Therapeutic Procedures: Tx Min Billable or 1:1 Min (if diff from Tx Min) Procedure, Rationale, Specifics   9  18222 Therapeutic Exercise (timed):  increase ROM, strength, coordination, balance, and proprioception to improve patient's ability to progress to PLOF and address remaining functional goals. (see flow sheet as applicable)    Details if applicable:         43720 Neuromuscular Re-Education (timed):  improve balance, coordination, kinesthetic sense, posture, core stability and proprioception to improve patient's ability to develop conscious control of individual muscles and awareness of position of extremities in order to progress to PLOF and address remaining functional goals. (see flow sheet as applicable)    Details if applicable:     10  14805 Therapeutic Activity (timed):  use of dynamic activities replicating functional movements to increase ROM, strength, coordination, balance, and proprioception in order to improve patient's ability to progress to PLOF and address remaining functional goals.   (see flow sheet as applicable)     Details if applicable:           Details if applicable:            Details if

## 2023-09-15 ENCOUNTER — HOSPITAL ENCOUNTER (OUTPATIENT)
Facility: HOSPITAL | Age: 73
Setting detail: RECURRING SERIES
Discharge: HOME | End: 2023-09-18
Payer: MEDICARE

## 2023-09-15 PROCEDURE — 97530 THERAPEUTIC ACTIVITIES: CPT

## 2023-09-15 PROCEDURE — 97112 NEUROMUSCULAR REEDUCATION: CPT

## 2023-09-15 PROCEDURE — 97110 THERAPEUTIC EXERCISES: CPT

## 2023-09-15 NOTE — PROGRESS NOTES
[facility] at the [place] [address] [address]  Phone: [phone]      Fax:  [phone]    Progress Note  Patient name: Evan Borden Start of Care: 2023   Referral source: Vini Alva MD : 1950               Medical Diagnosis: Other low back pain [M54.59]        Onset Date:23               Treatment Diagnosis: M54.59  OTHER LOWER BACK PAIN                            Prior Hospitalization: see medical history Provider#: [id number]   Medications: Verified on Patient Summary List      Co-morbidities: PMHx/Surgical Hx:  []DM [x] HTN (controlled) [] High cholesterol (controlled) [] Cancer [x] Arthritis-bilateral knees   [x]Other heart murmur, leaky valve, cancer-non Hodgkin's lymphoma, OA, Dupuytren release bilateral hands, tumor removed from foot, hip bursitis, and a fib  Prior Level of Function:  functionally independent, no AD,   Social/Recreation/Work: Work Hx: retired  Recreational Activities: watching TV                The Plan of Care and following information is based on the information from the initial evaluation. Visits from Start of Care: 5    Missed Visits: 0    Updated Goals/Measure of Progress: Short Term Goals: To be accomplished in 4 weeks:  1..Patient will report compliance with HEP 1x/day to aid in rehabilitation program.  Status at IE:Pt was given HEP handout and appeared to understand. Current:23  reports noncompliance   PN 9/15/23: patient reports compliance 2x/week; receptive to encouragement to increase frequency for consistency, progressing     2. Patient will rate pain on greater than 4/10 so pt can perform ADL's. Status at IE:_5__/10 max (in the last 48 hrs) _2__/10 min (in the last 48 hrs) __3_/10 currently at rest  Current: 23 rates pain 4/10 this therapy session, soreness  PN 9/15/23: max pain in last 48 hours: 8/10 this morning, some instances of 0/10, progressing     Long Term Goals:  To be

## 2023-09-19 DIAGNOSIS — I48.0 PAROXYSMAL ATRIAL FIBRILLATION (HCC): ICD-10-CM

## 2023-09-22 ENCOUNTER — HOSPITAL ENCOUNTER (OUTPATIENT)
Facility: HOSPITAL | Age: 73
Setting detail: RECURRING SERIES
Discharge: HOME | End: 2023-09-25
Payer: MEDICARE

## 2023-09-22 PROCEDURE — 97112 NEUROMUSCULAR REEDUCATION: CPT

## 2023-09-22 PROCEDURE — 97530 THERAPEUTIC ACTIVITIES: CPT

## 2023-09-22 PROCEDURE — 97110 THERAPEUTIC EXERCISES: CPT

## 2023-09-22 NOTE — PROGRESS NOTES
PHYSICAL / OCCUPATIONAL THERAPY - DAILY TREATMENT NOTE (updated )    Patient Name: Corry Villaseñor    Date: 2023    : 1950  Insurance: Payor: [facility] MEDICARE / Plan: Moni Josh / Product Type: *No Product type* /      Patient  verified Yes     Visit #   Current / Total 6 17   Time   In / Out 10:01 10:41   Pain   In / Out Stiff 1 0   Subjective Functional Status/Changes: Reports that he just has some stiffness today. Reports that he tries to do his HEP, sit to stands. Changes to: Allergies, Med Hx, Sx Hx?   no       TREATMENT AREA =  Other low back pain [M54.59]    OBJECTIVE      Therapeutic Procedures: Tx Min Billable or 1:1 Min (if diff from Tx Min) Procedure, Rationale, Specifics   15  [id number] Therapeutic Exercise (timed):  increase ROM, strength, coordination, balance, and proprioception to improve patient's ability to progress to PLOF and address remaining functional goals. (see flow sheet as applicable)    Details if applicable:       10  71461 Therapeutic Activity (timed):  use of dynamic activities replicating functional movements to increase ROM, strength, coordination, balance, and proprioception in order to improve patient's ability to progress to PLOF and address remaining functional goals. (see flow sheet as applicable)    Details if applicable:     15  65833 Neuromuscular Re-Education (timed):  improve balance, coordination, kinesthetic sense, posture, core stability and proprioception to improve patient's ability to develop conscious control of individual muscles and awareness of position of extremities in order to progress to PLOF and address remaining functional goals.  (see flow sheet as applicable)     Details if applicable:               36  [facility] Totals Reminder: bill using total billable min of TIMED therapeutic procedures (example: do not include dry needle or estim unattended, both untimed codes, in totals to left)  8-22 min = 1 unit; 23-37 min = 2 units;

## 2023-09-24 RX ORDER — APIXABAN 5 MG/1
5 TABLET, FILM COATED ORAL 2 TIMES DAILY
Qty: 200 TABLET | Refills: 3 | Status: SHIPPED | OUTPATIENT
Start: 2023-09-24

## 2023-09-28 ENCOUNTER — APPOINTMENT (OUTPATIENT)
Facility: HOSPITAL | Age: 73
End: 2023-09-28
Payer: MEDICARE

## 2023-10-02 NOTE — TELEPHONE ENCOUNTER
Pt stated he is no longer a patient and he would like for Dr. Noah Levine to stop sending in medication for hydrochlorizide. Please advise.  Thank you!! 02-Oct-2023 18:53

## 2023-10-20 ENCOUNTER — HOSPITAL ENCOUNTER (OUTPATIENT)
Facility: HOSPITAL | Age: 73
Setting detail: RECURRING SERIES
Discharge: HOME | End: 2023-10-23
Payer: MEDICARE

## 2023-10-20 PROCEDURE — 97530 THERAPEUTIC ACTIVITIES: CPT

## 2023-10-20 PROCEDURE — 97110 THERAPEUTIC EXERCISES: CPT

## 2023-10-20 PROCEDURE — 97112 NEUROMUSCULAR REEDUCATION: CPT

## 2023-10-20 NOTE — PROGRESS NOTES
address soft tissue restrictions, analyze and cue for proper movement patterns, and analyze and modify for postural abnormalities to address functional deficits and attain remaining goals. Progress toward goals / Updated goals:  []  See Progress Note/Recertification       Short Term Goals: To be accomplished in 4 weeks:  1..Patient will report compliance with HEP 1x/day to aid in rehabilitation program.  Status at IE:Pt was given HEP handout and appeared to understand. Last  PN 9/15/23: patient reports compliance 2x/week; receptive to encouragement to increase frequency for consistency, progressing  Current: 10/20/23 Reports compliance 2x/week. 2.Patient will rate pain on greater than 4/10 so pt can perform ADL's. Status at IE:_5__/10 max (in the last 48 hrs) _2__/10 min (in the last 48 hrs) __3_/10 currently at rest  Last PN 9/15/23: max pain in last 48 hours: 8/10 this morning, some instances of 0/10, progressing  Current: 10/20/23 Reports pain between 0-6/10 in the last week      Long Term Goals: To be accomplished in 12 weeks: 1. Patient will increase Bilateral LE strength by at least  5-10#  throughout so pt can perform sit to stand transfer with minimal difficulty. Status at IE:    Left (0-5) Right (0-5)   Hip Flexion (L1,2) supine 25# 25#   Knee Extension (L3,4) 60# 50#   Knee Flexion (S1,2) 30# 30#     Last PN 9/15/23: not formally assessed this session  Current: 10/20/23     Left (0-5) Right (0-5)   Hip Flexion (L1,2) supine 40# 50#   Knee Extension (L3,4) 60# 50#   Knee Flexion (S1,2) 45# 45#        2. Patient will report pain no greater than 1-2/10 throughout entire day to aid in completion of ADLs. Status at IE:_5__/10 max (in the last 48 hrs) _2__/10 min (in the last 48 hrs) __3_/10 currently at rest  Last PN 9/15/23:max pain in last 48 hours: 8/10 this morning, some instances of 0/10, progressing  Current: 10/20/23 Reports pain between 0-6/10 in the last week      3.  Patient will have 2
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
## 2023-10-25 ENCOUNTER — HOSPITAL ENCOUNTER (OUTPATIENT)
Facility: HOSPITAL | Age: 73
Setting detail: RECURRING SERIES
Discharge: HOME | End: 2023-10-28
Payer: MEDICARE

## 2023-10-25 PROCEDURE — 97112 NEUROMUSCULAR REEDUCATION: CPT

## 2023-10-25 PROCEDURE — 97530 THERAPEUTIC ACTIVITIES: CPT

## 2023-10-25 PROCEDURE — 97110 THERAPEUTIC EXERCISES: CPT

## 2023-10-25 NOTE — PROGRESS NOTES
38-52 min = 3 units; 53-67 min = 4 units; 68-82 min = 5 units   Total Total     TOTAL TREATMENT TIME:        38     [x]  Patient Education billed concurrently with other procedures   [x] Review HEP    [] Progressed/Changed HEP, detail:    [] Other detail:       Objective Information/Functional Measures/Assessment    Response to treatment:  Patient required manual assistance for adductor pull back. Required cues to facilitate right glute max to inhibit right adductor. Minor cues for form with PPT. No reports of increased pain with treatment today. Patient would benefit from continued skilled PT to address deficits below. Plan for next session:  Rib mobs  Lady in glasses  Left post capsule       Patient will continue to benefit from skilled PT / OT services to modify and progress therapeutic interventions, analyze and address functional mobility deficits, analyze and address ROM deficits, analyze and address strength deficits, analyze and address soft tissue restrictions, analyze and cue for proper movement patterns, and analyze and modify for postural abnormalities to address functional deficits and attain remaining goals. Progress toward goals / Updated goals:  []  See Progress Note/Recertification    Short Term Goals: To be accomplished in 4 weeks:  1..Patient will report compliance with HEP 1x/day to aid in rehabilitation program.  Status at IE:Pt was given HEP handout and appeared to understand. Last PN: 10/20/23 Reports compliance 2x/week. 2.Patient will rate pain on greater than 4/10 so pt can perform ADL's. Status at IE:_5__/10 max (in the last 48 hrs) _2__/10 min (in the last 48 hrs) __3_/10 currently at rest  Last PN: 10/20/23 Reports pain between 0-6/10 in the last week      Long Term Goals: To be accomplished in 12 weeks: 1. Patient will increase Bilateral LE strength by at least  5-10#  throughout so pt can perform sit to stand transfer with minimal difficulty.   Status at IE:    Left (0-5)

## 2023-11-03 ENCOUNTER — HOSPITAL ENCOUNTER (OUTPATIENT)
Facility: HOSPITAL | Age: 73
Setting detail: RECURRING SERIES
Discharge: HOME | End: 2023-11-06
Payer: MEDICARE

## 2023-11-03 PROCEDURE — 97112 NEUROMUSCULAR REEDUCATION: CPT

## 2023-11-03 PROCEDURE — 97535 SELF CARE MNGMENT TRAINING: CPT

## 2023-11-03 PROCEDURE — 97530 THERAPEUTIC ACTIVITIES: CPT

## 2023-11-03 NOTE — PROGRESS NOTES
PHYSICAL / OCCUPATIONAL THERAPY - DAILY TREATMENT NOTE (updated )    Patient Name: Raleigh Moss    Date: 11/3/2023    : 1950  Insurance: Payor: [facility] MEDICARE / Plan: Patsy Coleman / Product Type: *No Product type* /      Patient  verified Yes     Visit #   Current / Total 9 17   Time   In / Out 1042 1125   Pain   In / Out 0 0   Subjective Functional Status/Changes: Reports next visit will be his last    Changes to: Allergies, Med Hx, Sx Hx?   no       TREATMENT AREA =  Other low back pain [M54.59]    OBJECTIVE      Therapeutic Procedures: Tx Min Billable or 1:1 Min (if diff from Tx Min) Procedure, Rationale, Specifics   10  [id number] Neuromuscular Re-Education (timed):  improve balance, coordination, kinesthetic sense, posture, core stability and proprioception to improve patient's ability to develop conscious control of individual muscles and awareness of position of extremities in order to progress to PLOF and address remaining functional goals. (see flow sheet as applicable)    Details if applicable:     20  09087 Therapeutic Activity (timed):  use of dynamic activities replicating functional movements to increase ROM, strength, coordination, balance, and proprioception in order to improve patient's ability to progress to PLOF and address remaining functional goals. (see flow sheet as applicable)     Details if applicable:     13  45313 Self Care/Home Management (timed):  improve patient knowledge and understanding of physical therapy expectations, procedures and progression  to improve patient's ability to progress to PLOF and address remaining functional goals.   (see flow sheet as applicable)    Details if applicable:            Details if applicable:     37  [facility] Totals Reminder: bill using total billable min of TIMED therapeutic procedures (example: do not include dry needle or estim unattended, both untimed codes, in totals to left)  8-22 min = 1 unit; 23-37 min = 2 units; 38-52

## 2023-11-06 DIAGNOSIS — I10 ESSENTIAL HYPERTENSION: ICD-10-CM

## 2023-11-07 DIAGNOSIS — E78.5 DYSLIPIDEMIA: ICD-10-CM

## 2023-11-08 RX ORDER — ATORVASTATIN CALCIUM 40 MG/1
40 TABLET, FILM COATED ORAL DAILY
Qty: 100 TABLET | Refills: 2 | OUTPATIENT
Start: 2023-11-08

## 2023-11-08 RX ORDER — BENAZEPRIL HYDROCHLORIDE 40 MG/1
40 TABLET ORAL DAILY
Qty: 100 TABLET | Refills: 2 | OUTPATIENT
Start: 2023-11-08

## 2023-11-10 ENCOUNTER — APPOINTMENT (OUTPATIENT)
Facility: HOSPITAL | Age: 73
End: 2023-11-10
Payer: MEDICARE

## 2023-11-17 ENCOUNTER — HOSPITAL ENCOUNTER (OUTPATIENT)
Facility: HOSPITAL | Age: 73
Setting detail: RECURRING SERIES
Discharge: HOME | End: 2023-11-20
Payer: MEDICARE

## 2023-11-17 PROCEDURE — 97535 SELF CARE MNGMENT TRAINING: CPT

## 2023-11-17 PROCEDURE — 97530 THERAPEUTIC ACTIVITIES: CPT

## 2023-11-17 PROCEDURE — 97112 NEUROMUSCULAR REEDUCATION: CPT

## 2023-11-17 NOTE — PROGRESS NOTES
PHYSICAL / OCCUPATIONAL THERAPY - DAILY TREATMENT NOTE (updated )    Patient Name: Odalis Kaiser    Date: 2023    : 1950  Insurance: Payor: [facility] MEDICARE / Plan: Lebron Koehlered / Product Type: *No Product type* /      Patient  verified Yes     Visit #   Current / Total 10 17   Time   In / Out 1200 1246   Pain   In / Out 3-4 0   Subjective Functional Status/Changes: Reports he had to cancel last week because he was unable to make it. But is going to proceed with DC today per patient request    Changes to: Allergies, Med Hx, Sx Hx?   no       TREATMENT AREA =  Other low back pain [M54.59]    OBJECTIVE      Therapeutic Procedures: Tx Min Billable or 1:1 Min (if diff from Tx Min) Procedure, Rationale, Specifics   10  [id number] Neuromuscular Re-Education (timed):  improve balance, coordination, kinesthetic sense, posture, core stability and proprioception to improve patient's ability to develop conscious control of individual muscles and awareness of position of extremities in order to progress to PLOF and address remaining functional goals. (see flow sheet as applicable)    Details if applicable:     20  16967 Therapeutic Activity (timed):  use of dynamic activities replicating functional movements to increase ROM, strength, coordination, balance, and proprioception in order to improve patient's ability to progress to PLOF and address remaining functional goals. (see flow sheet as applicable)     Details if applicable:     16  89797 Self Care/Home Management (timed):  improve patient knowledge and understanding of pain reducing techniques, positioning, and physical therapy expectations, procedures and progression  to improve patient's ability to progress to PLOF and address remaining functional goals.   (see flow sheet as applicable)    Details if applicable:            Details if applicable:     36  [facility] Totals Reminder: bill using total billable min of TIMED therapeutic procedures

## 2023-11-24 NOTE — PROGRESS NOTES
[facility] at the [place] [address]  Phone: [phone]      Fax:  [phone]            Discharge Summary    Patient name: Sera Blackburn Start of Care: 2023   Referral source: Christina Ramey MD : 1950               Medical Diagnosis: Other low back pain [M54.59]        Onset Date:23               Treatment Diagnosis: M54.59  OTHER LOWER BACK PAIN                            Prior Hospitalization: see medical history Provider#: [id number]   Medications: Verified on Patient Summary List      Co-morbidities: PMHx/Surgical Hx:  []DM [x] HTN (controlled) [] High cholesterol (controlled) [] Cancer [x] Arthritis-bilateral knees   [x]Other heart murmur, leaky valve, cancer-non Hodgkin's lymphoma, OA, Dupuytren release bilateral hands, tumor removed from foot, hip bursitis, and a fib  Prior Level of Function:  functionally independent, no AD,   Social/Recreation/Work: Work Hx: retired  Recreational Activities: watching TV         Visits from [facility] Care: 10    Missed Visits:     Reporting Period : 23 to 23    Goals/Measure of Progress:  Per 23 visit  Short Term Goals: To be accomplished in 4 weeks:  1..Patient will report compliance with HEP 1x/day to aid in rehabilitation program.  Status at IE:Pt was given HEP handout and appeared to understand. Last PN: 10/20/23 Reports compliance 2x/week. Current: 23 Continues to report non compliance      2. Patient will rate pain on greater than 4/10 so pt can perform ADL's. Status at IE:_5__/10 max (in the last 48 hrs) _2__/10 min (in the last 48 hrs) __3_/10 currently at rest  Last PN: 10/20/23 Reports pain between 0-6/10 in the last week   Current: 23 0-7/10 in the last week       Long Term Goals: To be accomplished in 12 weeks: 1. Patient will increase Bilateral LE strength by at least  5-10#  throughout so pt can perform sit to stand transfer with

## 2023-12-22 DIAGNOSIS — I10 ESSENTIAL HYPERTENSION: ICD-10-CM

## 2023-12-22 DIAGNOSIS — E78.5 DYSLIPIDEMIA: ICD-10-CM

## 2023-12-27 RX ORDER — BENAZEPRIL HYDROCHLORIDE 40 MG/1
40 TABLET ORAL DAILY
Qty: 90 TABLET | Refills: 3 | Status: SHIPPED | OUTPATIENT
Start: 2023-12-27

## 2023-12-27 RX ORDER — ATORVASTATIN CALCIUM 40 MG/1
40 TABLET, FILM COATED ORAL DAILY
Qty: 90 TABLET | Refills: 3 | Status: SHIPPED | OUTPATIENT
Start: 2023-12-27

## 2023-12-27 NOTE — TELEPHONE ENCOUNTER
Patient medication was last sent in on 3/24/23 90 with 3 refills, he has follow up scheduled on 1/24/24. Please review and sign if appropriate.

## 2024-01-20 DIAGNOSIS — G62.9 POLYNEUROPATHY, UNSPECIFIED: ICD-10-CM

## 2024-01-23 NOTE — TELEPHONE ENCOUNTER
Medication was sent in to [facility] on 8/11/2023 for 270 with 3 refills. Patient has an appt on tomorrow for his follow up. Will call pharmacy to check refill status.

## 2024-01-23 NOTE — TELEPHONE ENCOUNTER
Called [facility] spoke with pharmacists who states due to Gabapentin being a controlled substance in [place] it is only allowed a 6 month supply at a time. He says the prescription on file with them will  on  and the patient will be due for refills on 2/15. Patient does have a 6 month follow up tomorrow. Please review and sign if appropriate.

## 2024-01-24 ENCOUNTER — OFFICE VISIT (OUTPATIENT)
Facility: CLINIC | Age: 74
End: 2024-01-24
Payer: MEDICARE

## 2024-01-24 VITALS
WEIGHT: 187.25 LBS | TEMPERATURE: 97.7 F | HEART RATE: 64 BPM | DIASTOLIC BLOOD PRESSURE: 62 MMHG | OXYGEN SATURATION: 97 % | RESPIRATION RATE: 16 BRPM | SYSTOLIC BLOOD PRESSURE: 146 MMHG | BODY MASS INDEX: 24.7 KG/M2

## 2024-01-24 DIAGNOSIS — R35.0 FREQUENT URINATION: ICD-10-CM

## 2024-01-24 DIAGNOSIS — I10 ESSENTIAL HYPERTENSION: Primary | ICD-10-CM

## 2024-01-24 DIAGNOSIS — R73.03 PREDIABETES: ICD-10-CM

## 2024-01-24 PROBLEM — E78.5 HYPERLIPIDEMIA: Status: ACTIVE | Noted: 2023-06-21

## 2024-01-24 PROBLEM — I24.9 ACUTE CORONARY SYNDROME (HCC): Status: ACTIVE | Noted: 2023-06-21

## 2024-01-24 PROBLEM — S52.261K: Status: ACTIVE | Noted: 2023-03-14

## 2024-01-24 PROBLEM — I44.7 LEFT BUNDLE BRANCH BLOCK: Status: ACTIVE | Noted: 2023-06-21

## 2024-01-24 PROCEDURE — 99214 OFFICE O/P EST MOD 30 MIN: CPT | Performed by: STUDENT IN AN ORGANIZED HEALTH CARE EDUCATION/TRAINING PROGRAM

## 2024-01-24 PROCEDURE — 1123F ACP DISCUSS/DSCN MKR DOCD: CPT | Performed by: STUDENT IN AN ORGANIZED HEALTH CARE EDUCATION/TRAINING PROGRAM

## 2024-01-24 PROCEDURE — 3078F DIAST BP <80 MM HG: CPT | Performed by: STUDENT IN AN ORGANIZED HEALTH CARE EDUCATION/TRAINING PROGRAM

## 2024-01-24 PROCEDURE — 3077F SYST BP >= 140 MM HG: CPT | Performed by: STUDENT IN AN ORGANIZED HEALTH CARE EDUCATION/TRAINING PROGRAM

## 2024-01-24 RX ORDER — UBIDECARENONE 75 MG
50 CAPSULE ORAL DAILY
COMMUNITY

## 2024-01-24 ASSESSMENT — PATIENT HEALTH QUESTIONNAIRE - PHQ9
SUM OF ALL RESPONSES TO PHQ9 QUESTIONS 1 & 2: 0
SUM OF ALL RESPONSES TO PHQ QUESTIONS 1-9: 0
SUM OF ALL RESPONSES TO PHQ QUESTIONS 1-9: 0
2. FEELING DOWN, DEPRESSED OR HOPELESS: 0
1. LITTLE INTEREST OR PLEASURE IN DOING THINGS: 0
SUM OF ALL RESPONSES TO PHQ QUESTIONS 1-9: 0
SUM OF ALL RESPONSES TO PHQ QUESTIONS 1-9: 0

## 2024-01-24 NOTE — PROGRESS NOTES
\"Have you been to the ER, urgent care clinic since your last visit?  Hospitalized since your last visit?\"    NO    “Have you seen or consulted any other health care providers outside of [facility] since your last visit?”    NO  Chief Complaint   Patient presents with    6 Month Follow-Up     No concerns.

## 2024-01-24 NOTE — PROGRESS NOTES
David Strong (: 1950) is a 74 y.o. male, established patient, here for evaluation of the following chief complaint(s):  6 Month Follow-Up (No concerns. )       Assessment/Plan:  1. Essential hypertension  Recommend restarting HCTZ due to high blood pressure. Although some frequency of urination/nocturia appears to be related to the medication, I believe his other habits are more to blame (late night drinking, caffeine  beverages, bladder irritants, etc). Goal blood pressure for age    2. Frequent urination  Discussed bladder irritants and given handout. Frequent use of coffee, tea and cola, even in the evening.     3. Prediabetes  A1c 6.1, 6 months ago. Recommend frequent exercise for management of this and BP. Overall low risk of progression to DM at this age and normal BMI.       Return in about 6 months (around 2024) for routine check up, medicare wellness visit.      Subjective/Objective:  HPI    HTN:  - Oncologist last Friday with high SBP. Had stopped HCTZ  - endorses: some lightheadedness.   - Some coffee and sausage this am.       BPH  - Still taking the alfluzosin. Doesn't feel like it helps. No upcoming appt with urology.     Prediabetes-   - Last A1c 6.1 2023. Some concentrated sugar beverages.   - New dx in chart.     Physical Exam  Blood pressure (!) 146/62, pulse 64, temperature 97.7 °F (36.5 °C), temperature source Tympanic, resp. rate 16, weight 84.9 kg (187 lb 4 oz), SpO2 97 %. Body mass index is 24.7 kg/m².  General appearance - Alert, NAD, normal appearance  Head - Atraumatic. Normocephalic.   Eyes - EOMI. Sclera white.   Ears - Hearing grossly normal.    Respiratory - LCTAB. Effort normal, without respiratory distress. No wheeze/rale/rhonchi  Heart - Normal rate, regular rhythm. No m/r/r  Neurological - No gross focal deficits. Speech normal. Alert and oriented. Mental status is at baseline.   Musculoskeletal - Grossly normal ROM, Gait normal.    Skin - normal coloration and

## 2024-01-28 PROBLEM — R73.03 PREDIABETES: Status: ACTIVE | Noted: 2024-01-28

## 2024-01-28 RX ORDER — GABAPENTIN 400 MG/1
CAPSULE ORAL
Qty: 270 CAPSULE | Refills: 1 | Status: SHIPPED | OUTPATIENT
Start: 2024-01-28 | End: 2024-07-26

## 2024-02-07 ENCOUNTER — OFFICE VISIT (OUTPATIENT)
Age: 74
End: 2024-02-07
Payer: MEDICARE

## 2024-02-07 VITALS
WEIGHT: 186 LBS | HEART RATE: 65 BPM | BODY MASS INDEX: 24.65 KG/M2 | DIASTOLIC BLOOD PRESSURE: 57 MMHG | SYSTOLIC BLOOD PRESSURE: 127 MMHG | HEIGHT: 73 IN

## 2024-02-07 DIAGNOSIS — I10 ESSENTIAL (PRIMARY) HYPERTENSION: ICD-10-CM

## 2024-02-07 DIAGNOSIS — I25.10 ATHEROSCLEROSIS OF NATIVE CORONARY ARTERY OF NATIVE HEART WITHOUT ANGINA PECTORIS: ICD-10-CM

## 2024-02-07 DIAGNOSIS — E78.5 HYPERLIPIDEMIA, UNSPECIFIED HYPERLIPIDEMIA TYPE: ICD-10-CM

## 2024-02-07 DIAGNOSIS — I48.0 PAROXYSMAL ATRIAL FIBRILLATION (HCC): Primary | ICD-10-CM

## 2024-02-07 DIAGNOSIS — Z79.01 CURRENT USE OF LONG TERM ANTICOAGULATION: ICD-10-CM

## 2024-02-07 DIAGNOSIS — I35.1 NONRHEUMATIC AORTIC (VALVE) INSUFFICIENCY: ICD-10-CM

## 2024-02-07 PROCEDURE — 3078F DIAST BP <80 MM HG: CPT | Performed by: INTERNAL MEDICINE

## 2024-02-07 PROCEDURE — 3074F SYST BP LT 130 MM HG: CPT | Performed by: INTERNAL MEDICINE

## 2024-02-07 PROCEDURE — 1123F ACP DISCUSS/DSCN MKR DOCD: CPT | Performed by: INTERNAL MEDICINE

## 2024-02-07 PROCEDURE — 1036F TOBACCO NON-USER: CPT | Performed by: INTERNAL MEDICINE

## 2024-02-07 PROCEDURE — 3017F COLORECTAL CA SCREEN DOC REV: CPT | Performed by: INTERNAL MEDICINE

## 2024-02-07 PROCEDURE — G8420 CALC BMI NORM PARAMETERS: HCPCS | Performed by: INTERNAL MEDICINE

## 2024-02-07 PROCEDURE — G8484 FLU IMMUNIZE NO ADMIN: HCPCS | Performed by: INTERNAL MEDICINE

## 2024-02-07 PROCEDURE — G8428 CUR MEDS NOT DOCUMENT: HCPCS | Performed by: INTERNAL MEDICINE

## 2024-02-07 PROCEDURE — 99214 OFFICE O/P EST MOD 30 MIN: CPT | Performed by: INTERNAL MEDICINE

## 2024-02-07 ASSESSMENT — PATIENT HEALTH QUESTIONNAIRE - PHQ9
SUM OF ALL RESPONSES TO PHQ QUESTIONS 1-9: 0
DEPRESSION UNABLE TO ASSESS: FUNCTIONAL CAPACITY MOTIVATION LIMITS ACCURACY
SUM OF ALL RESPONSES TO PHQ QUESTIONS 1-9: 0
2. FEELING DOWN, DEPRESSED OR HOPELESS: 0
1. LITTLE INTEREST OR PLEASURE IN DOING THINGS: 0
SUM OF ALL RESPONSES TO PHQ QUESTIONS 1-9: 0
SUM OF ALL RESPONSES TO PHQ QUESTIONS 1-9: 0
SUM OF ALL RESPONSES TO PHQ9 QUESTIONS 1 & 2: 0

## 2024-02-07 NOTE — PROGRESS NOTES
1. Have you been to the ER, urgent care clinic since your last visit?  Hospitalized since your last visit?     No    2. Have you seen or consulted any other health care providers outside of the [facility] since your last visit?  Include any pap smears or colon screening.      No      
display                  Assessment        Diagnosis Orders   1. Paroxysmal atrial fibrillation (HCC)      Stable continue treatment monitor      2. Atherosclerosis of native coronary artery of native heart without angina pectoris      Stable continue treatment      3. Nonrheumatic aortic (valve) insufficiency      Continue monitoring      4. Essential (primary) hypertension      Stable continue treatment      5. Hyperlipidemia, unspecified hyperlipidemia type      Continue treatment lab with PCP      6. Current use of long term anticoagulation      Continue for A-fib          There are no discontinued medications.      No orders of the defined types were placed in this encounter.      Follow-up and Dispositions    Return in about 6 months (around 8/7/2024).                 current treatment plan is effective, no change in therapy  use of aspirin to prevent MI and TIA's discussed.    Patient with risk factors seen for atypical chest pain.  ekg is abnormal with concerns for ischemia  No ischemia on stress test  Normal LV function with mild to moderate aortic regurgitation.  No vegetation noted on the aortic valve.      Underwent recent cardiac cath - mild CAD with normal LVEF.  Seen for f/u- bp stable on current meds  Has moderate aortic regurgitation with hypertensive heart disease.  Recommend optimal blood pressure control.  Follow-up in 6 months with repeat echo to assess AI.  All questions answered.  11/2022  Stable cardiac status.  Continue monitoring valvular disease.  Mild to moderate aortic regurgitation on recent echo.  Normal ejection fraction right side remains in large  6/2023  Patient is here after recent admission for evaluation and admission for chest pressure, new onset rapid atrial fibrillation.  Had cardiac cath showing 80% disease in a small right posterolateral branch treated medically.  He had SUZE and cardioversion.  He is here for follow-up.  Feels better and symptoms are improving.  Currently

## 2024-02-19 RX ORDER — POTASSIUM CHLORIDE 750 MG/1
10 TABLET, EXTENDED RELEASE ORAL DAILY
Qty: 100 TABLET | Refills: 2 | OUTPATIENT
Start: 2024-02-19

## 2024-02-27 ENCOUNTER — OFFICE VISIT (OUTPATIENT)
Facility: CLINIC | Age: 74
End: 2024-02-27
Payer: MEDICARE

## 2024-02-27 VITALS
WEIGHT: 185.25 LBS | OXYGEN SATURATION: 93 % | HEART RATE: 65 BPM | TEMPERATURE: 98 F | RESPIRATION RATE: 16 BRPM | BODY MASS INDEX: 24.44 KG/M2 | DIASTOLIC BLOOD PRESSURE: 62 MMHG | SYSTOLIC BLOOD PRESSURE: 144 MMHG

## 2024-02-27 DIAGNOSIS — R59.0 REACTIVE CERVICAL LYMPHADENOPATHY: ICD-10-CM

## 2024-02-27 DIAGNOSIS — J06.9 UPPER RESPIRATORY TRACT INFECTION, UNSPECIFIED TYPE: Primary | ICD-10-CM

## 2024-02-27 PROCEDURE — G8484 FLU IMMUNIZE NO ADMIN: HCPCS | Performed by: STUDENT IN AN ORGANIZED HEALTH CARE EDUCATION/TRAINING PROGRAM

## 2024-02-27 PROCEDURE — 1036F TOBACCO NON-USER: CPT | Performed by: STUDENT IN AN ORGANIZED HEALTH CARE EDUCATION/TRAINING PROGRAM

## 2024-02-27 PROCEDURE — G8427 DOCREV CUR MEDS BY ELIG CLIN: HCPCS | Performed by: STUDENT IN AN ORGANIZED HEALTH CARE EDUCATION/TRAINING PROGRAM

## 2024-02-27 PROCEDURE — 1123F ACP DISCUSS/DSCN MKR DOCD: CPT | Performed by: STUDENT IN AN ORGANIZED HEALTH CARE EDUCATION/TRAINING PROGRAM

## 2024-02-27 PROCEDURE — 99213 OFFICE O/P EST LOW 20 MIN: CPT | Performed by: STUDENT IN AN ORGANIZED HEALTH CARE EDUCATION/TRAINING PROGRAM

## 2024-02-27 PROCEDURE — 3078F DIAST BP <80 MM HG: CPT | Performed by: STUDENT IN AN ORGANIZED HEALTH CARE EDUCATION/TRAINING PROGRAM

## 2024-02-27 PROCEDURE — 3017F COLORECTAL CA SCREEN DOC REV: CPT | Performed by: STUDENT IN AN ORGANIZED HEALTH CARE EDUCATION/TRAINING PROGRAM

## 2024-02-27 PROCEDURE — G8420 CALC BMI NORM PARAMETERS: HCPCS | Performed by: STUDENT IN AN ORGANIZED HEALTH CARE EDUCATION/TRAINING PROGRAM

## 2024-02-27 PROCEDURE — 3077F SYST BP >= 140 MM HG: CPT | Performed by: STUDENT IN AN ORGANIZED HEALTH CARE EDUCATION/TRAINING PROGRAM

## 2024-02-27 NOTE — PROGRESS NOTES
\"Have you been to the ER, urgent care clinic since your last visit?  Hospitalized since your last visit?\"    NO    “Have you seen or consulted any other health care providers outside of [facility] since your last visit?”    NO    Chief Complaint   Patient presents with    Jaw Pain     Left jaw pain and swelling and some soreness down by the left chin. Pt states he did peroxide and warm salt water to try and help.              
(LIPITOR) 40 mg, Oral, DAILY    benazepril (LOTENSIN) 40 mg, Oral, DAILY    Eliquis 5 mg, Oral, 2 TIMES DAILY    gabapentin (NEURONTIN) 400 MG capsule TAKE 1 CAPSULE BY MOUTH 3 TIMES  DAILY    metoprolol tartrate (LOPRESSOR) 25 mg, Oral, 2 TIMES DAILY    Multiple Vitamins-Minerals (MULTIVITAMIN MEN 50+) TABS Oral, DAILY    Travoprost, JAY Free, (TRAVATAN Z) 0.004 % SOLN ophthalmic solution 1 drop, Ophthalmic    vitamin B-12 (CYANOCOBALAMIN) 50 mcg, Oral, DAILY    vitamin D (CHOLECALCIFEROL) 1,000 Units, Oral, DAILY    vitamin E 1,000 Units, Oral, DAILY           Note to patient: The purpose of this note is to communicate with the other providers involved in your care so is written using standard medical terminology. The voice recognition software Dragon was used. Please disregard any unanticipated grammatical, syntax, homophones, or other interpretive errors that have escaped my final proofreading. If you have questions regarding details of the note please call my office at [phone] and make an appointment to discuss your concerns.    An electronic signature was used to authenticate this note.  -- Gene Guerra MD

## 2024-04-16 RX ORDER — POTASSIUM CHLORIDE 750 MG/1
10 TABLET, EXTENDED RELEASE ORAL DAILY
Qty: 60 TABLET | Refills: 5 | OUTPATIENT
Start: 2024-04-16

## 2024-06-16 DIAGNOSIS — G62.9 POLYNEUROPATHY, UNSPECIFIED: ICD-10-CM

## 2024-06-20 RX ORDER — GABAPENTIN 400 MG/1
CAPSULE ORAL
Qty: 270 CAPSULE | Refills: 3 | Status: SHIPPED | OUTPATIENT
Start: 2024-06-20 | End: 2024-12-17

## 2024-07-24 ENCOUNTER — OFFICE VISIT (OUTPATIENT)
Facility: CLINIC | Age: 74
End: 2024-07-24
Payer: MEDICARE

## 2024-07-24 VITALS
TEMPERATURE: 97 F | RESPIRATION RATE: 16 BRPM | OXYGEN SATURATION: 96 % | WEIGHT: 183.5 LBS | BODY MASS INDEX: 24.85 KG/M2 | DIASTOLIC BLOOD PRESSURE: 60 MMHG | HEIGHT: 72 IN | SYSTOLIC BLOOD PRESSURE: 148 MMHG | HEART RATE: 83 BPM

## 2024-07-24 DIAGNOSIS — I10 ESSENTIAL HYPERTENSION: ICD-10-CM

## 2024-07-24 DIAGNOSIS — C83.00 SMALL B-CELL LYMPHOMA, UNSPECIFIED BODY REGION (HCC): ICD-10-CM

## 2024-07-24 DIAGNOSIS — Z00.00 MEDICARE ANNUAL WELLNESS VISIT, SUBSEQUENT: Primary | ICD-10-CM

## 2024-07-24 DIAGNOSIS — I48.0 PAROXYSMAL ATRIAL FIBRILLATION (HCC): ICD-10-CM

## 2024-07-24 DIAGNOSIS — E78.5 DYSLIPIDEMIA: ICD-10-CM

## 2024-07-24 DIAGNOSIS — R35.0 URINARY FREQUENCY: ICD-10-CM

## 2024-07-24 PROBLEM — I24.9 ACUTE CORONARY SYNDROME (HCC): Status: RESOLVED | Noted: 2023-06-21 | Resolved: 2024-07-24

## 2024-07-24 PROCEDURE — 3077F SYST BP >= 140 MM HG: CPT | Performed by: STUDENT IN AN ORGANIZED HEALTH CARE EDUCATION/TRAINING PROGRAM

## 2024-07-24 PROCEDURE — 3078F DIAST BP <80 MM HG: CPT | Performed by: STUDENT IN AN ORGANIZED HEALTH CARE EDUCATION/TRAINING PROGRAM

## 2024-07-24 PROCEDURE — 1123F ACP DISCUSS/DSCN MKR DOCD: CPT | Performed by: STUDENT IN AN ORGANIZED HEALTH CARE EDUCATION/TRAINING PROGRAM

## 2024-07-24 PROCEDURE — 99214 OFFICE O/P EST MOD 30 MIN: CPT | Performed by: STUDENT IN AN ORGANIZED HEALTH CARE EDUCATION/TRAINING PROGRAM

## 2024-07-24 PROCEDURE — G0439 PPPS, SUBSEQ VISIT: HCPCS | Performed by: STUDENT IN AN ORGANIZED HEALTH CARE EDUCATION/TRAINING PROGRAM

## 2024-07-24 NOTE — PATIENT INSTRUCTIONS
Chest pain or pressure, or a strange feeling in the chest.     Sweating.     Shortness of breath.     Pain, pressure, or a strange feeling in the back, neck, jaw, or upper belly or in one or both shoulders or arms.     Lightheadedness or sudden weakness.     A fast or irregular heartbeat.   After you call 911, the  may tell you to chew 1 adult-strength or 2 to 4 low-dose aspirin. Wait for an ambulance. Do not try to drive yourself.  Watch closely for changes in your health, and be sure to contact your doctor if you have any problems.  Where can you learn more?  Go to https://www.[employer].net/patientEd and enter F075 to learn more about \"A Healthy Heart: Care Instructions.\"  Current as of: June 24, 2023  Content Version: 14.1  © 1538-9600 [employer].   Care instructions adapted under license by [employer]. If you have questions about a medical condition or this instruction, always ask your healthcare professional. [employer] disclaims any warranty or liability for your use of this information.      Personalized Preventive Plan for David Strong - 7/24/2024  Medicare offers a range of preventive health benefits. Some of the tests and screenings are paid in full while other may be subject to a deductible, co-insurance, and/or copay.    Some of these benefits include a comprehensive review of your medical history including lifestyle, illnesses that may run in your family, and various assessments and screenings as appropriate.    After reviewing your medical record and screening and assessments performed today your provider may have ordered immunizations, labs, imaging, and/or referrals for you.  A list of these orders (if applicable) as well as your Preventive Care list are included within your After Visit Summary for your review.    Other Preventive Recommendations:    A preventive eye exam performed by an eye specialist is recommended every 1-2 years to screen for glaucoma;

## 2024-07-24 NOTE — PROGRESS NOTES
Medicare Annual Wellness Visit    David Strong is here for Medicare AWV       Assessment & Plan  1. Medicare annual wellness visit, subsequent  Discussed age appropriate well man screenings and preventative care.     2. Small B-cell lymphoma, unspecified body region (HCC)  Following with oncology.  Has had good follow-up with labs.  No acute signs requiring change in plan today.  I have reviewed his most up-to-date labs from oncology.    3. Urinary frequency  Has followed up with urology.  They debated but doing some procedures but he has held off.  Appears to be impacting his daily life but somewhat minimizing the symptoms.  On interested in further interventions at this time    4. Paroxysmal atrial fibrillation (HCC)  Has followed up with cardiology.  Tolerating blood thinners well and rate controlled on metoprolol.      5. Essential hypertension  Blood pressure mildly elevated but also with some lightheadedness.  Will leave blood pressure regimen the same for now to not exacerbate some of his lightheadedness symptoms    6. Dyslipidemia  Last labs/lipid panel within good range. Tolerating atorvastatin well. Continue current dosage.       Results    Medicare annual wellness visit, subsequent  Small B-cell lymphoma, unspecified body region (HCC)  Assessment & Plan:   Monitored by specialist- no acute findings meriting change in the plan  Urinary frequency  Paroxysmal atrial fibrillation (HCC)  Essential hypertension  Dyslipidemia    Recommendations for Preventive Services Due: see orders and patient instructions/AVS.  Recommended screening schedule for the next 5-10 years is provided to the patient in written form: see Patient Instructions/AVS.     Return in about 6 months (around 1/24/2025) for routine check up.     Subjective   History of Present Illness  The patient presents for evaluation of multiple medical concerns. He is accompanied by his wife.    He sought medical attention for a swollen lymph node, which

## 2024-07-24 NOTE — PROGRESS NOTES
Chief Complaint   Patient presents with    Medicare AWV       1. \"Have you been to the ER, urgent care clinic since your last visit?  Hospitalized since your last visit?\" No    2. \"Have you seen or consulted any other health care providers outside of the [facility] since your last visit?\" No     3. For patients aged 45-75: Has the patient had a colonoscopy / FIT/ Cologuard? NA - based on age

## 2024-07-29 ENCOUNTER — OFFICE VISIT (OUTPATIENT)
Facility: CLINIC | Age: 74
End: 2024-07-29
Payer: MEDICARE

## 2024-07-29 VITALS
WEIGHT: 182 LBS | BODY MASS INDEX: 24.65 KG/M2 | HEART RATE: 64 BPM | SYSTOLIC BLOOD PRESSURE: 150 MMHG | TEMPERATURE: 98.7 F | DIASTOLIC BLOOD PRESSURE: 68 MMHG | HEIGHT: 72 IN | OXYGEN SATURATION: 97 %

## 2024-07-29 DIAGNOSIS — R05.9 COUGH, UNSPECIFIED TYPE: ICD-10-CM

## 2024-07-29 DIAGNOSIS — R09.89 CHEST CONGESTION: ICD-10-CM

## 2024-07-29 DIAGNOSIS — J06.9 ACUTE UPPER RESPIRATORY INFECTION: Primary | ICD-10-CM

## 2024-07-29 LAB
EXP DATE SOLUTION: NORMAL
EXP DATE SWAB: NORMAL
EXPIRATION DATE: NORMAL
INFLUENZA A ANTIGEN, POC: NEGATIVE
INFLUENZA B ANTIGEN, POC: NEGATIVE
LOT NUMBER POC: NORMAL
LOT NUMBER SOLUTION: NORMAL
LOT NUMBER SWAB: NORMAL
SARS-COV-2 RNA, POC: NEGATIVE
VALID INTERNAL CONTROL, POC: YES

## 2024-07-29 PROCEDURE — 87635 SARS-COV-2 COVID-19 AMP PRB: CPT | Performed by: STUDENT IN AN ORGANIZED HEALTH CARE EDUCATION/TRAINING PROGRAM

## 2024-07-29 PROCEDURE — 3078F DIAST BP <80 MM HG: CPT | Performed by: STUDENT IN AN ORGANIZED HEALTH CARE EDUCATION/TRAINING PROGRAM

## 2024-07-29 PROCEDURE — 1123F ACP DISCUSS/DSCN MKR DOCD: CPT | Performed by: STUDENT IN AN ORGANIZED HEALTH CARE EDUCATION/TRAINING PROGRAM

## 2024-07-29 PROCEDURE — 87502 INFLUENZA DNA AMP PROBE: CPT | Performed by: STUDENT IN AN ORGANIZED HEALTH CARE EDUCATION/TRAINING PROGRAM

## 2024-07-29 PROCEDURE — 3077F SYST BP >= 140 MM HG: CPT | Performed by: STUDENT IN AN ORGANIZED HEALTH CARE EDUCATION/TRAINING PROGRAM

## 2024-07-29 PROCEDURE — G2211 COMPLEX E/M VISIT ADD ON: HCPCS | Performed by: STUDENT IN AN ORGANIZED HEALTH CARE EDUCATION/TRAINING PROGRAM

## 2024-07-29 PROCEDURE — 99213 OFFICE O/P EST LOW 20 MIN: CPT | Performed by: STUDENT IN AN ORGANIZED HEALTH CARE EDUCATION/TRAINING PROGRAM

## 2024-07-29 RX ORDER — AMOXICILLIN AND CLAVULANATE POTASSIUM 875; 125 MG/1; MG/1
1 TABLET, FILM COATED ORAL 2 TIMES DAILY
Qty: 14 TABLET | Refills: 0 | Status: SHIPPED | OUTPATIENT
Start: 2024-07-29 | End: 2024-08-05

## 2024-07-29 NOTE — PROGRESS NOTES
Chief Complaint   Patient presents with    Cough    Chest Congestion    Headache     Patient state his symptoms began on Saturday with cough which is productive with brownish mucus.        \"Have you been to the ER, urgent care clinic since your last visit?  Hospitalized since your last visit?\"    NO    “Have you seen or consulted any other health care providers outside of [facility] since your last visit?”    NO            Click Here for Release of Records Request

## 2024-07-29 NOTE — PROGRESS NOTES
David Strong (: 1950) is a 74 y.o. male, established patient, here for evaluation of the following chief complaint(s):  Cough, Chest Congestion, and Headache (Patient state his symptoms began on Saturday with cough which is productive with brownish mucus. )        Assessment & Plan  1. Cough.  Given the negative flu and COVID tests, the cough is likely due to a viral or bacterial infection. An antibiotic prescription will be sent to the pharmacy to be used if symptoms worsen over the next few days. He is advised to continue using Coricidin HBP, Mucinex, tea with honey, and salt water gargles. Hydration should be maintained with water, juices, or soups. Rest is also recommended.        Results  Laboratory Studies  Flu and Covid testing was negative.  1. Acute upper respiratory infection  -     amoxicillin-clavulanate (AUGMENTIN) 875-125 MG per tablet; Take 1 tablet by mouth 2 times daily for 7 days, Disp-14 tablet, R-0Normal  2. Cough, unspecified type  -     AMB POC COVID-19 COV  -     AMB POC INFLUENZA A  AND B REAL-TIME RT-PCR  3. Chest congestion  -     AMB POC COVID-19 COV  -     AMB POC INFLUENZA A  AND B REAL-TIME RT-PCR    Return if symptoms worsen or fail to improve.         Subjective   History of Present Illness  The patient presents for evaluation of a cough.    He began experiencing symptoms on Saturday, which started as a mild hacking cough during a  service. The cough has since worsened, producing brownish-yellow phlegm, but no blood. He has been self-medicating with Coricidin HBP, taken both during the day and at night. His fluid intake remains normal.    He also reports a sore throat, which he has been managing with hot salt water gargles, providing some relief.    He does not suffer from severe allergies, although he occasionally experiences sneezing and minor rashes.    His last blood work was conducted approximately 3 months ago, with no reported issues with his white blood

## 2024-08-09 ENCOUNTER — OFFICE VISIT (OUTPATIENT)
Age: 74
End: 2024-08-09

## 2024-08-09 VITALS
BODY MASS INDEX: 24.52 KG/M2 | HEART RATE: 61 BPM | DIASTOLIC BLOOD PRESSURE: 58 MMHG | OXYGEN SATURATION: 96 % | SYSTOLIC BLOOD PRESSURE: 143 MMHG | HEIGHT: 72 IN | WEIGHT: 181 LBS

## 2024-08-09 DIAGNOSIS — I48.0 PAROXYSMAL ATRIAL FIBRILLATION (HCC): ICD-10-CM

## 2024-08-09 DIAGNOSIS — I44.7 LBBB (LEFT BUNDLE BRANCH BLOCK): ICD-10-CM

## 2024-08-09 DIAGNOSIS — E78.00 PURE HYPERCHOLESTEROLEMIA: ICD-10-CM

## 2024-08-09 DIAGNOSIS — I10 ESSENTIAL (PRIMARY) HYPERTENSION: ICD-10-CM

## 2024-08-09 DIAGNOSIS — I35.1 NONRHEUMATIC AORTIC (VALVE) INSUFFICIENCY: ICD-10-CM

## 2024-08-09 DIAGNOSIS — Z79.01 CURRENT USE OF LONG TERM ANTICOAGULATION: ICD-10-CM

## 2024-08-09 DIAGNOSIS — I25.10 ATHEROSCLEROSIS OF NATIVE CORONARY ARTERY OF NATIVE HEART WITHOUT ANGINA PECTORIS: Primary | ICD-10-CM

## 2024-08-09 RX ORDER — AMLODIPINE BESYLATE 10 MG/1
10 TABLET ORAL DAILY
COMMUNITY
Start: 2024-07-17

## 2024-08-09 ASSESSMENT — PATIENT HEALTH QUESTIONNAIRE - PHQ9
DEPRESSION UNABLE TO ASSESS: FUNCTIONAL CAPACITY MOTIVATION LIMITS ACCURACY
SUM OF ALL RESPONSES TO PHQ QUESTIONS 1-9: 0
SUM OF ALL RESPONSES TO PHQ QUESTIONS 1-9: 0
2. FEELING DOWN, DEPRESSED OR HOPELESS: NOT AT ALL
1. LITTLE INTEREST OR PLEASURE IN DOING THINGS: NOT AT ALL
SUM OF ALL RESPONSES TO PHQ QUESTIONS 1-9: 0
SUM OF ALL RESPONSES TO PHQ9 QUESTIONS 1 & 2: 0
SUM OF ALL RESPONSES TO PHQ QUESTIONS 1-9: 0

## 2024-08-09 ASSESSMENT — ENCOUNTER SYMPTOMS
EYES NEGATIVE: 1
GASTROINTESTINAL NEGATIVE: 1
RESPIRATORY NEGATIVE: 1

## 2024-08-09 NOTE — PROGRESS NOTES
1. Have you been to the ER, urgent care clinic since your last visit?  Hospitalized since your last visit?     No      2.  Where do you normally have your labs drawn?   PCP    3. Do you need any refills today?   No    4. Which local pharmacy do you use (enter pharmacy)?   [facility]    5. Which mail order pharmacy do you use (enter pharmacy)?   [facility]    6. Are you here for surgical clearance and if so who will be doing your     procedure/surgery (care team)?   No      
ischemia  No ischemia on stress test  Normal LV function with mild to moderate aortic regurgitation.  No vegetation noted on the aortic valve.      Underwent recent cardiac cath - mild CAD with normal LVEF.  Seen for f/u- bp stable on current meds  Has moderate aortic regurgitation with hypertensive heart disease.  Recommend optimal blood pressure control.  Follow-up in 6 months with repeat echo to assess AI.  All questions answered.  11/2022  Stable cardiac status.  Continue monitoring valvular disease.  Mild to moderate aortic regurgitation on recent echo.  Normal ejection fraction right side remains in large  6/2023  Patient is here after recent admission for evaluation and admission for chest pressure, new onset rapid atrial fibrillation.  Had cardiac cath showing 80% disease in a small right posterolateral branch treated medically.  He had SUZE and cardioversion.  He is here for follow-up.  Feels better and symptoms are improving.  Currently on rate control and anticoagulation which we will continue.  2/2024  Cardiac status stable continue current medical management.  Continue anticoagulation    David was seen today for follow-up.    Diagnoses and all orders for this visit:    Atherosclerosis of native coronary artery of native heart without angina pectoris    Nonrheumatic aortic (valve) insufficiency    Paroxysmal atrial fibrillation (HCC)    Essential (primary) hypertension    Current use of long term anticoagulation    Pure hypercholesterolemia  -     Lipid Panel; Future  -     Hepatic Function Panel; Future    LBBB (left bundle branch block)          See patient instructions also for other medical advice given    Medications Discontinued During This Encounter   Medication Reason    aspirin 81 MG EC tablet Therapy completed       Follow-up and Dispositions    Return in about 6 months (around 2/9/2025), or if symptoms worsen or fail to improve, for post test/procedure.           Return to ER if any

## 2024-08-23 DIAGNOSIS — I48.0 PAROXYSMAL ATRIAL FIBRILLATION (HCC): ICD-10-CM

## 2024-08-27 RX ORDER — METOPROLOL TARTRATE 25 MG/1
25 TABLET, FILM COATED ORAL 2 TIMES DAILY
Qty: 200 TABLET | Refills: 3 | Status: SHIPPED | OUTPATIENT
Start: 2024-08-27

## 2024-09-05 DIAGNOSIS — I48.0 PAROXYSMAL ATRIAL FIBRILLATION (HCC): ICD-10-CM

## 2024-09-05 NOTE — TELEPHONE ENCOUNTER
Patient need....    Eliquis      90 day        [facility]   Person only have a week worth of medication.

## 2024-09-05 NOTE — TELEPHONE ENCOUNTER
Requested Prescriptions     Pending Prescriptions Disp Refills    apixaban (ELIQUIS) 5 MG TABS tablet 90 tablet 3     Sig: Take 1 tablet by mouth 2 times daily

## 2024-09-13 DIAGNOSIS — I48.0 PAROXYSMAL ATRIAL FIBRILLATION (HCC): ICD-10-CM

## 2024-09-16 DIAGNOSIS — I48.0 PAROXYSMAL ATRIAL FIBRILLATION (HCC): ICD-10-CM

## 2024-09-18 ENCOUNTER — TELEPHONE (OUTPATIENT)
Facility: CLINIC | Age: 74
End: 2024-09-18

## 2024-09-18 DIAGNOSIS — I48.0 PAROXYSMAL ATRIAL FIBRILLATION (HCC): ICD-10-CM

## 2024-10-22 ENCOUNTER — OFFICE VISIT (OUTPATIENT)
Facility: CLINIC | Age: 74
End: 2024-10-22
Payer: MEDICARE

## 2024-10-22 VITALS
SYSTOLIC BLOOD PRESSURE: 152 MMHG | WEIGHT: 183.25 LBS | OXYGEN SATURATION: 96 % | RESPIRATION RATE: 16 BRPM | HEART RATE: 80 BPM | DIASTOLIC BLOOD PRESSURE: 82 MMHG | BODY MASS INDEX: 24.85 KG/M2 | TEMPERATURE: 97.2 F

## 2024-10-22 DIAGNOSIS — I35.1 NONRHEUMATIC AORTIC VALVE INSUFFICIENCY: ICD-10-CM

## 2024-10-22 DIAGNOSIS — Z09 HOSPITAL DISCHARGE FOLLOW-UP: ICD-10-CM

## 2024-10-22 DIAGNOSIS — J18.9 PNEUMONIA DUE TO INFECTIOUS ORGANISM, UNSPECIFIED LATERALITY, UNSPECIFIED PART OF LUNG: Primary | ICD-10-CM

## 2024-10-22 DIAGNOSIS — I24.9 ACS (ACUTE CORONARY SYNDROME) (HCC): ICD-10-CM

## 2024-10-22 DIAGNOSIS — I48.0 PAROXYSMAL ATRIAL FIBRILLATION (HCC): ICD-10-CM

## 2024-10-22 PROCEDURE — 3077F SYST BP >= 140 MM HG: CPT | Performed by: STUDENT IN AN ORGANIZED HEALTH CARE EDUCATION/TRAINING PROGRAM

## 2024-10-22 PROCEDURE — 99214 OFFICE O/P EST MOD 30 MIN: CPT | Performed by: STUDENT IN AN ORGANIZED HEALTH CARE EDUCATION/TRAINING PROGRAM

## 2024-10-22 PROCEDURE — 1123F ACP DISCUSS/DSCN MKR DOCD: CPT | Performed by: STUDENT IN AN ORGANIZED HEALTH CARE EDUCATION/TRAINING PROGRAM

## 2024-10-22 PROCEDURE — G2211 COMPLEX E/M VISIT ADD ON: HCPCS | Performed by: STUDENT IN AN ORGANIZED HEALTH CARE EDUCATION/TRAINING PROGRAM

## 2024-10-22 PROCEDURE — 3079F DIAST BP 80-89 MM HG: CPT | Performed by: STUDENT IN AN ORGANIZED HEALTH CARE EDUCATION/TRAINING PROGRAM

## 2024-10-22 NOTE — PROGRESS NOTES
David Strong (: 1950) is a 74 y.o. male, established patient, here for evaluation of the following chief complaint(s):  Follow-Up from Hospital        Assessment & Plan  1. Pneumonia.  He was hospitalized due to chest pain and an abnormal EKG, which led to a cardiac catheterization. However, the primary issue was diagnosed as pneumonia. He completed a course of azithromycin on discharge and IV antibiotics during his hospital stay. He reports feeling fine since discharge.  No further treatment needed.    2. Atrial Fibrillation.  He was placed on heparin during hospitalization and resumed Eliquis. He reports no issues with atrial fibrillation during his hospital stay.  Unfortunately he had is in the donut hole currently so his Eliquis has been expensive.  Requesting 30-day supplies sent to pharmacy, this has been sent to [facility].   Aspirin was discussed for CAD prevention, but with his good cardiac cath and current use of Eliquis, the risks outweigh the benefits.      3. Moderate Aortic Insufficiency.  The echocardiogram showed normal heart function with moderate aortic insufficiency. This condition has been stable over the years and does not currently require intervention.       Results  Imaging  Echocardiogram showed normal ejection fraction, normal left and right ventricles, no aortic stenosis, and moderate aortic insufficiency.  1. Pneumonia due to infectious organism, unspecified laterality, unspecified part of lung  2. Paroxysmal atrial fibrillation (HCC)  -     apixaban (ELIQUIS) 5 MG TABS tablet; Take 1 tablet by mouth 2 times daily, Disp-60 tablet, R-3Normal  3. ACS (acute coronary syndrome) (HCC)  4. Nonrheumatic aortic valve insufficiency  5. Hospital discharge follow-up    Return for your previously scheduled next visit.         Subjective   History of Present Illness  The patient presents for a follow-up visit.    He experienced chest pain and an abnormal EKG, which led to a cardiac  19-Sep-2018 16:52

## 2024-10-22 NOTE — PROGRESS NOTES
\"Have you been to the ER, urgent care clinic since your last visit?  Hospitalized since your last visit?\"    YES - When: approximately 6 days ago.  Where and Why: [facility].    “Have you seen or consulted any other health care providers outside our system since your last visit?”    NO

## 2024-11-19 ENCOUNTER — OFFICE VISIT (OUTPATIENT)
Age: 74
End: 2024-11-19

## 2024-11-19 VITALS
OXYGEN SATURATION: 96 % | DIASTOLIC BLOOD PRESSURE: 65 MMHG | SYSTOLIC BLOOD PRESSURE: 159 MMHG | WEIGHT: 184 LBS | BODY MASS INDEX: 24.92 KG/M2 | HEART RATE: 59 BPM | HEIGHT: 72 IN | TEMPERATURE: 97.1 F

## 2024-11-19 DIAGNOSIS — I48.0 PAROXYSMAL ATRIAL FIBRILLATION (HCC): ICD-10-CM

## 2024-11-19 DIAGNOSIS — Z79.01 LONG TERM (CURRENT) USE OF ANTICOAGULANTS: ICD-10-CM

## 2024-11-19 DIAGNOSIS — I10 PRIMARY HYPERTENSION: Primary | ICD-10-CM

## 2024-11-19 DIAGNOSIS — R01.1 SYSTOLIC MURMUR: ICD-10-CM

## 2024-11-19 DIAGNOSIS — R94.31 ABNORMAL ECG: ICD-10-CM

## 2024-11-19 DIAGNOSIS — I20.89 ATYPICAL ANGINA (HCC): ICD-10-CM

## 2024-11-19 RX ORDER — PSEUDOEPHEDRINE HCL 30 MG
300 TABLET ORAL DAILY
COMMUNITY

## 2024-11-19 RX ORDER — AZITHROMYCIN 500 MG/1
TABLET, FILM COATED ORAL
COMMUNITY
Start: 2024-10-17 | End: 2024-11-19

## 2024-11-19 ASSESSMENT — PATIENT HEALTH QUESTIONNAIRE - PHQ9
SUM OF ALL RESPONSES TO PHQ QUESTIONS 1-9: 0
SUM OF ALL RESPONSES TO PHQ9 QUESTIONS 1 & 2: 0
SUM OF ALL RESPONSES TO PHQ QUESTIONS 1-9: 0
1. LITTLE INTEREST OR PLEASURE IN DOING THINGS: NOT AT ALL
SUM OF ALL RESPONSES TO PHQ QUESTIONS 1-9: 0
SUM OF ALL RESPONSES TO PHQ QUESTIONS 1-9: 0
2. FEELING DOWN, DEPRESSED OR HOPELESS: NOT AT ALL

## 2024-11-19 NOTE — PROGRESS NOTES
1. \"Have you been to the ER, urgent care clinic since your last visit?  Hospitalized since your last visit?\" Reviewed by Dr. Thomas Connolly    2. \"Have you seen or consulted any other health care providers outside of the [facility] since your last visit?\" Reviewed by Dr. Thomas Connolly  
AI, including the recording.       An electronic signature was used to authenticate this note.    --Thomas Connolly MD

## 2024-12-10 DIAGNOSIS — G62.9 POLYNEUROPATHY, UNSPECIFIED: ICD-10-CM

## 2024-12-10 RX ORDER — GABAPENTIN 400 MG/1
400 CAPSULE ORAL 3 TIMES DAILY
Qty: 270 CAPSULE | Refills: 3 | Status: SHIPPED | OUTPATIENT
Start: 2024-12-10 | End: 2024-12-10

## 2024-12-10 RX ORDER — GABAPENTIN 400 MG/1
400 CAPSULE ORAL 3 TIMES DAILY
Qty: 270 CAPSULE | Refills: 3 | Status: SHIPPED | OUTPATIENT
Start: 2024-12-10 | End: 2024-12-17 | Stop reason: SDUPTHER

## 2024-12-17 ENCOUNTER — OFFICE VISIT (OUTPATIENT)
Facility: CLINIC | Age: 74
End: 2024-12-17

## 2024-12-17 VITALS
RESPIRATION RATE: 16 BRPM | SYSTOLIC BLOOD PRESSURE: 166 MMHG | WEIGHT: 182 LBS | OXYGEN SATURATION: 96 % | HEART RATE: 60 BPM | DIASTOLIC BLOOD PRESSURE: 66 MMHG | BODY MASS INDEX: 24.68 KG/M2

## 2024-12-17 DIAGNOSIS — I10 ESSENTIAL HYPERTENSION: ICD-10-CM

## 2024-12-17 DIAGNOSIS — J18.9 PNEUMONIA DUE TO INFECTIOUS ORGANISM, UNSPECIFIED LATERALITY, UNSPECIFIED PART OF LUNG: Primary | ICD-10-CM

## 2024-12-17 DIAGNOSIS — G62.9 POLYNEUROPATHY, UNSPECIFIED: ICD-10-CM

## 2024-12-17 RX ORDER — AMLODIPINE BESYLATE 10 MG/1
10 TABLET ORAL DAILY
Qty: 90 TABLET | Refills: 0 | Status: SHIPPED | OUTPATIENT
Start: 2024-12-17

## 2024-12-17 RX ORDER — GABAPENTIN 400 MG/1
400 CAPSULE ORAL 3 TIMES DAILY
Qty: 270 CAPSULE | Refills: 0 | Status: SHIPPED | OUTPATIENT
Start: 2024-12-17 | End: 2025-12-12

## 2024-12-17 RX ORDER — BENAZEPRIL HYDROCHLORIDE 40 MG/1
40 TABLET ORAL DAILY
Qty: 90 TABLET | Refills: 0 | Status: SHIPPED | OUTPATIENT
Start: 2024-12-17

## 2024-12-17 NOTE — PROGRESS NOTES
David Strong (: 1950) is a 74 y.o. male, established patient, here for evaluation of the following chief complaint(s):  Back Pain (Middle of the back. Patient states when he had pnuemonia it is the same feeling he had then. Patient states he has been having some SOB.)        Assessment & Plan  1. Chest congestion - Reports chest congestion, increased sweating, and occasional shortness of breath, similar to previous pneumonia. Inspiratory wheezing noted, no crackles.  - Order chest x-ray to rule out return of pneumonia and ensure resolution of prior infection.    - Defer antibiotics until x-ray results.  Supportive care today    2. Elevated blood pressure - Blood pressure 166/70. On benazepril and amlodipine.  - Advise home blood pressure monitoring  - Ensure adequate hydration  - Send prescriptions for benazepril and amlodipine to [facility] for 90-day supply  - Consider medication adjustments if blood pressure remains elevated    3.  Polyneuropathy- Gabapentin prescription management.  - Send gabapentin prescription to [facility] for 90-day supply    PROCEDURE  Procedure Performed  Comprehensive cardiac evaluation, including catheterization.    Results    1. Pneumonia due to infectious organism, unspecified laterality, unspecified part of lung  -     XR CHEST (2 VIEWS); Future  2. Polyneuropathy, unspecified  -     gabapentin (NEURONTIN) 400 MG capsule; Take 1 capsule by mouth 3 times daily for 360 days. Max Daily Amount: 1,200 mg, Disp-270 capsule, R-0Normal  3. Essential hypertension  -     benazepril (LOTENSIN) 40 MG tablet; Take 1 tablet by mouth daily, Disp-90 tablet, R-0Requesting 1 year supplyNormal  -     amLODIPine (NORVASC) 10 MG tablet; Take 1 tablet by mouth daily, Disp-90 tablet, R-0Normal    Return for your previously scheduled next visit.         Subjective   History of Present Illness  The patient presents with chest congestion and elevated blood pressure.    Previously hospitalized for

## 2024-12-17 NOTE — PATIENT INSTRUCTIONS
- Please check your blood pressure once a day over the next 2 weeks and write it down.   - Ideally check your blood pressure at different times of the day.   - In 2 weeks send me a [employer] message with those blood pressure readings.

## 2024-12-20 ENCOUNTER — TELEPHONE (OUTPATIENT)
Facility: CLINIC | Age: 74
End: 2024-12-20

## 2024-12-20 NOTE — TELEPHONE ENCOUNTER
Patient called requesting a call back from Dr. Guerra regarding his Lab results.Please review and advise. Patient can be reached at : [phone].

## 2025-01-23 ENCOUNTER — OFFICE VISIT (OUTPATIENT)
Facility: CLINIC | Age: 75
End: 2025-01-23
Payer: MEDICARE

## 2025-01-23 VITALS
DIASTOLIC BLOOD PRESSURE: 60 MMHG | SYSTOLIC BLOOD PRESSURE: 160 MMHG | TEMPERATURE: 96.9 F | BODY MASS INDEX: 25.02 KG/M2 | HEART RATE: 60 BPM | WEIGHT: 184.5 LBS | RESPIRATION RATE: 14 BRPM | OXYGEN SATURATION: 95 %

## 2025-01-23 DIAGNOSIS — N40.1 BENIGN PROSTATIC HYPERPLASIA WITH INCOMPLETE BLADDER EMPTYING: Primary | ICD-10-CM

## 2025-01-23 DIAGNOSIS — I10 ESSENTIAL HYPERTENSION: ICD-10-CM

## 2025-01-23 DIAGNOSIS — R39.14 BENIGN PROSTATIC HYPERPLASIA WITH INCOMPLETE BLADDER EMPTYING: Primary | ICD-10-CM

## 2025-01-23 DIAGNOSIS — C83.00 DIFFUSE WELL-DIFFERENTIATED LYMPHOCYTIC LYMPHOMA (HCC): ICD-10-CM

## 2025-01-23 PROCEDURE — 3077F SYST BP >= 140 MM HG: CPT | Performed by: STUDENT IN AN ORGANIZED HEALTH CARE EDUCATION/TRAINING PROGRAM

## 2025-01-23 PROCEDURE — G2211 COMPLEX E/M VISIT ADD ON: HCPCS | Performed by: STUDENT IN AN ORGANIZED HEALTH CARE EDUCATION/TRAINING PROGRAM

## 2025-01-23 PROCEDURE — 3078F DIAST BP <80 MM HG: CPT | Performed by: STUDENT IN AN ORGANIZED HEALTH CARE EDUCATION/TRAINING PROGRAM

## 2025-01-23 PROCEDURE — 99214 OFFICE O/P EST MOD 30 MIN: CPT | Performed by: STUDENT IN AN ORGANIZED HEALTH CARE EDUCATION/TRAINING PROGRAM

## 2025-01-23 PROCEDURE — 1123F ACP DISCUSS/DSCN MKR DOCD: CPT | Performed by: STUDENT IN AN ORGANIZED HEALTH CARE EDUCATION/TRAINING PROGRAM

## 2025-01-23 SDOH — ECONOMIC STABILITY: FOOD INSECURITY: WITHIN THE PAST 12 MONTHS, YOU WORRIED THAT YOUR FOOD WOULD RUN OUT BEFORE YOU GOT MONEY TO BUY MORE.: NEVER TRUE

## 2025-01-23 SDOH — ECONOMIC STABILITY: FOOD INSECURITY: WITHIN THE PAST 12 MONTHS, THE FOOD YOU BOUGHT JUST DIDN'T LAST AND YOU DIDN'T HAVE MONEY TO GET MORE.: NEVER TRUE

## 2025-01-23 ASSESSMENT — PATIENT HEALTH QUESTIONNAIRE - PHQ9
SUM OF ALL RESPONSES TO PHQ QUESTIONS 1-9: 0
1. LITTLE INTEREST OR PLEASURE IN DOING THINGS: NOT AT ALL
2. FEELING DOWN, DEPRESSED OR HOPELESS: NOT AT ALL
SUM OF ALL RESPONSES TO PHQ9 QUESTIONS 1 & 2: 0
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
## 2025-01-28 ENCOUNTER — OFFICE VISIT (OUTPATIENT)
Facility: CLINIC | Age: 75
End: 2025-01-28

## 2025-01-28 VITALS
TEMPERATURE: 99.1 F | BODY MASS INDEX: 24.95 KG/M2 | DIASTOLIC BLOOD PRESSURE: 76 MMHG | WEIGHT: 184 LBS | SYSTOLIC BLOOD PRESSURE: 174 MMHG | HEART RATE: 66 BPM | OXYGEN SATURATION: 96 % | RESPIRATION RATE: 16 BRPM

## 2025-01-28 DIAGNOSIS — R68.89 FLU-LIKE SYMPTOMS: ICD-10-CM

## 2025-01-28 DIAGNOSIS — J06.9 UPPER RESPIRATORY TRACT INFECTION, UNSPECIFIED TYPE: Primary | ICD-10-CM

## 2025-01-28 NOTE — PROGRESS NOTES
\"Have you been to the ER, urgent care clinic since your last visit?  Hospitalized since your last visit?\"    NO    “Have you seen or consulted any other health care providers outside our system since your last visit?”    NO              room air

## 2025-01-28 NOTE — PROGRESS NOTES
David Strong (: 1950) is a 75 y.o. male, established patient, here for evaluation of the following chief complaint(s):  fli like symptoms (Coughing up phlem. Symptoms started yesterday.)        Assessment & Plan  1. Upper respiratory infection: Acute.  - Prescription for Augmentin 875 mg issued.  - Advised to take Mucinex DM for cough suppression, safe for high blood pressure.  - Encouraged to consume tea with honey and maintain adequate hydration.  - Rest recommended.  - Monitor for worsening shortness of breath or uncontrollable fevers, which could indicate pneumonia.    Follow-up  - Monitor for worsening symptoms such as shortness of breath or uncontrollable fevers.    Results    1. Upper respiratory tract infection, unspecified type  -     amoxicillin-clavulanate (AUGMENTIN) 875-125 MG per tablet; Take 1 tablet by mouth 2 times daily for 7 days, Disp-14 tablet, R-0Normal  2. Flu-like symptoms  -     AMB POC INFLUENZA A  AND B REAL-TIME RT-PCR  -     AMB POC COVID-19 COV    Return if symptoms worsen or fail to improve.         Subjective   History of Present Illness  The patient presents for evaluation of an upper respiratory infection.    He reports a persistent cough that began yesterday afternoon while at rest. The cough, initially non-productive, is now productive with yellowish sputum. His throat is irritated due to frequent coughing but without pain. He reports mild fatigue, no shortness of breath, and a clicking sound in his ear yesterday without pain or pressure. No difficulty swallowing. He has been drinking soda, tea, and water but not as much as recommended. No known drug allergies. He took Coricidin HBP, two doses last night and one this morning, with some improvement.    ALLERGIES  - No known allergies    MEDICATIONS  - Coricidin HBP (current)           Objective   Blood pressure (!) 174/76, pulse 66, temperature 99.1 °F (37.3 °C), temperature source Tympanic, resp. rate 16, weight 83.5

## 2025-03-13 DIAGNOSIS — I48.0 PAROXYSMAL ATRIAL FIBRILLATION (HCC): ICD-10-CM

## 2025-03-13 DIAGNOSIS — E78.5 DYSLIPIDEMIA: ICD-10-CM

## 2025-03-13 DIAGNOSIS — I10 ESSENTIAL HYPERTENSION: ICD-10-CM

## 2025-03-13 NOTE — TELEPHONE ENCOUNTER
Uroxatral is managed by Urology please have patient contact Urology for refills. Lotensin, Lopressor and Lipitor pended. Pharmacy has been updated please review.

## 2025-03-13 NOTE — TELEPHONE ENCOUNTER
This patient contacted office for the following prescriptions to be filled:    Medication requested :   benazepril (LOTENSIN) 40 MG tablet      metoprolol tartrate (LOPRESSOR) 25 MG tablet     atorvastatin (LIPITOR) 40 MG tablet     alfuzosin (UROXATRAL) 10 MG extended release tablet     PCP: Charlie   Pharmacy or Print: Pharmacy   Mail order or Local pharmacy [facility]    Scheduled appointment if not seen by current providers in office: LOV: 25 [id number]

## 2025-03-14 RX ORDER — BENAZEPRIL HYDROCHLORIDE 40 MG/1
40 TABLET ORAL DAILY
Qty: 90 TABLET | Refills: 3 | Status: SHIPPED | OUTPATIENT
Start: 2025-03-14

## 2025-03-14 RX ORDER — METOPROLOL TARTRATE 25 MG/1
25 TABLET, FILM COATED ORAL 2 TIMES DAILY
Qty: 180 TABLET | Refills: 3 | Status: SHIPPED | OUTPATIENT
Start: 2025-03-14

## 2025-03-14 RX ORDER — ATORVASTATIN CALCIUM 40 MG/1
40 TABLET, FILM COATED ORAL DAILY
Qty: 90 TABLET | Refills: 3 | Status: SHIPPED | OUTPATIENT
Start: 2025-03-14

## 2025-04-11 DIAGNOSIS — I10 ESSENTIAL HYPERTENSION: ICD-10-CM

## 2025-04-11 RX ORDER — AMLODIPINE BESYLATE 10 MG/1
10 TABLET ORAL DAILY
Qty: 90 TABLET | Refills: 3 | Status: SHIPPED | OUTPATIENT
Start: 2025-04-11

## 2025-04-11 NOTE — TELEPHONE ENCOUNTER
This patient contacted office for the following prescriptions to be filled:    Medication requested :   amLODIPine (NORVASC) 10 MG tablet   PCP: Charlie  Pharmacy or Print: Pharmacy   Mail order or Local pharmacy Mail Order ([facility] - [place], [place] - [address] [place] [address]     Scheduled appointment if not seen by current providers in office: LOV: 25 [id number]      Pt would like 90 day supply sent to [facility]Please review and advise as needed.

## 2025-04-21 DIAGNOSIS — I48.0 PAROXYSMAL ATRIAL FIBRILLATION (HCC): ICD-10-CM

## 2025-04-21 NOTE — TELEPHONE ENCOUNTER
This patient contacted office for the following prescriptions to be filled:REQUESTING 60 DAY SUPPLY    Medication requested :   ELIQUIS 5 MG TABS tablet   PCP: Dr. Guerra  Pharmacy or Print: [facility]   Mail order or Local pharmacy : [phone]     Scheduled appointment if not seen by current providers in office: LOV 01/28/25 upcoming 07/24/25.

## 2025-07-24 ENCOUNTER — OFFICE VISIT (OUTPATIENT)
Facility: CLINIC | Age: 75
End: 2025-07-24
Payer: MEDICARE

## 2025-07-24 VITALS
DIASTOLIC BLOOD PRESSURE: 64 MMHG | TEMPERATURE: 95.6 F | SYSTOLIC BLOOD PRESSURE: 140 MMHG | HEIGHT: 72 IN | RESPIRATION RATE: 16 BRPM | HEART RATE: 53 BPM | OXYGEN SATURATION: 96 % | WEIGHT: 184.25 LBS | BODY MASS INDEX: 24.95 KG/M2

## 2025-07-24 DIAGNOSIS — I10 ESSENTIAL HYPERTENSION: ICD-10-CM

## 2025-07-24 DIAGNOSIS — R29.898 WEAKNESS OF LOW BACK DUE TO INACTIVITY: ICD-10-CM

## 2025-07-24 DIAGNOSIS — R39.14 BENIGN PROSTATIC HYPERPLASIA WITH INCOMPLETE BLADDER EMPTYING: ICD-10-CM

## 2025-07-24 DIAGNOSIS — N40.1 BENIGN PROSTATIC HYPERPLASIA WITH INCOMPLETE BLADDER EMPTYING: ICD-10-CM

## 2025-07-24 DIAGNOSIS — M54.50 CHRONIC MIDLINE LOW BACK PAIN WITHOUT SCIATICA: ICD-10-CM

## 2025-07-24 DIAGNOSIS — Z00.00 MEDICARE ANNUAL WELLNESS VISIT, SUBSEQUENT: Primary | ICD-10-CM

## 2025-07-24 DIAGNOSIS — G89.29 CHRONIC MIDLINE LOW BACK PAIN WITHOUT SCIATICA: ICD-10-CM

## 2025-07-24 DIAGNOSIS — E78.5 DYSLIPIDEMIA: ICD-10-CM

## 2025-07-24 DIAGNOSIS — E55.9 VITAMIN D DEFICIENCY: ICD-10-CM

## 2025-07-24 DIAGNOSIS — C83.00 DIFFUSE WELL-DIFFERENTIATED LYMPHOCYTIC LYMPHOMA (HCC): ICD-10-CM

## 2025-07-24 DIAGNOSIS — I48.0 PAROXYSMAL ATRIAL FIBRILLATION (HCC): ICD-10-CM

## 2025-07-24 PROCEDURE — 1123F ACP DISCUSS/DSCN MKR DOCD: CPT | Performed by: STUDENT IN AN ORGANIZED HEALTH CARE EDUCATION/TRAINING PROGRAM

## 2025-07-24 PROCEDURE — 3078F DIAST BP <80 MM HG: CPT | Performed by: STUDENT IN AN ORGANIZED HEALTH CARE EDUCATION/TRAINING PROGRAM

## 2025-07-24 PROCEDURE — G2211 COMPLEX E/M VISIT ADD ON: HCPCS | Performed by: STUDENT IN AN ORGANIZED HEALTH CARE EDUCATION/TRAINING PROGRAM

## 2025-07-24 PROCEDURE — 3077F SYST BP >= 140 MM HG: CPT | Performed by: STUDENT IN AN ORGANIZED HEALTH CARE EDUCATION/TRAINING PROGRAM

## 2025-07-24 PROCEDURE — G0439 PPPS, SUBSEQ VISIT: HCPCS | Performed by: STUDENT IN AN ORGANIZED HEALTH CARE EDUCATION/TRAINING PROGRAM

## 2025-07-24 PROCEDURE — 99214 OFFICE O/P EST MOD 30 MIN: CPT | Performed by: STUDENT IN AN ORGANIZED HEALTH CARE EDUCATION/TRAINING PROGRAM

## 2025-07-24 ASSESSMENT — PATIENT HEALTH QUESTIONNAIRE - PHQ9
SUM OF ALL RESPONSES TO PHQ QUESTIONS 1-9: 0
2. FEELING DOWN, DEPRESSED OR HOPELESS: NOT AT ALL
SUM OF ALL RESPONSES TO PHQ QUESTIONS 1-9: 0
1. LITTLE INTEREST OR PLEASURE IN DOING THINGS: NOT AT ALL
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
## 2025-07-25 LAB
A/G RATIO: 2.1 RATIO (ref 1.1–2.6)
ALBUMIN: 4.7 G/DL (ref 3.5–5)
ALP BLD-CCNC: 69 U/L (ref 40–125)
ALT SERPL-CCNC: 10 U/L (ref 5–40)
ANION GAP SERPL CALCULATED.3IONS-SCNC: 11 MMOL/L (ref 3–15)
AST SERPL-CCNC: 24 U/L (ref 10–37)
BILIRUB SERPL-MCNC: 0.6 MG/DL (ref 0.2–1.2)
BUN BLDV-MCNC: 17 MG/DL (ref 6–22)
CALCIUM SERPL-MCNC: 9.8 MG/DL (ref 8.4–10.5)
CHLORIDE BLD-SCNC: 104 MMOL/L (ref 98–110)
CHOLESTEROL, TOTAL: 134 MG/DL (ref 110–200)
CHOLESTEROL/HDL RATIO: 2.4 (ref 0–5)
CO2: 27 MMOL/L (ref 20–32)
CREAT SERPL-MCNC: 1.1 MG/DL (ref 0.8–1.6)
GFR, ESTIMATED: 66.5 ML/MIN/1.73 SQ.M.
GLOBULIN: 2.2 G/DL (ref 2–4)
GLUCOSE: 98 MG/DL (ref 70–99)
HCT VFR BLD CALC: 43.9 % (ref 37.8–52.2)
HDLC SERPL-MCNC: 56 MG/DL
HEMOGLOBIN: 13.8 G/DL (ref 12.6–17.1)
LDL CHOLESTEROL: 70 MG/DL (ref 50–99)
LDL/HDL RATIO: 1.3
MCH RBC QN AUTO: 28 PG (ref 26–34)
MCHC RBC AUTO-ENTMCNC: 31 G/DL (ref 31–36)
MCV RBC AUTO: 89 FL (ref 80–95)
NON-HDL CHOLESTEROL: 78 MG/DL
PDW BLD-RTO: 12.2 % (ref 10–15.5)
PLATELET # BLD: 168 K/UL (ref 140–440)
PMV BLD AUTO: 11.4 FL (ref 9–13)
POTASSIUM SERPL-SCNC: 4.5 MMOL/L (ref 3.5–5.5)
RBC # BLD: 4.96 M/UL (ref 3.8–5.8)
SODIUM BLD-SCNC: 142 MMOL/L (ref 133–145)
TOTAL PROTEIN: 6.9 G/DL (ref 6.2–8.1)
TRIGL SERPL-MCNC: 38 MG/DL (ref 40–149)
VITAMIN D 25-HYDROXY: 69.4 NG/ML (ref 32–100)
VLDLC SERPL CALC-MCNC: 8 MG/DL (ref 8–30)
WBC # BLD: 8 K/UL (ref 4–11)

## 2025-07-26 LAB
ESTIMATED AVERAGE GLUCOSE: 131 MG/DL (ref 91–123)
HBA1C MFR BLD: 6.2 % (ref 4.8–5.6)

## 2025-08-04 ENCOUNTER — OFFICE VISIT (OUTPATIENT)
Facility: CLINIC | Age: 75
End: 2025-08-04
Payer: MEDICARE

## 2025-08-04 VITALS
HEART RATE: 59 BPM | SYSTOLIC BLOOD PRESSURE: 146 MMHG | DIASTOLIC BLOOD PRESSURE: 60 MMHG | OXYGEN SATURATION: 95 % | WEIGHT: 184.5 LBS | HEIGHT: 72 IN | RESPIRATION RATE: 16 BRPM | BODY MASS INDEX: 24.99 KG/M2 | TEMPERATURE: 97.8 F

## 2025-08-04 DIAGNOSIS — R39.14 BENIGN PROSTATIC HYPERPLASIA WITH INCOMPLETE BLADDER EMPTYING: Primary | ICD-10-CM

## 2025-08-04 DIAGNOSIS — N40.1 BENIGN PROSTATIC HYPERPLASIA WITH INCOMPLETE BLADDER EMPTYING: Primary | ICD-10-CM

## 2025-08-04 PROCEDURE — 3077F SYST BP >= 140 MM HG: CPT | Performed by: STUDENT IN AN ORGANIZED HEALTH CARE EDUCATION/TRAINING PROGRAM

## 2025-08-04 PROCEDURE — 99213 OFFICE O/P EST LOW 20 MIN: CPT | Performed by: STUDENT IN AN ORGANIZED HEALTH CARE EDUCATION/TRAINING PROGRAM

## 2025-08-04 PROCEDURE — 3078F DIAST BP <80 MM HG: CPT | Performed by: STUDENT IN AN ORGANIZED HEALTH CARE EDUCATION/TRAINING PROGRAM

## 2025-08-04 PROCEDURE — G2211 COMPLEX E/M VISIT ADD ON: HCPCS | Performed by: STUDENT IN AN ORGANIZED HEALTH CARE EDUCATION/TRAINING PROGRAM

## 2025-08-04 PROCEDURE — 1123F ACP DISCUSS/DSCN MKR DOCD: CPT | Performed by: STUDENT IN AN ORGANIZED HEALTH CARE EDUCATION/TRAINING PROGRAM

## 2025-08-11 ENCOUNTER — OFFICE VISIT (OUTPATIENT)
Age: 75
End: 2025-08-11
Payer: MEDICARE

## 2025-08-11 VITALS
BODY MASS INDEX: 24.79 KG/M2 | HEART RATE: 60 BPM | SYSTOLIC BLOOD PRESSURE: 138 MMHG | DIASTOLIC BLOOD PRESSURE: 76 MMHG | HEIGHT: 72 IN | OXYGEN SATURATION: 97 % | WEIGHT: 183 LBS

## 2025-08-11 DIAGNOSIS — I25.118 CORONARY ARTERY DISEASE OF NATIVE ARTERY OF NATIVE HEART WITH STABLE ANGINA PECTORIS: ICD-10-CM

## 2025-08-11 DIAGNOSIS — R94.31 ABNORMAL EKG: ICD-10-CM

## 2025-08-11 DIAGNOSIS — I48.0 PAROXYSMAL ATRIAL FIBRILLATION (HCC): ICD-10-CM

## 2025-08-11 DIAGNOSIS — I05.9 MITRAL VALVE DISEASE: ICD-10-CM

## 2025-08-11 DIAGNOSIS — I35.9 AORTIC VALVE DISEASE: ICD-10-CM

## 2025-08-11 DIAGNOSIS — I10 ESSENTIAL HYPERTENSION: ICD-10-CM

## 2025-08-11 DIAGNOSIS — Z01.818 PREOPERATIVE CLEARANCE: Primary | ICD-10-CM

## 2025-08-11 DIAGNOSIS — Z79.01 CURRENT USE OF LONG TERM ANTICOAGULATION: ICD-10-CM

## 2025-08-11 DIAGNOSIS — I44.7 LEFT BUNDLE BRANCH BLOCK: ICD-10-CM

## 2025-08-11 PROCEDURE — 3075F SYST BP GE 130 - 139MM HG: CPT

## 2025-08-11 PROCEDURE — 93000 ELECTROCARDIOGRAM COMPLETE: CPT

## 2025-08-11 PROCEDURE — 99214 OFFICE O/P EST MOD 30 MIN: CPT

## 2025-08-11 PROCEDURE — 3078F DIAST BP <80 MM HG: CPT

## 2025-08-11 PROCEDURE — 1123F ACP DISCUSS/DSCN MKR DOCD: CPT

## 2025-08-11 ASSESSMENT — PATIENT HEALTH QUESTIONNAIRE - PHQ9
2. FEELING DOWN, DEPRESSED OR HOPELESS: NOT AT ALL
SUM OF ALL RESPONSES TO PHQ QUESTIONS 1-9: 0
1. LITTLE INTEREST OR PLEASURE IN DOING THINGS: NOT AT ALL

## 2025-08-11 ASSESSMENT — ENCOUNTER SYMPTOMS
WHEEZING: 0
COUGH: 0
VOMITING: 0
RHINORRHEA: 0
ABDOMINAL PAIN: 0
DIARRHEA: 0
SHORTNESS OF BREATH: 0
SORE THROAT: 0
NAUSEA: 0

## 2025-08-15 ENCOUNTER — HOSPITAL ENCOUNTER (OUTPATIENT)
Facility: HOSPITAL | Age: 75
Setting detail: RECURRING SERIES
Discharge: HOME | End: 2025-08-18
Payer: MEDICARE

## 2025-08-15 ENCOUNTER — TELEPHONE (OUTPATIENT)
Facility: HOSPITAL | Age: 75
End: 2025-08-15

## 2025-08-15 PROCEDURE — 97161 PT EVAL LOW COMPLEX 20 MIN: CPT

## 2025-08-18 ENCOUNTER — TELEPHONE (OUTPATIENT)
Facility: HOSPITAL | Age: 75
End: 2025-08-18

## 2025-08-20 ENCOUNTER — APPOINTMENT (OUTPATIENT)
Facility: HOSPITAL | Age: 75
End: 2025-08-20
Payer: MEDICARE

## 2025-08-22 ENCOUNTER — APPOINTMENT (OUTPATIENT)
Facility: HOSPITAL | Age: 75
End: 2025-08-22
Payer: MEDICARE

## 2025-08-25 ENCOUNTER — APPOINTMENT (OUTPATIENT)
Facility: HOSPITAL | Age: 75
End: 2025-08-25
Payer: MEDICARE

## (undated) DEVICE — PRESSURE MONITORING SET: Brand: TRUWAVE

## (undated) DEVICE — BAND RADIAL COMPR ARTERY 24CM -- REG BX/10

## (undated) DEVICE — PACK PROCEDURE SURG VASC CATH 161 MMC LF

## (undated) DEVICE — RADIFOCUS OPTITORQUE ANGIOGRAPHIC CATHETER: Brand: OPTITORQUE

## (undated) DEVICE — SET FLD ADMIN 3 W STPCOCK FIX FEM L BOR 1IN

## (undated) DEVICE — PROCEDURE KIT FLUID MGMT 10 FR CUST MAINFOLD

## (undated) DEVICE — GLIDESHEATH SLENDER STAINLESS STEEL KIT: Brand: GLIDESHEATH SLENDER